# Patient Record
Sex: FEMALE | Race: WHITE | NOT HISPANIC OR LATINO | Employment: UNEMPLOYED | URBAN - METROPOLITAN AREA
[De-identification: names, ages, dates, MRNs, and addresses within clinical notes are randomized per-mention and may not be internally consistent; named-entity substitution may affect disease eponyms.]

---

## 2021-06-04 ENCOUNTER — OFFICE VISIT (OUTPATIENT)
Dept: FAMILY MEDICINE | Facility: CLINIC | Age: 61
End: 2021-06-04
Payer: COMMERCIAL

## 2021-06-04 ENCOUNTER — TELEPHONE (OUTPATIENT)
Dept: RADIOLOGY | Facility: HOSPITAL | Age: 61
End: 2021-06-04

## 2021-06-04 ENCOUNTER — HOSPITAL ENCOUNTER (OUTPATIENT)
Dept: RADIOLOGY | Facility: HOSPITAL | Age: 61
Discharge: HOME | End: 2021-06-04
Attending: NURSE PRACTITIONER
Payer: COMMERCIAL

## 2021-06-04 VITALS
WEIGHT: 170.88 LBS | DIASTOLIC BLOOD PRESSURE: 84 MMHG | HEART RATE: 67 BPM | BODY MASS INDEX: 29.17 KG/M2 | SYSTOLIC BLOOD PRESSURE: 139 MMHG | TEMPERATURE: 98 F | HEIGHT: 64 IN

## 2021-06-04 DIAGNOSIS — M25.512 CHRONIC LEFT SHOULDER PAIN: Primary | ICD-10-CM

## 2021-06-04 DIAGNOSIS — M25.612 DECREASED ROM OF LEFT SHOULDER: ICD-10-CM

## 2021-06-04 DIAGNOSIS — G89.29 CHRONIC LEFT SHOULDER PAIN: Primary | ICD-10-CM

## 2021-06-04 DIAGNOSIS — R22.32 ARM MASS, LEFT: ICD-10-CM

## 2021-06-04 DIAGNOSIS — M25.512 CHRONIC LEFT SHOULDER PAIN: ICD-10-CM

## 2021-06-04 DIAGNOSIS — G89.29 CHRONIC LEFT SHOULDER PAIN: ICD-10-CM

## 2021-06-04 PROCEDURE — 99999 PR PBB SHADOW E&M-NEW PATIENT-LVL III: ICD-10-PCS | Mod: PBBFAC,,, | Performed by: NURSE PRACTITIONER

## 2021-06-04 PROCEDURE — 99203 OFFICE O/P NEW LOW 30 MIN: CPT | Mod: S$GLB,,, | Performed by: NURSE PRACTITIONER

## 2021-06-04 PROCEDURE — 3008F PR BODY MASS INDEX (BMI) DOCUMENTED: ICD-10-PCS | Mod: CPTII,S$GLB,, | Performed by: NURSE PRACTITIONER

## 2021-06-04 PROCEDURE — 73030 X-RAY EXAM OF SHOULDER: CPT | Mod: TC,PO,LT

## 2021-06-04 PROCEDURE — 99203 PR OFFICE/OUTPT VISIT, NEW, LEVL III, 30-44 MIN: ICD-10-PCS | Mod: S$GLB,,, | Performed by: NURSE PRACTITIONER

## 2021-06-04 PROCEDURE — 73030 XR SHOULDER COMPLETE 2 OR MORE VIEWS LEFT: ICD-10-PCS | Mod: 26,LT,, | Performed by: RADIOLOGY

## 2021-06-04 PROCEDURE — 1125F PR PAIN SEVERITY QUANTIFIED, PAIN PRESENT: ICD-10-PCS | Mod: S$GLB,,, | Performed by: NURSE PRACTITIONER

## 2021-06-04 PROCEDURE — 99999 PR PBB SHADOW E&M-NEW PATIENT-LVL III: CPT | Mod: PBBFAC,,, | Performed by: NURSE PRACTITIONER

## 2021-06-04 PROCEDURE — 73030 X-RAY EXAM OF SHOULDER: CPT | Mod: 26,LT,, | Performed by: RADIOLOGY

## 2021-06-04 PROCEDURE — 1125F AMNT PAIN NOTED PAIN PRSNT: CPT | Mod: S$GLB,,, | Performed by: NURSE PRACTITIONER

## 2021-06-04 PROCEDURE — 3008F BODY MASS INDEX DOCD: CPT | Mod: CPTII,S$GLB,, | Performed by: NURSE PRACTITIONER

## 2021-06-07 ENCOUNTER — HOSPITAL ENCOUNTER (OUTPATIENT)
Dept: RADIOLOGY | Facility: HOSPITAL | Age: 61
Discharge: HOME | End: 2021-06-07
Attending: NURSE PRACTITIONER
Payer: COMMERCIAL

## 2021-06-07 DIAGNOSIS — R22.32 ARM MASS, LEFT: ICD-10-CM

## 2021-06-07 PROCEDURE — 76882 US LMTD JT/FCL EVL NVASC XTR: CPT | Mod: TC,PO,LT

## 2021-06-07 PROCEDURE — 76882 US LMTD JT/FCL EVL NVASC XTR: CPT | Mod: 26,LT,, | Performed by: RADIOLOGY

## 2021-06-07 PROCEDURE — 76882 US SOFT TISSUE, UPPER EXTREMITY, LEFT: ICD-10-PCS | Mod: 26,LT,, | Performed by: RADIOLOGY

## 2023-09-05 ENCOUNTER — OFFICE VISIT (OUTPATIENT)
Dept: FAMILY MEDICINE | Facility: CLINIC | Age: 63
End: 2023-09-05
Payer: COMMERCIAL

## 2023-09-05 ENCOUNTER — LAB VISIT (OUTPATIENT)
Dept: LAB | Facility: HOSPITAL | Age: 63
End: 2023-09-05
Attending: PHYSICIAN ASSISTANT
Payer: COMMERCIAL

## 2023-09-05 VITALS
RESPIRATION RATE: 12 BRPM | DIASTOLIC BLOOD PRESSURE: 89 MMHG | SYSTOLIC BLOOD PRESSURE: 136 MMHG | BODY MASS INDEX: 28.14 KG/M2 | TEMPERATURE: 98 F | HEIGHT: 64 IN | HEART RATE: 75 BPM | OXYGEN SATURATION: 97 % | WEIGHT: 164.81 LBS

## 2023-09-05 DIAGNOSIS — L81.9 HYPERPIGMENTATION OF SKIN: ICD-10-CM

## 2023-09-05 DIAGNOSIS — L81.9 HYPERPIGMENTATION OF SKIN: Primary | ICD-10-CM

## 2023-09-05 LAB
ALBUMIN SERPL BCP-MCNC: 4.1 G/DL (ref 3.5–5.2)
ALP SERPL-CCNC: 65 U/L (ref 55–135)
ALT SERPL W/O P-5'-P-CCNC: 15 U/L (ref 10–44)
ANION GAP SERPL CALC-SCNC: 11 MMOL/L (ref 8–16)
AST SERPL-CCNC: 20 U/L (ref 10–40)
BASOPHILS # BLD AUTO: 0.03 K/UL (ref 0–0.2)
BASOPHILS NFR BLD: 0.6 % (ref 0–1.9)
BILIRUB SERPL-MCNC: 0.4 MG/DL (ref 0.1–1)
BUN SERPL-MCNC: 9 MG/DL (ref 8–23)
CALCIUM SERPL-MCNC: 9.5 MG/DL (ref 8.7–10.5)
CHLORIDE SERPL-SCNC: 105 MMOL/L (ref 95–110)
CO2 SERPL-SCNC: 25 MMOL/L (ref 23–29)
CREAT SERPL-MCNC: 0.7 MG/DL (ref 0.5–1.4)
DIFFERENTIAL METHOD: ABNORMAL
EOSINOPHIL # BLD AUTO: 0.2 K/UL (ref 0–0.5)
EOSINOPHIL NFR BLD: 4.1 % (ref 0–8)
ERYTHROCYTE [DISTWIDTH] IN BLOOD BY AUTOMATED COUNT: 12.5 % (ref 11.5–14.5)
EST. GFR  (NO RACE VARIABLE): >60 ML/MIN/1.73 M^2
GLUCOSE SERPL-MCNC: 91 MG/DL (ref 70–110)
HCT VFR BLD AUTO: 43.6 % (ref 37–48.5)
HGB BLD-MCNC: 13.5 G/DL (ref 12–16)
IMM GRANULOCYTES # BLD AUTO: 0.01 K/UL (ref 0–0.04)
IMM GRANULOCYTES NFR BLD AUTO: 0.2 % (ref 0–0.5)
LYMPHOCYTES # BLD AUTO: 1.3 K/UL (ref 1–4.8)
LYMPHOCYTES NFR BLD: 25.6 % (ref 18–48)
MCH RBC QN AUTO: 28.5 PG (ref 27–31)
MCHC RBC AUTO-ENTMCNC: 31 G/DL (ref 32–36)
MCV RBC AUTO: 92 FL (ref 82–98)
MONOCYTES # BLD AUTO: 0.3 K/UL (ref 0.3–1)
MONOCYTES NFR BLD: 6.5 % (ref 4–15)
NEUTROPHILS # BLD AUTO: 3.2 K/UL (ref 1.8–7.7)
NEUTROPHILS NFR BLD: 63 % (ref 38–73)
NRBC BLD-RTO: 0 /100 WBC
PLATELET # BLD AUTO: 178 K/UL (ref 150–450)
PMV BLD AUTO: 13.7 FL (ref 9.2–12.9)
POTASSIUM SERPL-SCNC: 4.2 MMOL/L (ref 3.5–5.1)
PROT SERPL-MCNC: 7.1 G/DL (ref 6–8.4)
RBC # BLD AUTO: 4.73 M/UL (ref 4–5.4)
SODIUM SERPL-SCNC: 141 MMOL/L (ref 136–145)
TSH SERPL DL<=0.005 MIU/L-ACNC: 0.84 UIU/ML (ref 0.4–4)
WBC # BLD AUTO: 5.08 K/UL (ref 3.9–12.7)

## 2023-09-05 PROCEDURE — 85025 COMPLETE CBC W/AUTO DIFF WBC: CPT | Performed by: PHYSICIAN ASSISTANT

## 2023-09-05 PROCEDURE — 3008F PR BODY MASS INDEX (BMI) DOCUMENTED: ICD-10-PCS | Mod: CPTII,S$GLB,, | Performed by: PHYSICIAN ASSISTANT

## 2023-09-05 PROCEDURE — 1159F PR MEDICATION LIST DOCUMENTED IN MEDICAL RECORD: ICD-10-PCS | Mod: CPTII,S$GLB,, | Performed by: PHYSICIAN ASSISTANT

## 2023-09-05 PROCEDURE — 3075F SYST BP GE 130 - 139MM HG: CPT | Mod: CPTII,S$GLB,, | Performed by: PHYSICIAN ASSISTANT

## 2023-09-05 PROCEDURE — 99999 PR PBB SHADOW E&M-EST. PATIENT-LVL IV: CPT | Mod: PBBFAC,,, | Performed by: PHYSICIAN ASSISTANT

## 2023-09-05 PROCEDURE — 1160F RVW MEDS BY RX/DR IN RCRD: CPT | Mod: CPTII,S$GLB,, | Performed by: PHYSICIAN ASSISTANT

## 2023-09-05 PROCEDURE — 3079F PR MOST RECENT DIASTOLIC BLOOD PRESSURE 80-89 MM HG: ICD-10-PCS | Mod: CPTII,S$GLB,, | Performed by: PHYSICIAN ASSISTANT

## 2023-09-05 PROCEDURE — 84443 ASSAY THYROID STIM HORMONE: CPT | Performed by: PHYSICIAN ASSISTANT

## 2023-09-05 PROCEDURE — 1160F PR REVIEW ALL MEDS BY PRESCRIBER/CLIN PHARMACIST DOCUMENTED: ICD-10-PCS | Mod: CPTII,S$GLB,, | Performed by: PHYSICIAN ASSISTANT

## 2023-09-05 PROCEDURE — 99999 PR PBB SHADOW E&M-EST. PATIENT-LVL IV: ICD-10-PCS | Mod: PBBFAC,,, | Performed by: PHYSICIAN ASSISTANT

## 2023-09-05 PROCEDURE — 1159F MED LIST DOCD IN RCRD: CPT | Mod: CPTII,S$GLB,, | Performed by: PHYSICIAN ASSISTANT

## 2023-09-05 PROCEDURE — 99213 OFFICE O/P EST LOW 20 MIN: CPT | Mod: S$GLB,,, | Performed by: PHYSICIAN ASSISTANT

## 2023-09-05 PROCEDURE — 80053 COMPREHEN METABOLIC PANEL: CPT | Performed by: PHYSICIAN ASSISTANT

## 2023-09-05 PROCEDURE — 36415 COLL VENOUS BLD VENIPUNCTURE: CPT | Mod: PO | Performed by: PHYSICIAN ASSISTANT

## 2023-09-05 PROCEDURE — 3008F BODY MASS INDEX DOCD: CPT | Mod: CPTII,S$GLB,, | Performed by: PHYSICIAN ASSISTANT

## 2023-09-05 PROCEDURE — 3075F PR MOST RECENT SYSTOLIC BLOOD PRESS GE 130-139MM HG: ICD-10-PCS | Mod: CPTII,S$GLB,, | Performed by: PHYSICIAN ASSISTANT

## 2023-09-05 PROCEDURE — 99213 PR OFFICE/OUTPT VISIT, EST, LEVL III, 20-29 MIN: ICD-10-PCS | Mod: S$GLB,,, | Performed by: PHYSICIAN ASSISTANT

## 2023-09-05 PROCEDURE — 3079F DIAST BP 80-89 MM HG: CPT | Mod: CPTII,S$GLB,, | Performed by: PHYSICIAN ASSISTANT

## 2023-09-05 NOTE — PROGRESS NOTES
Assessment/Plan:    Problem List Items Addressed This Visit    None  Visit Diagnoses       Hyperpigmentation of skin    -  Primary    Relevant Orders    CBC Auto Differential    Comprehensive Metabolic Panel    TSH        -small areas of hyperpigmentation on R ankle/feet  -checking labs today   -consider referral to dermatology   -ER precautions for severe or worsening of symptoms    Follow up if symptoms worsen or fail to improve.    Brooke Stauffer PA-C  _____________________________________________________________________________________________________________________________________________________    CC: foot/ankle discoloration    HPI: Patient is in clinic today as an established patient here for foot/ankle discoloration. Patient reports noticing a small brown spot on her R ankle a few months ago. She stated that the spot has not changed since that time. She also reports noticing a few other small brown spots on her feet over the past few weeks. She reports noticing this while she was out in the sun on vacation. She denies lower extremity pain or swelling. She denies history of any significant medical problems. No other complaints today.    History reviewed. No pertinent past medical history.  Past Surgical History:   Procedure Laterality Date    BILATERAL TUBAL LIGATION       SECTION      X 2     Review of patient's allergies indicates:  No Known Allergies  Social History     Tobacco Use    Smoking status: Never    Smokeless tobacco: Never   Substance Use Topics    Alcohol use: Yes    Drug use: Never     Family History   Problem Relation Age of Onset    Hypertension Mother     Dementia Mother     Hyperlipidemia Mother     Breast cancer Sister 44        SISTER GENETIC TEST NEG    Heart disease Maternal Grandmother     Heart disease Maternal Grandfather      No current outpatient medications on file prior to visit.     No current facility-administered medications on file prior to visit.       Review  "of Systems   Constitutional:  Negative for chills, diaphoresis, fatigue and fever.   HENT:  Negative for congestion, ear pain, postnasal drip, sinus pain and sore throat.    Eyes:  Negative for pain and redness.   Respiratory:  Negative for cough, chest tightness and shortness of breath.    Cardiovascular:  Negative for chest pain and leg swelling.   Gastrointestinal:  Negative for abdominal pain, constipation, diarrhea, nausea and vomiting.   Genitourinary:  Negative for dysuria and hematuria.   Musculoskeletal:  Negative for arthralgias and joint swelling.   Skin:  Positive for color change.   Neurological:  Negative for dizziness, syncope and headaches.   Psychiatric/Behavioral:  Negative for dysphoric mood. The patient is not nervous/anxious.        Vitals:    09/05/23 0902   BP: 136/89   BP Location: Right arm   Patient Position: Sitting   BP Method: Large (Automatic)   Pulse: 75   Resp: 12   Temp: 97.7 °F (36.5 °C)   SpO2: 97%   Weight: 74.8 kg (164 lb 12.8 oz)   Height: 5' 4" (1.626 m)       Wt Readings from Last 3 Encounters:   09/05/23 74.8 kg (164 lb 12.8 oz)   07/07/22 77.2 kg (170 lb 4.8 oz)   06/04/21 77.5 kg (170 lb 13.7 oz)       Physical Exam  Constitutional:       General: She is not in acute distress.     Appearance: Normal appearance. She is well-developed.   HENT:      Head: Normocephalic and atraumatic.   Eyes:      Conjunctiva/sclera: Conjunctivae normal.   Cardiovascular:      Rate and Rhythm: Normal rate and regular rhythm.      Pulses: Normal pulses.      Heart sounds: Normal heart sounds. No murmur heard.  Pulmonary:      Effort: Pulmonary effort is normal. No respiratory distress.      Breath sounds: Normal breath sounds.   Abdominal:      General: Bowel sounds are normal. There is no distension.      Palpations: Abdomen is soft.      Tenderness: There is no abdominal tenderness.   Musculoskeletal:         General: Normal range of motion.      Cervical back: Normal range of motion and neck " supple.   Skin:     General: Skin is warm and dry.      Findings: No rash.      Comments: +small areas of hyperpigmentation on R ankle and feet bilaterally   Neurological:      General: No focal deficit present.      Mental Status: She is alert and oriented to person, place, and time.   Psychiatric:         Mood and Affect: Mood normal.         Behavior: Behavior normal.                   Health Maintenance   Topic Date Due    Hepatitis C Screening  Never done    Lipid Panel  Never done    TETANUS VACCINE  Never done    Shingles Vaccine (1 of 2) Never done    Mammogram  07/07/2023    Colorectal Cancer Screening  07/08/2030

## 2023-09-06 ENCOUNTER — TELEPHONE (OUTPATIENT)
Dept: FAMILY MEDICINE | Facility: CLINIC | Age: 63
End: 2023-09-06
Payer: COMMERCIAL

## 2023-09-06 DIAGNOSIS — L81.9 HYPERPIGMENTATION OF SKIN: Primary | ICD-10-CM

## 2023-09-07 NOTE — TELEPHONE ENCOUNTER
Please let patient know that her labs are normal. I have placed a referral to dermatology to further assess skin discoloration.     I have signed for the following orders AND/OR meds.  Please call the patient and ask the patient to schedule the testing AND/OR inform about any medications that were sent. Medications have been sent to pharmacy listed below      Orders Placed This Encounter   Procedures    Ambulatory referral/consult to Dermatology     Standing Status:   Future     Standing Expiration Date:   10/6/2024     Referral Priority:   Routine     Referral Type:   Consultation     Referral Reason:   Specialty Services Required     Requested Specialty:   Dermatology     Number of Visits Requested:   1              [facility] #[id number] - [place] - [address] AT SEC OF [facility] [address] [facility]  [address]  [place] [address]  Phone: [phone] Fax: [phone]    [id number] - [place] [place] - [address] [facility] AT SEC OF [place] [address] [address]  [address] [facility]  [place] [address]  Phone: [phone] Fax: [phone]

## 2023-09-07 NOTE — PROGRESS NOTES
Results have been released via [employer]. Please verify that these have been viewed by patient. If not, please call patient with results.     I have sent a message to them with the following interpretation (see below).    I have reviewed your recent blood work.     CBC NORMAL-The CBC appears to be stable at this time with no sign of major anemia, abnormal white count or platelet abnormality.  CMP/BMP NORMAL-The electrolytes all appear stable at this time. This includes kidney functions along with routine electrolytes like sugar, potassium and sodium. The liver enzymes were noted to be stable also.  TSH NORMAL-The TSH screening indicated a normal function of the thyroid.    Please do not hesitate to call or message with any additional questions or concerns.    Brooke Stauffer PA-C

## 2024-02-20 ENCOUNTER — OFFICE VISIT (OUTPATIENT)
Dept: SURGERY | Facility: CLINIC | Age: 64
End: 2024-02-20
Payer: COMMERCIAL

## 2024-02-20 VITALS — BODY MASS INDEX: 28.07 KG/M2 | HEIGHT: 64 IN | WEIGHT: 164.44 LBS

## 2024-02-20 DIAGNOSIS — Z12.39 ENCOUNTER FOR SCREENING BREAST EXAMINATION: ICD-10-CM

## 2024-02-20 DIAGNOSIS — R92.8 ABNORMAL MAMMOGRAM: ICD-10-CM

## 2024-02-20 DIAGNOSIS — Z71.89 COUNSELING AND COORDINATION OF CARE: Primary | ICD-10-CM

## 2024-02-20 DIAGNOSIS — Z71.89 COUNSELING ON HEALTH PROMOTION AND DISEASE PREVENTION: ICD-10-CM

## 2024-02-20 DIAGNOSIS — N63.21 UNSPECIFIED LUMP IN THE LEFT BREAST, UPPER OUTER QUADRANT: Primary | ICD-10-CM

## 2024-02-20 PROCEDURE — 3008F BODY MASS INDEX DOCD: CPT | Mod: CPTII,S$GLB,, | Performed by: NURSE PRACTITIONER

## 2024-02-20 PROCEDURE — 1159F MED LIST DOCD IN RCRD: CPT | Mod: CPTII,S$GLB,, | Performed by: NURSE PRACTITIONER

## 2024-02-20 PROCEDURE — 99999 PR PBB SHADOW E&M-EST. PATIENT-LVL III: CPT | Mod: PBBFAC,,, | Performed by: NURSE PRACTITIONER

## 2024-02-20 PROCEDURE — 99203 OFFICE O/P NEW LOW 30 MIN: CPT | Mod: S$GLB,,, | Performed by: NURSE PRACTITIONER

## 2024-02-20 PROCEDURE — 1160F RVW MEDS BY RX/DR IN RCRD: CPT | Mod: CPTII,S$GLB,, | Performed by: NURSE PRACTITIONER

## 2024-02-20 NOTE — PROGRESS NOTES
Subjective:       Patient ID: Joyce Velarde is a 64 y.o. female.    Chief Complaint: Abnormal mammogram left breast    Patient is referred by Dr. Jazmine Daniel for the evaluation of a left breast abnormal mammogram    2024- pt had a bilateral screening mammogram at [facility] that revealed a focal asymmetry in the upper outer middle region  2024- left diagnostic and ultrasound reveals left breast solid mass with spiculated margins in the upper outer quadrant left breast  1 oclock. Abnormal axillary node noted left axilla with cortical thickness. Ultrasound guided biopsy of the breast and axillary abnormality recommended.    Pt denies notice of any palpable abnormality    Menarche- 15  G 3 P 3  First preg at 25  LMP- 51 y/o- natural  HRT- none  Breast feeding- none  Dense breasts- yes  ETOH- occasional  Genetic mutation- sister negative genetic testing  radiation to neck or chest wall- none  previous breast biopsy or breast surgery- atypical ductal hyperplasia or lobular hyperplasia- none    FH:mother breast cancer at 85 inflammatory breast cancer, sister breast cancer at late 40's- HER 2 positive- had genetic testing and negative for mutation    No past medical history on file.  Past Surgical History:   Procedure Laterality Date    BILATERAL TUBAL LIGATION       SECTION      X 2     Family History   Problem Relation Age of Onset    Hypertension Mother     Dementia Mother     Hyperlipidemia Mother     Breast cancer Sister 44        SISTER GENETIC TEST NEG    Heart disease Maternal Grandmother     Heart disease Maternal Grandfather      Social History     Socioeconomic History    Marital status:    Tobacco Use    Smoking status: Never    Smokeless tobacco: Never   Substance and Sexual Activity    Alcohol use: Yes    Drug use: Never    Sexual activity: Yes       No current outpatient medications on file.     No current facility-administered medications for this visit.     Review of patient's  allergies indicates:  No Known Allergies    Review of Systems   Constitutional: Negative.    HENT: Negative.     Eyes: Negative.    Respiratory: Negative.     Cardiovascular: Negative.    Gastrointestinal: Negative.    Endocrine: Negative.    Genitourinary: Negative.    Musculoskeletal: Negative.    Skin: Negative.    Allergic/Immunologic: Negative.    Neurological: Negative.    Hematological: Negative.  Negative for adenopathy.   Psychiatric/Behavioral: Negative.       Breast- denies any breast pain or nipple discharge    Objective:      Chaperone present for exam Debby Amin MA    Physical Exam  Constitutional:       Appearance: She is well-developed.   HENT:      Head: Normocephalic and atraumatic.      Right Ear: External ear normal.      Left Ear: External ear normal.      Mouth/Throat:      Pharynx: No oropharyngeal exudate.   Eyes:      General: No scleral icterus.        Right eye: No discharge.         Left eye: No discharge.      Conjunctiva/sclera: Conjunctivae normal.      Pupils: Pupils are equal, round, and reactive to light.   Neck:      Thyroid: No thyromegaly.   Chest:   Breasts:     Breasts are asymmetrical (left upper outer quadrant thickening that is not appreciated in right).      Right: No inverted nipple, mass, nipple discharge, skin change or tenderness.      Left: No inverted nipple, mass, nipple discharge, skin change or tenderness.          Comments: Unable to appreciate a palpable left axillary node  Musculoskeletal:         General: Normal range of motion.      Cervical back: Normal range of motion and neck supple.   Lymphadenopathy:      Head:      Right side of head: No submental, submandibular, tonsillar, preauricular, posterior auricular or occipital adenopathy.      Left side of head: No submental, submandibular, tonsillar, preauricular, posterior auricular or occipital adenopathy.      Cervical: No cervical adenopathy.      Right cervical: No superficial or posterior cervical  adenopathy.     Left cervical: No superficial or posterior cervical adenopathy.      Upper Body:      Right upper body: No supraclavicular adenopathy.      Left upper body: No supraclavicular adenopathy.   Skin:     General: Skin is warm and dry.      Coloration: Skin is not pale.      Findings: No erythema or rash.   Neurological:      Mental Status: She is alert and oriented to person, place, and time.   Psychiatric:         Behavior: Behavior normal.         Thought Content: Thought content normal.         Judgment: Judgment normal.       Result:   Mammo Digital Screening Bilat w/ Trevor     History:  Patient is 64 y.o. and is seen for a screening mammogram.     Films Compared:  Prior images (if available) were compared.     Findings:  This procedure was performed using tomosynthesis. Computer-aided detection was utilized in the interpretation of this examination.  The breasts have scattered areas of fibroglandular density.      Left  There is a focal asymmetry seen in the upper outer quadrant of the left breast in the middle depth.      Right  There is no evidence of suspicious masses, calcifications, or other abnormal findings in the right breast.     Impression:  Left  Focal Asymmetry: Left breast focal asymmetry at the upper outer middle position. Assessment: 0 - Incomplete. Ultrasound is recommended.      Right  There is no mammographic evidence of malignancy in the right breast.     BI-RADS Category:   Overall: 0 - Incomplete: Needs Additional Imaging Evaluation        Recommendation:  Breast ultrasound is recommended.    2/20/2024  Result:   US Breast Left Limited     History:  Patient is 64 y.o. and is seen for diagnostic imaging.     Films Compared:  Compared to: 02/15/2024 Mammo Digital Screening Bilat w/ Trevor and 07/07/2022 Mammo Digital Screening Bilat w/ Trevor     Findings:     There is a 0.8 mm x 0.6 mm x 0.6 mm oval, hypoechoic mass with spiculated margins with shadowing seen in the upper outer  quadrant of the left breast at 1 o'clock in the middle depth, 5 cm from the nipple. The mass correlates with the prior mammogram finding.  Additionally, there is abnormal appearing lymph node in the left axilla measuring 0.7 x 0.7 x 0.6 cm demonstrating eccentric cortical thickening measuring 4 mm in thickness      Impression:  Left  Mass: Left breast 0.8 mm x 0.6 mm x 0.6 mm mass at the upper outer middle 1 o'clock position.  Suspicious left axillary lymph node.  Assessment: 4 - Suspicious finding.  Ultrasound-guided biopsy is recommended for both of these findings.  Results were discussed with the patient and the ordering physician's office was notified.     BI-RADS Category:   Overall: 4 - Suspicious        Recommendation:  Biopsy is recommended.       Assessment:       1. Counseling and coordination of care    2. Abnormal mammogram    3. Counseling on health promotion and disease prevention    4. Encounter for screening breast examination        Plan:       Abnormal mammogram left breast- ultrasound biopsy recommended of solid mass in left breast and left axilla mass  Clinical exam reveals- asymmetric thickening upper outer left breast- negative axillary exam clinically  Explained biopsy procedure and process.  Discussed risk of bleeding, bruising, discomfort and the need for further evaluation or surgery if biopsy returns positive for cancer.   If path returns positive for cancer discussed pt case would be presented to our multi disc breast conference for evaluation and coordination of care with hematology, breast surgeon and rad onc. Breast navigator will contact pt with results  Pre and postop instructions given to pt in writing and verbally  Pt scheduled for ultrasound biopsy tomorrow  - 8 am  Reviewed [facility] Board of Medical Examiners information for patients regarding breast biopsy and treatment of breast cancer.

## 2024-02-21 ENCOUNTER — HOSPITAL ENCOUNTER (OUTPATIENT)
Dept: RADIOLOGY | Facility: HOSPITAL | Age: 64
Discharge: HOME | End: 2024-02-21
Attending: SURGERY
Payer: COMMERCIAL

## 2024-02-21 DIAGNOSIS — N63.21 UNSPECIFIED LUMP IN THE LEFT BREAST, UPPER OUTER QUADRANT: ICD-10-CM

## 2024-02-21 DIAGNOSIS — N63.20 MASS OF LEFT BREAST, UNSPECIFIED QUADRANT: ICD-10-CM

## 2024-02-21 PROCEDURE — 88305 TISSUE EXAM BY PATHOLOGIST: CPT | Mod: 26,,, | Performed by: STUDENT IN AN ORGANIZED HEALTH CARE EDUCATION/TRAINING PROGRAM

## 2024-02-21 PROCEDURE — 38505 NEEDLE BIOPSY LYMPH NODES: CPT | Mod: 59,LT,, | Performed by: RADIOLOGY

## 2024-02-21 PROCEDURE — 88360 TUMOR IMMUNOHISTOCHEM/MANUAL: CPT | Mod: 26,,, | Performed by: STUDENT IN AN ORGANIZED HEALTH CARE EDUCATION/TRAINING PROGRAM

## 2024-02-21 PROCEDURE — 77065 DX MAMMO INCL CAD UNI: CPT | Mod: 26,LT,, | Performed by: RADIOLOGY

## 2024-02-21 PROCEDURE — 88305 TISSUE EXAM BY PATHOLOGIST: CPT | Performed by: STUDENT IN AN ORGANIZED HEALTH CARE EDUCATION/TRAINING PROGRAM

## 2024-02-21 PROCEDURE — 88341 IMHCHEM/IMCYTCHM EA ADD ANTB: CPT | Performed by: STUDENT IN AN ORGANIZED HEALTH CARE EDUCATION/TRAINING PROGRAM

## 2024-02-21 PROCEDURE — 88341 IMHCHEM/IMCYTCHM EA ADD ANTB: CPT | Mod: 26,59,, | Performed by: STUDENT IN AN ORGANIZED HEALTH CARE EDUCATION/TRAINING PROGRAM

## 2024-02-21 PROCEDURE — A4648 IMPLANTABLE TISSUE MARKER: HCPCS

## 2024-02-21 PROCEDURE — 19083 BX BREAST 1ST LESION US IMAG: CPT | Mod: LT,,, | Performed by: RADIOLOGY

## 2024-02-21 PROCEDURE — 77065 DX MAMMO INCL CAD UNI: CPT | Mod: TC,LT

## 2024-02-21 PROCEDURE — 88360 TUMOR IMMUNOHISTOCHEM/MANUAL: CPT | Performed by: STUDENT IN AN ORGANIZED HEALTH CARE EDUCATION/TRAINING PROGRAM

## 2024-02-21 PROCEDURE — 88342 IMHCHEM/IMCYTCHM 1ST ANTB: CPT | Performed by: STUDENT IN AN ORGANIZED HEALTH CARE EDUCATION/TRAINING PROGRAM

## 2024-02-21 PROCEDURE — 88342 IMHCHEM/IMCYTCHM 1ST ANTB: CPT | Mod: 26,59,, | Performed by: STUDENT IN AN ORGANIZED HEALTH CARE EDUCATION/TRAINING PROGRAM

## 2024-02-21 NOTE — NURSING
Pressure held on left breast  and left axilla biopsy site for 10 mins, hemostasis was achieved, steri strips were applied, and wound was covered with 4x4 guaze and a tegaderm.  Dressing clean, dry and intact with no drainage noted.  Discharge instructions given verbally and in writing, patient voiced understandings.  Patient discharged and accompanied by family member.

## 2024-02-23 ENCOUNTER — TELEPHONE (OUTPATIENT)
Dept: SURGERY | Facility: CLINIC | Age: 64
End: 2024-02-23
Payer: COMMERCIAL

## 2024-02-23 NOTE — TELEPHONE ENCOUNTER
Called patient to discuss breast biopsy results- we reviewed the pathology report and that the next step was to meet with a breast surgical oncologist to discuss removing this area and the treatment plan. Patient scheduled with Dr. Bhandari on 2/27/24 pending biomarker receptors. Patient educated on expectations for visit and encouraged to bring support person(s). All questions answered and pt denied any other needs at this time.

## 2024-02-25 PROBLEM — C50.412 MALIGNANT NEOPLASM OF UPPER-OUTER QUADRANT OF LEFT BREAST IN FEMALE, ESTROGEN RECEPTOR POSITIVE: Status: ACTIVE | Noted: 2024-02-25

## 2024-02-25 PROBLEM — Z17.0 MALIGNANT NEOPLASM OF UPPER-OUTER QUADRANT OF LEFT BREAST IN FEMALE, ESTROGEN RECEPTOR POSITIVE: Status: ACTIVE | Noted: 2024-02-25

## 2024-02-25 NOTE — PROGRESS NOTES
Breast Surgical Oncology  [place]    Date of Service: 2024    SUBJECTIVE:   Chief complaint: left breast cancer    HISTORY OF PRESENT ILLNESS:   Joyce Velarde is a 64 y.o. female who is kindly referred by Dr. Jazmine Daniel for left breast cancer.    A left breast abnormality was identified on routine screening mammography.  Focused sonographic evaluation revealed a 0.8 mm x 0.6 mm x 0.6 mm oval, hypoechoic mass with spiculated margins with shadowing seen in the upper outer quadrant of the left breast at 1 o'clock in the middle depth, 5 cm from the nipple. The mass correlates with the prior mammogram finding.  Additionally, there is abnormal appearing lymph node in the left axilla measuring 0.7 x 0.7 x 0.6 cm demonstrating eccentric cortical thickening measuring 4 mm in thickness. Core needle biopsy of the breast mass and lymph node confirmed hormone receptor positive, [id number] negative invasive ductal carcinoma. She denies breast concerns such as pain, masses, skin changes, nipple discharge, nipple retraction or lumps under the arm.  She denies prior breast surgery or biopsy.     Her breast cancer risk factor profile is as follows: Menarche at 15, Menopause at 50.  She is . Age at first live birth was 25. Family history of cancer is as follows: mother breast cancer at 85 inflammatory breast cancer, sister breast cancer at late 40's- HER 2 positive- had genetic testing and negative for mutation     FAMILY HISTORY:     Family History   Problem Relation Age of Onset    Hypertension Mother     Dementia Mother     Hyperlipidemia Mother     Breast cancer Sister 44        SISTER GENETIC TEST NEG    Heart disease Maternal Grandmother     Heart disease Maternal Grandfather         PAST MEDICAL HISTORY:   No past medical history on file.    SURGICAL HISTORY:     Past Surgical History:   Procedure Laterality Date    BILATERAL TUBAL LIGATION       SECTION      X 2       SOCIAL HISTORY:     Social  History     Tobacco Use    Smoking status: Never    Smokeless tobacco: Never   Substance Use Topics    Alcohol use: Yes    Drug use: Never        MEDICATIONS/ALLERGIES:   No current outpatient medications on file.  Review of patient's allergies indicates:  No Known Allergies    REVIEW OF SYSTEMS:   I have reviewed 12 systems, including 2 points per system. Pertinent reported positives are: none    PHYSICAL EXAM:   General: The patient appears well and is in no acute distress.     Corinna Davenpotr MA was present as a chaperone for the examination.   BREAST EXAM  No Asymmetry  Right:  - Mass: No  - Skin change: No  - Nipple Discharge: No  - Nipple retraction: No  - Axillary LAD: No  Left:   - Mass: No  - Skin change: No, post biopsy ecchymosis  - Nipple Discharge: No  - Nipple retraction: No  - Axillary LAD: No    IMAGING:   Images were personally reviewed.   Results for orders placed in visit on 02/20/24    US Breast Left Limited    Narrative  Result:  US Breast Left Limited    History:  Patient is 64 y.o. and is seen for diagnostic imaging.    Films Compared:  Compared to: 02/15/2024 Mammo Digital Screening Bilat w/ Trevor and 07/07/2022 Mammo Digital Screening Bilat w/ Trevor    Findings:    There is a 0.8 mm x 0.6 mm x 0.6 mm oval, hypoechoic mass with spiculated margins with shadowing seen in the upper outer quadrant of the left breast at 1 o'clock in the middle depth, 5 cm from the nipple. The mass correlates with the prior mammogram finding.  Additionally, there is abnormal appearing lymph node in the left axilla measuring 0.7 x 0.7 x 0.6 cm demonstrating eccentric cortical thickening measuring 4 mm in thickness    Impression  Left  Mass: Left breast 0.8 mm x 0.6 mm x 0.6 mm mass at the upper outer middle 1 o'clock position.  Suspicious left axillary lymph node.  Assessment: 4 - Suspicious finding.  Ultrasound-guided biopsy is recommended for both of these findings.  Results were discussed with the patient and the  ordering physician's office was notified.    BI-RADS Category:  Overall: 4 - Suspicious      Recommendation:  Biopsy is recommended.    PATHOLOGY:     Lab Results   Component Value Date    FPATHDX  02/21/2024     Part 1   Breast, left, 1 o'clock position, ultrasound-guided biopsy:  Invasive mammary carcinoma, moderately differentiated  [place] grade 2:  Tubule formation-3, nuclear pleomorphism-2 and mitotic count -1   Invasive carcinoma measures 7 mm in greatest dimension   No carcinoma in-situ or lymphovascular invasion is identified  All submitted cores are involved by invasive carcinoma      Part 2   Axilla, left, ultrasound-guided biopsy:   Positive for metastatic carcinoma  Metastatic focus measures 4.5 mm in greatest dimension  Extranodal extension not definitively identified    ** tumor biomarkers and additional stains will be performed with results provided in a supplemental report.    This case was reviewed by Dr. RODNEY Hankins who concurs with the above diagnoses.         ASSESSMENT:     1. Malignant neoplasm of upper-outer quadrant of left breast in female, estrogen receptor positive          PLAN:     Joyce Velarde is a 64 y.o. female who is new diagnosis of Stage IB (T1b N1 Mx) LEFT Breast Invasive Mammary Carcinoma, Grade 2, ER 90-95%, VT 20-30%, [id number] 0 with a ki67 of 40%. I have reviewed her imaging and pathology reports with her and I have provided her with copies. Today, we reviewed reviewed the guidelines of the National Comprehensive Cancer Network for her diagnosis.    I am referring her for genetic counseling based on diagnosis and family history.  Staging CT Chest/abdomen/pelvis and bone scan are warranted due to positive axillary lymph node.     We have discussed the surgical choices of lumpectomy with radiation and mastectomy with or without radiation.  I have explained that the survival is the same, regardless of the surgery chosen.  We have discussed that the local recurrence  rate following mastectomy is 2-5%.  I have explained that the local recurrence rate was slightly higher following breast conservation in the large trials that compared mastectomy and breast conservation. However, with current medical therapies, local recurrence has been reduced to as low as 6%. I did review with her the general schedule and side effects of radiation. She will be referred to radiation oncology. We briefly discussed reconstruction options, and the [facility] breast cancer treatment brochure was provided.    We reviewed the need for a targeted sentinel lymph node biopsy to further stage the axilla because her axillary disease is not clinically appreciable on examination. She will need GEETA localization of her positive lymph node.     Because her cancer is hormone receptor positive, she will be recommended for endocrine therapy.  The decision for or against adjuvant chemotherapy will be made following surgery. She understands that she will be referred to a medical oncologist to discuss these points further.    I have provided her with general information regarding the supportive services available here including oncology social work, patient navigation, nutritional services, preoperative and postoperative physical therapy programs, and genetic counseling.      I spent a total of 60 minutes on this visit. This includes face to face time and non-face to face time preparing to see the patient (eg, review of tests), obtaining and/or reviewing separately obtained history, documenting clinical information in the electronic or other health record, independently interpreting results and communicating results to the patient/family/caregiver, or care coordinator.      Lynda Bhandari M.D.

## 2024-02-26 ENCOUNTER — TELEPHONE (OUTPATIENT)
Dept: SURGERY | Facility: CLINIC | Age: 64
End: 2024-02-26
Payer: COMMERCIAL

## 2024-02-26 LAB
FINAL PATHOLOGIC DIAGNOSIS: NORMAL
GROSS: NORMAL
Lab: NORMAL
SUPPLEMENTAL DIAGNOSIS: NORMAL

## 2024-02-27 ENCOUNTER — OFFICE VISIT (OUTPATIENT)
Dept: GENETICS | Facility: CLINIC | Age: 64
End: 2024-02-27
Payer: COMMERCIAL

## 2024-02-27 ENCOUNTER — PATIENT MESSAGE (OUTPATIENT)
Dept: SURGERY | Facility: CLINIC | Age: 64
End: 2024-02-27
Payer: COMMERCIAL

## 2024-02-27 ENCOUNTER — OFFICE VISIT (OUTPATIENT)
Dept: SURGERY | Facility: CLINIC | Age: 64
End: 2024-02-27
Payer: COMMERCIAL

## 2024-02-27 ENCOUNTER — LAB VISIT (OUTPATIENT)
Dept: LAB | Facility: HOSPITAL | Age: 64
End: 2024-02-27
Attending: SURGERY
Payer: COMMERCIAL

## 2024-02-27 DIAGNOSIS — C50.412 MALIGNANT NEOPLASM OF UPPER-OUTER QUADRANT OF LEFT BREAST IN FEMALE, ESTROGEN RECEPTOR POSITIVE: Primary | ICD-10-CM

## 2024-02-27 DIAGNOSIS — Z17.0 MALIGNANT NEOPLASM OF UPPER-OUTER QUADRANT OF LEFT BREAST IN FEMALE, ESTROGEN RECEPTOR POSITIVE: ICD-10-CM

## 2024-02-27 DIAGNOSIS — C77.3 MALIGNANT NEOPLASM METASTATIC TO LYMPH NODE OF AXILLA: ICD-10-CM

## 2024-02-27 DIAGNOSIS — Z80.3 FAMILY HISTORY OF BREAST CANCER: Primary | ICD-10-CM

## 2024-02-27 DIAGNOSIS — Z80.3 FAMILY HISTORY OF BREAST CANCER: ICD-10-CM

## 2024-02-27 DIAGNOSIS — Z17.0 MALIGNANT NEOPLASM OF UPPER-OUTER QUADRANT OF LEFT BREAST IN FEMALE, ESTROGEN RECEPTOR POSITIVE: Primary | ICD-10-CM

## 2024-02-27 DIAGNOSIS — C50.412 MALIGNANT NEOPLASM OF UPPER-OUTER QUADRANT OF LEFT BREAST IN FEMALE, ESTROGEN RECEPTOR POSITIVE: ICD-10-CM

## 2024-02-27 LAB
CREAT SERPL-MCNC: 0.7 MG/DL (ref 0.5–1.4)
EST. GFR  (NO RACE VARIABLE): >60 ML/MIN/1.73 M^2

## 2024-02-27 PROCEDURE — 99205 OFFICE O/P NEW HI 60 MIN: CPT | Mod: S$GLB,,, | Performed by: SURGERY

## 2024-02-27 PROCEDURE — 82565 ASSAY OF CREATININE: CPT | Performed by: SURGERY

## 2024-02-27 PROCEDURE — 96040 PR GENETIC COUNSELING, EACH 30 MIN: CPT | Mod: S$GLB,,,

## 2024-02-27 PROCEDURE — 99999 PR PBB SHADOW E&M-EST. PATIENT-LVL II: CPT | Mod: PBBFAC,,,

## 2024-02-27 PROCEDURE — 36415 COLL VENOUS BLD VENIPUNCTURE: CPT | Performed by: SURGERY

## 2024-02-27 NOTE — TELEPHONE ENCOUNTER
Called pt to discuss that biomarkers have resulted and that she should plan to meet with Dr. Bhandari at 8am at [facility], pt verbalized understanding and confirmed all appt details.

## 2024-02-27 NOTE — PROGRESS NOTES
"  Cancer Genetics  Hereditary/High Risk Clinic  Department of Hematology and Oncology  [facility]        Date of Service:  2024  Provider:  Midni Davenport MS, [facility]    Patient Information  Name:  Joyce Velarde  :  1960  MRN:  [id number]        Referring Provider: Lynda Bhandari MD     The chief complaint leading to consultation is: as below.  Visit type: in-person.  Face-to-face time with patient: approximately 63 minutes.  Approximately  minutes in total were spent on this encounter, which includes face-to-face time and non-face-to-face time preparing to see the patient (e.g., review of tests), obtaining and/or reviewing separately obtained history, documenting clinical information in the electronic or other health record, independently interpreting results (not   reported) and communicating results to the patient/family/caregiver, or care coordination (not separately reported).    She is present with her .  SUBJECTIVE:      Chief Complaint: Genetic Counseling    History of Present Illness (HPI):  Joyce Velarde ("Joyce"), 64 y.o., assigned female sex at birth is new to the [facility] Department of Hematology and Oncology and to me.  She was referred by  Breast Surgery  for genetic cancer risk assessment given her personal history of breast cancer. At age 64, she was diagnosed with invasive ductal carcinoma of the left breast (ER+, WV+, HER2-).    Focused Medical History:   Germline cancer-genetic testing:  No  Cancer:  Yes  Left breast cancer (2024)  Colonoscopy: Yes  Most recent colonoscopy: , told to repeat in 5 years  Colon polyp:  Yes - 2 polyps  Mammogram: Yes  Most recent mammo: 02/15/2024  Breast MRI:  No  Other benign tumor:  Yes  Ovarian cysts  Pancreatitis:  No  Pancreatic cyst:  No  Reproductive organs intact      Focused Surgical History  N/A    Ancestry:  Ashkenazi Pentec[employer]al ancestry:  No  Paternal: Daniela, [place]  Maternal: [place]    Focused Family " History:  Consanguinity in ancestors:  No  Germline cancer-genetic testing in blood relatives:  Yes  Sister - genetic testing negative (~2015)  Double first cousin - genetic testing negative (~2016)  Cancer in family:  Yes; there are no known blood relatives affected with cancer other than those mentioned in the pedigree below    Family Cancer Pedigree:         Review of Systems:  See HPI.      SUBJECTIVE:   Records Reviewed  Medical History  Problem List  Any pertinent, available Procedures and Pathology reports in both [facility] and [facility]    COUNSELING   Causes of cancer  Germline cancer genetic testing is the testing of genes associated with cancer, known as cancer susceptibility genes.  Just as these genes are inherited from parents, mutations in these genes can be inherited, as well.  A mutation in a cancer susceptibility gene adversely affects the gene's ability to prevent cancer; therefore, carriers of cancer susceptibility gene mutations may be at increased risk for certain cancers.     Only 5-10% cancers are caused by a cancer susceptibility gene mutation, meaning the cancer is genetic/hereditary; rather, most cancers are sporadic.  Causes of sporadic cancers may include environmental risk factors, lifestyle risk factors, and non-modifiable risk factors.  It is important to note that members of a family often share not only their genetics but also risk factors including environmental and lifestyle risk factors, so cancers can be familial.    Mutations in BRCA1 and BRCA2 are associated with an increased risk for breast and ovarian cancer, in addition to male breast cancer, pancreatic, melanoma, and prostate cancer. Mutations in other genes may increase the risk of breast and prostate cancer, in addition to other cancers.     Potential results of genetic testing, and their implications  Potential results of genetic testing include positive, negative, and variant of unknown significance  (VUS).    A positive result indicates the presence of at least one clinically significant mutation, and the patient's associated cancer risks vary depending upon the cancer susceptibility gene(s) in which there is/are a mutation(s).  With a positive result, in some cases, depending upon the specific result and the patient's clinical history, modified risk management may be recommended, including measures for risk reduction and/or surveillance; however, modified management is not always an option.    A negative result indicates that no clinically significant mutations were identified in the gene(s) tested.    A VUS indicates that there is not presently enough data for the laboratory to make a determination as to whether the variant is clinically significant.  VUSs are not typically acted upon clinically.       Modified management may also be recommended, even with a result of no or unknown significance, based upon risk assessment that incorporates the family history.  Sometimes, depending upon the genetic testing result and the cancer diagnosis, additional/modified treatments may be an option, though this is not guaranteed.     Genetic mutation inheritance  If  Joyce tests positive for a mutation, her first-degree relatives would each have a 50% chance of having the same mutation, and other, more distantly related blood-relatives would also be at risk of having the same mutation.       Genetic discrimination  The Genetic Information Nondiscrimination Act (CARYL) is U[place] federal legislation that provides some protections against use of an individual's genetic information by their health insurer and by their employer.  Title I of CARYL prohibits most health insurers from utilizing an individual's genetic information to make decisions regarding insurance eligibility or premium charges.  Title II of CARYL prohibits covered entities, including employers, from requesting the genetic information of employees and applicants.   CARYL does not protect individuals from genetic discrimination toward health insurance obtained through a job with the  or through the Federal Employees Health Benefits Plan; from genetic discrimination by employers with fewer than 15 employees or if employed by the ; or from genetic discrimination by any other type of policies/entities, including but not limited to life insurance, disability insurance, long-term care insurance,  benefits, and  Health Services benefits.     Genetic testing logistics  An outside laboratory would perform the testing after a blood sample is collected at [facility] or a saliva sample is collected by the patient at home.  With genetic testing, there is a potential for the patient to incur out-of-pocket costs.  Results can take 2-3 weeks.  Post-test genetic counseling can be conducted once the genetic testing results are available.     Assessment/Plan  Based on the information provided by  Joyce, she meets current criteria for genetic testing of hereditary breast and ovarian cancer syndrome according to current clinical guidelines. Although some of her affected relative's have undergone genetic testing, Joyce is unable to retrieve a copy of the results at this time. Joyce's clinical history, including personal history and/or family history, is strongly suggestive of a hereditary cancer syndrome in the family given the personal and family history of breast cancer, including her mother, sister, and maternal first cousin.     Offered germline cancer-genetic testing at this time versus deferring testing at this time or declining testing altogether.  Various test panel options were discussed. Joyce decided to proceed with genetic testing through the [employer] BRCA1 and BRCA2 gene sequence and deletion/duplication and 21-gene BRCANext Expanded Cancer Panel.  Joyce has provided her informed consent to proceed. A blood draw was collected today.      Questions were encouraged and answered to the patient's satisfaction, and she verbalized understanding of information and agreement with the plan.         Signed,    Mindi Davenport MS, [facility] [place]  Certified Genetic Counselor, Hereditary and High-Risk Clinic  Hematology/Oncology, [facility]    02/27/2024

## 2024-02-27 NOTE — NURSING
Nurse Navigator Note:     Met with patient and spouse during her consult with Dr. Bhandari.  Patient and I reviewed the information she discussed with Dr. Bhandari, including treatment options, referrals, diagnosis, and future plans for workup. Patient and I went through the new patient booklet, explained some of the information and why it is provided.   Specifically discussed shared services listed in booklet and how to request referral. Discussed the role of the nurse navigator and how I can help her through her breast cancer journey.     Patient was given a copy of her appointments, Dr. Bhandari's card, and my card. Encouraged her to call me if she has any questions or concerns or would like to schedule any additional appointments. Verbalized understanding of all information.      Pt scheduled with genetic counselor today 2/27 to have genetic testing, extensive family history of breast cancer and would like results back before deciding on surgery. CT CAP and NM bone scan ordered per MD to have done before seeing medical oncology. M/O referral placed, prefers female providers, scheduled with Sandi Tran- [facility] and [facility] ruvalcaba. Physical therapy referral placed for [place] location. Pt aware that genetic testing takes 2-3 weeks to return, at that point she would like to finalize her surgical decision. She is aware that she will need at least one surgical marker placed in radiology before surgery, two if she chooses breast conservation.     Oncology Navigation   Intake  Date of Diagnosis: 02/21/24  Cancer Type: Breast  Type of Referral: Internal  Date of Referral: 02/23/24  Initial Nurse Navigator Contact: 02/23/24  Referral to Initial Contact Timeline (days): 0  Date Worked: 02/27/24  First Appointment Available: 02/27/24  Appointment Date: 02/27/24  First Available Date vs. Scheduled Date (days): 0  Multiple appointments: No     Treatment  Current Status: Staging work-up  Date Presented to Tumor Board:  "24    Surgery: Planned  Surgical Oncologist: Lynda Bhandari MD  Consult Date: 24    Medical Oncologist: Sandi Tran MD  Consult Date: 24       Procedures: Bone scan; CT; Genetic test  CT Schedule Date: 24  Genetic Testing Date Sent: 24    General Referrals: Physical Therapy  Physical Therapy: Samina Vieira  Physical Therapy Referral Date: 24    ER: Positive  TN: Positive  Her2: Negative       Support Systems: Spouse/significant other; Children; Family members  Barriers of Care: Barriers to Care "Assessment completed-no barriers noted"     Acuity  Stage: 1  Surgical Procedure Complexity: 2  Treatment Tolerability: Has not started treatment yet/treatment fully completed and side effects resolved  ECO  Comorbidities in Medical History: 1  Hospitalization Within the Past Month: 0   Needed: 0  Support: 0  Verbalizes Financial Concerns: 0  Transportation: 0  Psychological Factors (+1 each): Emotional during conversation  History of noncompliance/frequent no shows and cancellations: 0  Verbalizes the need for more education: 1  Other Factors (+1 for Each): 0  Navigation Acuity: 6     Follow Up  No follow-ups on file.       "

## 2024-02-29 ENCOUNTER — PATIENT MESSAGE (OUTPATIENT)
Dept: GENETICS | Facility: CLINIC | Age: 64
End: 2024-02-29
Payer: COMMERCIAL

## 2024-03-07 ENCOUNTER — TUMOR BOARD CONFERENCE (OUTPATIENT)
Dept: SURGERY | Facility: CLINIC | Age: 64
End: 2024-03-07
Payer: COMMERCIAL

## 2024-03-07 ENCOUNTER — HOSPITAL ENCOUNTER (OUTPATIENT)
Dept: RADIOLOGY | Facility: HOSPITAL | Age: 64
Discharge: HOME | End: 2024-03-07
Attending: SURGERY
Payer: COMMERCIAL

## 2024-03-07 DIAGNOSIS — C77.3 MALIGNANT NEOPLASM METASTATIC TO LYMPH NODE OF AXILLA: ICD-10-CM

## 2024-03-07 DIAGNOSIS — C50.412 MALIGNANT NEOPLASM OF UPPER-OUTER QUADRANT OF LEFT BREAST IN FEMALE, ESTROGEN RECEPTOR POSITIVE: ICD-10-CM

## 2024-03-07 DIAGNOSIS — Z17.0 MALIGNANT NEOPLASM OF UPPER-OUTER QUADRANT OF LEFT BREAST IN FEMALE, ESTROGEN RECEPTOR POSITIVE: ICD-10-CM

## 2024-03-07 PROCEDURE — 78306 BONE IMAGING WHOLE BODY: CPT | Mod: TC

## 2024-03-07 PROCEDURE — 74177 CT ABD & PELVIS W/CONTRAST: CPT | Mod: 26,,, | Performed by: RADIOLOGY

## 2024-03-07 PROCEDURE — 25500020 PHARM REV CODE 255: Performed by: SURGERY

## 2024-03-07 PROCEDURE — 71260 CT THORAX DX C+: CPT | Mod: 26,,, | Performed by: RADIOLOGY

## 2024-03-07 PROCEDURE — A9503 TC99M MEDRONATE: HCPCS | Performed by: SURGERY

## 2024-03-07 PROCEDURE — 78306 BONE IMAGING WHOLE BODY: CPT | Mod: 26,,, | Performed by: RADIOLOGY

## 2024-03-07 PROCEDURE — A9698 NON-RAD CONTRAST MATERIALNOC: HCPCS | Performed by: SURGERY

## 2024-03-07 PROCEDURE — 74177 CT ABD & PELVIS W/CONTRAST: CPT | Mod: TC

## 2024-03-07 RX ORDER — TC 99M MEDRONATE 20 MG/10ML
20 INJECTION, POWDER, LYOPHILIZED, FOR SOLUTION INTRAVENOUS
Status: COMPLETED | OUTPATIENT
Start: 2024-03-07 | End: 2024-03-07

## 2024-03-07 RX ADMIN — TECHNETIUM TC 99M MEDRONATE 20 MILLICURIE: 20 INJECTION, POWDER, LYOPHILIZED, FOR SOLUTION INTRAVENOUS at 10:03

## 2024-03-07 RX ADMIN — IOHEXOL 75 ML: 350 INJECTION, SOLUTION INTRAVENOUS at 01:03

## 2024-03-07 RX ADMIN — IOHEXOL 1000 ML: 12 SOLUTION ORAL at 12:03

## 2024-03-07 NOTE — PROGRESS NOTES
Interdisciplinary Breast Cancer Conference    Joyce Velarde    Female    Date Presented to Tumor Board: 03/07/24    Presenting Hospital / Clinic: [facility]         Breast Tumor Site: [facility]    Presenter: Lynda Bhandari MD    Reason for Consultation: Initial Presentation    Specialties Present: Medical Oncology;Hematology;Radiation Oncology;Surgical Oncology;Pathology;Navigation;Research;Genetics;Radiology    Patient Status: a current patient    Treatment to Date: Genetic Counseling    Clinical Trial Eligibility: Not discussed    ER: Positive    PA: Positive    Her2: Negative    Cancer Staging   Malignant neoplasm of upper-outer quadrant of left breast in female, estrogen receptor positive  Staging form: Breast, AJCC 8th Edition  - Clinical stage from 2/25/2024: Stage IB (cT1b, cN1(f), cM0, G2, ER+, PA+, HER2-) - Signed by Lynda Bhandari MD on 2/25/2024      Recommended Plan: Surgery;Genetic Testing;Imaging;Post-operative radiation therapy;Additional Observation  Pt has pending genetic testing, havign CT CAP and bone scan 3/7 to see Dr. Tran with medical oncology 3/8/24 to discuss adjuvant recommendations. Pt to proceed to surgery first- undecided on breast surgery, to have axiallary lymph node sampling at time of primary surgery. Pt to see radiation oncology if indicated post operatively.     Metastatic Site(s): None    Presentation at Cancer Conference: Prospective

## 2024-03-08 ENCOUNTER — TELEPHONE (OUTPATIENT)
Dept: HEMATOLOGY/ONCOLOGY | Facility: CLINIC | Age: 64
End: 2024-03-08
Payer: COMMERCIAL

## 2024-03-08 ENCOUNTER — PATIENT OUTREACH (OUTPATIENT)
Dept: SURGERY | Facility: CLINIC | Age: 64
End: 2024-03-08
Payer: COMMERCIAL

## 2024-03-08 DIAGNOSIS — C50.412 MALIGNANT NEOPLASM OF UPPER-OUTER QUADRANT OF LEFT BREAST IN FEMALE, ESTROGEN RECEPTOR POSITIVE: Primary | ICD-10-CM

## 2024-03-08 DIAGNOSIS — Z17.0 MALIGNANT NEOPLASM OF UPPER-OUTER QUADRANT OF LEFT BREAST IN FEMALE, ESTROGEN RECEPTOR POSITIVE: Primary | ICD-10-CM

## 2024-03-08 PROCEDURE — 99358 PROLONG SERVICE W/O CONTACT: CPT | Mod: S$GLB,,, | Performed by: SURGERY

## 2024-03-08 NOTE — PROGRESS NOTES
The patient was submitted for evaluation in multidisciplinary breast cancer conference ([facility]). Total time spent on preparation for [facility] was 35 minutes, which included review of the past medical history from the PCP, review of the mammogram and other imaging reports, care coordination with medical and radiation oncology, and documentation in the medical record.     Patient presented prospectively at [facility] for treatment recommendations. All radiology reviewed. Consensus agreed with upfront surgery with targeted axillary dissection. We are awaiting genetic testing results to determine surgical type. Medical oncology to perform oncotype on final pathology for adjuvant recommendations.  Staging CT C/A/P and bone scan resulted with no metastatic disease.

## 2024-03-08 NOTE — TELEPHONE ENCOUNTER
Call placed to patient regarding missed appointment at 2 pm today. Patient stated that she was unable to login and was unable to come to the [place] for assistance with logging in d/t traffic. Informed patient that Dr. Redman will review her schedule for next and that I will be calling her back on Monday to get her scheduled for an appointment one day next week. Patient verbalized all understanding. Call ended well.

## 2024-03-13 ENCOUNTER — OFFICE VISIT (OUTPATIENT)
Dept: HEMATOLOGY/ONCOLOGY | Facility: CLINIC | Age: 64
End: 2024-03-13
Payer: COMMERCIAL

## 2024-03-13 VITALS
WEIGHT: 161.63 LBS | RESPIRATION RATE: 18 BRPM | BODY MASS INDEX: 27.59 KG/M2 | SYSTOLIC BLOOD PRESSURE: 130 MMHG | DIASTOLIC BLOOD PRESSURE: 87 MMHG | HEIGHT: 64 IN | HEART RATE: 95 BPM | OXYGEN SATURATION: 98 % | TEMPERATURE: 98 F

## 2024-03-13 DIAGNOSIS — C77.3 MALIGNANT NEOPLASM METASTATIC TO LYMPH NODE OF AXILLA: ICD-10-CM

## 2024-03-13 DIAGNOSIS — Z17.0 MALIGNANT NEOPLASM OF UPPER-OUTER QUADRANT OF LEFT BREAST IN FEMALE, ESTROGEN RECEPTOR POSITIVE: ICD-10-CM

## 2024-03-13 DIAGNOSIS — C50.412 MALIGNANT NEOPLASM OF UPPER-OUTER QUADRANT OF LEFT BREAST IN FEMALE, ESTROGEN RECEPTOR POSITIVE: ICD-10-CM

## 2024-03-13 PROCEDURE — 3079F DIAST BP 80-89 MM HG: CPT | Mod: CPTII,S$GLB,, | Performed by: INTERNAL MEDICINE

## 2024-03-13 PROCEDURE — 1159F MED LIST DOCD IN RCRD: CPT | Mod: CPTII,S$GLB,, | Performed by: INTERNAL MEDICINE

## 2024-03-13 PROCEDURE — 99999 PR PBB SHADOW E&M-EST. PATIENT-LVL IV: CPT | Mod: PBBFAC,,, | Performed by: INTERNAL MEDICINE

## 2024-03-13 PROCEDURE — 99205 OFFICE O/P NEW HI 60 MIN: CPT | Mod: S$GLB,,, | Performed by: INTERNAL MEDICINE

## 2024-03-13 PROCEDURE — 3075F SYST BP GE 130 - 139MM HG: CPT | Mod: CPTII,S$GLB,, | Performed by: INTERNAL MEDICINE

## 2024-03-13 PROCEDURE — 3008F BODY MASS INDEX DOCD: CPT | Mod: CPTII,S$GLB,, | Performed by: INTERNAL MEDICINE

## 2024-03-13 RX ORDER — PHENTERMINE HYDROCHLORIDE 37.5 MG/1
37.5 TABLET ORAL
COMMUNITY
Start: 2024-02-03 | End: 2024-05-14 | Stop reason: ALTCHOICE

## 2024-03-13 NOTE — PROGRESS NOTES
[place] [facility]  [address] [facility] [address]  Phone: [phone];  Fax: [phone]    Patient ID: Joyce Velarde   Chief Complaint: Establish Care and Breast Cancer  MRN:  [id number]     Oncologic Diagnosis:  L Stage IB (T1b N1 Mx) Breast Invasive Mammary Carcinoma, G2, +/+/-, ki-67 40%  Previous Treatment:  N/A  Current Treatment:  Pending  Subjective   Joyce Velarde is a 64 y.o. female who presents to clinic to establish [facility] on referral for newly diagnosed breast cancer.    The patient had left breast abnormality detected on screening mammogram in suspicious appearing lymph node.  Biopsy resulted as positive for invasive mammary carcinoma in the breast, and the lymph nodes positive as well.  The patient denies any breast signs or symptoms.  She has a significant family history of malignancy in his already been referred to the genetic counselor.    She has no acute complaints today.  I reviewed her biopsy pathology as well as imaging.  Thankfully there is no signs of distant metastatic disease.  Discussed with her Oncotype DX testing and how it we will form a treatment decision.  I also discussed that depending on final pathology the test may be moot.  If she has other lymph nodes positive I will be recommending chemotherapy even if she has a lower Oncotype score.  She is in agreement to comply with whatever her treatment team recommends.          Review of Systems:  Review of Systems   Constitutional:  Negative for activity change, appetite change, chills, diaphoresis, fatigue, fever and unexpected weight change.   HENT:  Negative for nosebleeds.    Respiratory:  Negative for shortness of breath.    Cardiovascular:  Negative for chest pain.   Gastrointestinal:  Negative for abdominal distention, abdominal pain, anal bleeding, blood in stool, constipation, diarrhea, nausea and vomiting.   Genitourinary:  Negative for difficulty urinating and hematuria.  "  Musculoskeletal:  Negative for arthralgias, back pain and myalgias.   Skin:  Negative for rash.   Neurological:  Negative for dizziness, weakness, light-headedness and headaches.   Hematological:  Does not bruise/bleed easily.   Psychiatric/Behavioral:  The patient is not nervous/anxious.      History     Oncology History   Malignant neoplasm of upper-outer quadrant of left breast in female, estrogen receptor positive   2024 Initial Diagnosis    Malignant neoplasm of upper-outer quadrant of left breast in female, estrogen receptor positive     2024 Cancer Staged    Staging form: Breast, AJCC 8th Edition  - Clinical stage from 2024: Stage IB (cT1b, cN1(f), cM0, G2, ER+, OR+, HER2-)         Past Surgical History:   Procedure Laterality Date    BILATERAL TUBAL LIGATION       SECTION      X 2       Family History   Problem Relation Age of Onset    Breast cancer Mother 84        inflammatory    Hypertension Mother     Dementia Mother     Hyperlipidemia Mother     Breast cancer Sister 43        genetic testing negative (~)    Lung cancer Maternal Grandmother         +smoking hx    Heart disease Maternal Grandmother     Heart disease Maternal Grandfather     Breast cancer Other     Breast cancer Maternal Cousin 53        genetic testing negative ()    Breast cancer Paternal Cousin        Review of patient's allergies indicates:  No Known Allergies    Social History     Tobacco Use    Smoking status: Never    Smokeless tobacco: Never   Substance Use Topics    Alcohol use: Yes    Drug use: Never       Physical Exam   ECOG:   ECOG SCORE    0 - Fully active-able to carry on all pre-disease performance without restriction          Vitals:  /87   Pulse 95   Temp 97.7 °F (36.5 °C)   Resp 18   Ht 5' 4" (1.626 m)   Wt 73.3 kg (161 lb 9.6 oz)   SpO2 98%   BMI 27.74 kg/m²     Physical Exam:  Physical Exam  Constitutional:       General: She is not in acute distress.     Appearance: Normal " appearance. She is not ill-appearing.   HENT:      Head: Normocephalic and atraumatic.   Eyes:      Extraocular Movements: Extraocular movements intact.      Conjunctiva/sclera: Conjunctivae normal.   Cardiovascular:      Rate and Rhythm: Normal rate and regular rhythm.      Heart sounds: No murmur heard.  Pulmonary:      Effort: Pulmonary effort is normal. No respiratory distress.   Abdominal:      Palpations: There is no hepatomegaly or splenomegaly.   Musculoskeletal:      Cervical back: Neck supple. No tenderness.   Skin:     Findings: No rash.   Neurological:      General: No focal deficit present.      Mental Status: She is alert and oriented to person, place, and time.   Psychiatric:         Mood and Affect: Mood normal.         Behavior: Behavior normal.         Thought Content: Thought content normal.        Labs   Labs:  No visits with results within 2 Day(s) from this visit.   Latest known visit with results is:   Lab Visit on 02/27/2024   Component Date Value Ref Range Status    Creatinine 02/27/2024 0.7  0.5 - 1.4 mg/dL Final    eGFR 02/27/2024 >60  >60 mL/min/1.73 m^2 Final    Miscellaneous Genetic Test Name 02/27/2024 See BELOW   Final    Comment: BRCA1/2 Analyses with BRCANext Expanded Cancer Panel  Use [facility] kit in lab          Pathology   Specimen to Pathology, Radiology Breast, needle biopsy - 02/21/2024      Component 3 wk ago   Final Pathologic Diagnosis     Part 1  Breast, left, 1 o'clock position, ultrasound-guided biopsy:  Invasive mammary carcinoma, moderately differentiated  [place] grade 2:  Tubule formation-3, nuclear pleomorphism-2 and mitotic count -1  Invasive carcinoma measures 7 mm in greatest dimension  No carcinoma in-situ or lymphovascular invasion is identified  All submitted cores are involved by invasive carcinoma      Part 2  Axilla, left, ultrasound-guided biopsy:  Positive for metastatic carcinoma  Metastatic focus measures 4.5 mm in greatest dimension  Extranodal  extension not definitively identified    ** tumor biomarkers and additional stains will be performed with results provided in a supplemental report.    This case was reviewed by Dr. RODNEY Hankins who concurs with the above diagnoses. VC      Comment: Interp By Geeta Saunders M.D., Signed on 02/26/2024 at 11:35   Supplemental Diagnosis     Tumor biomarkers  Estrogen receptor (ER): Positive, 90-95%, strong  Progesterone receptor (PGR): Positive, 20-30%, weak to intermediate  HER2 (IHC):  Negative, 0  Ki-67: 40%    Additional biomarkers:  AE1/AE3: Highlights the extent of invasive carcinoma  E cadherin: Displays strong membraneous staining, supporting ductal differentiation  P63:  Displays an absence of staining for myoepithelial cells, supporting the diagnosis of invasive carcinoma    All immunostains were performed with appropriate controls. VC      Gross     Pathology ID:  [id number]  Patient ID:  [id number]  Received in 2 parts:  Part 1:  Pathology ID:  [id number]  Patient ID:  [id number]  The specimen is received in formalin labeled &quot;left breast 1:00 in formalin 8:56&quot;.  The specimen consists of multiple yellow needle biopsy fragments measuring 1.7 x 1.1 x 0.1 cm in aggregate.  The specimen is submitted entirely in cassette  FMY--1-A.    Specimen has been in formalin for more than 6 hours and less than 72 hours.  Ischemic time is not provided.    Part 2:  Pathology ID:  [id number]  Patient ID:  [id number]  The specimen is received in formalin labeled &quot;left axilla in formalin 9:16&quot;.  The specimen consists of 5 tan needle biopsy fragments measuring 1.4 x 0.8 x 0.1 cm in aggregate.  The specimen is submitted entirely in cassette RAE--2-A.    Specimen has been in formalin for more than 6 hours and less than 72 hours.  Ischemic time is not provided.  Raphael Lemus                Imaging   CT CHEST ABDOMEN PELVIS WITH IV CONTRAST (XPD) - 03/07/2024  TECHNIQUE:  Low dose axial images, sagittal and coronal  reformations were obtained from the thoracic inlet to the pubic symphysis following the IV administration of 75 mL of Omnipaque 350 .  Oral contrast administered.  COMPARISON:  None     FINDINGS:  Chest:  Heart and great vessels: Mild atherosclerotic calcification  Adenopathy: No pathologically enlarged axillary, mediastinal or hilar lymph nodes.  Lungs: No concerning abnormality.  Abdomen:  Liver: Multiple small hypodensities, too small to characterize but likely cysts or hemangiomas.  Gallbladder and biliary: Unremarkable.  Spleen: Unremarkable.  Pancreas: Unremarkable.  Adrenals: Unremarkable.  Kidneys: Unremarkable.  Stomach/Bowel: Stomach is unremarkable.  Small bowel nonobstructive.  No concerning colonic abnormality.  Peritoneum: No ascites or pneumoperitoneum.  Abdominal Adenopathy: Central mesenteric fat stranding present with multiple small to upper limits of normal mesenteric lymph nodes.  Vasculature: Mild atherosclerotic plaquing present.  Pelvis:  Urinary bladder: Unremarkable.  Pelvis adenopathy: None.  Bones: No acute abnormality.  Well-circumscribed T3 lucent focus, suspected hemangioma.  Miscellaneous: Known left breast malignancy noted.  Biopsy clip noted within small left axillary lymph node as well.     Impression:  Known left breast malignancy without definite distant metastatic disease.  Central mesenteric fat stranding with small to upper limits normal lymph nodes.  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  Finding can also be idiopathic or associated with lymphoma and other entities. Lymphoma and other entities felt less likely.  Attention on follow-up.      NM BONE SCAN WHOLE BODY - 03/07/2024  CLINICAL HISTORY:  Breast cancer, invasive, stage I/II/III, initial workup;  Secondary and unspecified malignant neoplasm of axilla and upper limb lymph nodes     TECHNIQUE:  Following the IV administration of 20 mCi  of Tc-99m-MDP, anterior and posterior delayed whole body images were acquired.     COMPARISON:  None.     FINDINGS:  There is physiologic distribution of the radiopharmaceutical throughout the skeleton. Both kidneys and the bladder appear normal.     Impression:  There is no scintigraphic evidence of metastatic disease.    Assessment and Plan   L Stage IB (T1b N1 Mx) Breast Invasive Mammary Carcinoma, G2, +/+/-, ki-67 40%  TB Presentation 03/07/2024: consensus for upfront surgery, genetic testing, systemic imaging, post op radiation and additional observation  Staging CT CAP and Bone Scan showed no evidence of metastatic disease  I discussed the role of medical oncology in her care.  I discussed that regardless of her final surgical pathology, endocrine therapy will be recommended given the hormone make a plan for tumor.  Also discussed with her the details of the Oncotype DX testing and what information will be gained from obtaining the test in regards to adjuvant chemotherapy.  Given that she is node positive, if additional nodes are positive at surgery,  I will recommend that she have adjuvant chemotherapy.  She is planning to comply with whatever recommendations her treatment team makes.      Cancer Screening  PAP Smear: Due and scheduled in May 2024  Colonoscopy 06/29/2020: patient reports she had a few polyps; not sure when she was due for another one; will refer to GI for colonoscopy after she has completed treatment for breast cancer        Med Onc Chart Routing      Follow up with physician 3 weeks.   Follow up with WILL    Infusion scheduling note    Injection scheduling note    Labs CBC, CMP, magnesium and phosphorus   Scheduling:  Preferred lab:  Lab interval:     Imaging    Pharmacy appointment    Other referrals              The patient was seen, interviewed and examined. Pertinent lab and radiologic studies were reviewed. Pt instructed to call should they develop concerning signs/symptoms or have  further questions.        Portions of the record may have been created with voice recognition software. Occasional wrong-word or sound-a-like substitutions may have occurred due to the inherent limitations of voice recognition software. Read the chart carefully and recognize, using context, where substitutions have occurred.      Sandi Tran MD    Hematology/Oncology

## 2024-03-18 ENCOUNTER — PATIENT MESSAGE (OUTPATIENT)
Dept: GENETICS | Facility: CLINIC | Age: 64
End: 2024-03-18
Payer: COMMERCIAL

## 2024-03-18 DIAGNOSIS — Z01.818 PRE-OP EXAM: Primary | ICD-10-CM

## 2024-03-18 DIAGNOSIS — C50.412 MALIGNANT NEOPLASM OF UPPER-OUTER QUADRANT OF LEFT BREAST IN FEMALE, ESTROGEN RECEPTOR POSITIVE: Primary | ICD-10-CM

## 2024-03-18 DIAGNOSIS — C50.412 MALIGNANT NEOPLASM OF UPPER-OUTER QUADRANT OF LEFT BREAST, ESTROGEN RECEPTOR POSITIVE: ICD-10-CM

## 2024-03-18 DIAGNOSIS — Z17.0 MALIGNANT NEOPLASM OF UPPER-OUTER QUADRANT OF LEFT BREAST, ESTROGEN RECEPTOR POSITIVE: ICD-10-CM

## 2024-03-18 DIAGNOSIS — Z17.0 MALIGNANT NEOPLASM OF UPPER-OUTER QUADRANT OF LEFT BREAST IN FEMALE, ESTROGEN RECEPTOR POSITIVE: Primary | ICD-10-CM

## 2024-03-18 RX ORDER — CEFAZOLIN SODIUM 2 G/50ML
2 SOLUTION INTRAVENOUS
Status: CANCELLED | OUTPATIENT
Start: 2024-03-18

## 2024-03-18 RX ORDER — SODIUM CHLORIDE 9 MG/ML
INJECTION, SOLUTION INTRAVENOUS CONTINUOUS
Status: CANCELLED | OUTPATIENT
Start: 2024-03-18

## 2024-03-19 ENCOUNTER — OFFICE VISIT (OUTPATIENT)
Dept: INTERNAL MEDICINE | Facility: CLINIC | Age: 64
End: 2024-03-19
Payer: COMMERCIAL

## 2024-03-19 ENCOUNTER — HOSPITAL ENCOUNTER (OUTPATIENT)
Dept: RADIOLOGY | Facility: HOSPITAL | Age: 64
Discharge: HOME | End: 2024-03-19
Attending: SURGERY
Payer: COMMERCIAL

## 2024-03-19 ENCOUNTER — HOSPITAL ENCOUNTER (OUTPATIENT)
Dept: CARDIOLOGY | Facility: HOSPITAL | Age: 64
Discharge: HOME | End: 2024-03-19
Attending: SURGERY
Payer: COMMERCIAL

## 2024-03-19 ENCOUNTER — ANESTHESIA EVENT (OUTPATIENT)
Dept: SURGERY | Facility: HOSPITAL | Age: 64
End: 2024-03-19
Payer: COMMERCIAL

## 2024-03-19 ENCOUNTER — OFFICE VISIT (OUTPATIENT)
Dept: SURGERY | Facility: CLINIC | Age: 64
End: 2024-03-19
Payer: COMMERCIAL

## 2024-03-19 ENCOUNTER — PATIENT MESSAGE (OUTPATIENT)
Dept: PREADMISSION TESTING | Facility: HOSPITAL | Age: 64
End: 2024-03-19
Payer: COMMERCIAL

## 2024-03-19 VITALS — OXYGEN SATURATION: 100 % | RESPIRATION RATE: 18 BRPM | TEMPERATURE: 98 F | HEART RATE: 68 BPM

## 2024-03-19 DIAGNOSIS — C77.3 MALIGNANT NEOPLASM METASTATIC TO LYMPH NODE OF AXILLA: ICD-10-CM

## 2024-03-19 DIAGNOSIS — C50.412 MALIGNANT NEOPLASM OF UPPER-OUTER QUADRANT OF LEFT BREAST IN FEMALE, ESTROGEN RECEPTOR POSITIVE: ICD-10-CM

## 2024-03-19 DIAGNOSIS — R92.8 ABNORMAL MAMMOGRAM: ICD-10-CM

## 2024-03-19 DIAGNOSIS — Z17.0 MALIGNANT NEOPLASM OF UPPER-OUTER QUADRANT OF LEFT BREAST IN FEMALE, ESTROGEN RECEPTOR POSITIVE: ICD-10-CM

## 2024-03-19 DIAGNOSIS — M26.609 TMJ (TEMPOROMANDIBULAR JOINT SYNDROME): Primary | ICD-10-CM

## 2024-03-19 DIAGNOSIS — C50.412 MALIGNANT NEOPLASM OF UPPER-OUTER QUADRANT OF LEFT BREAST IN FEMALE, ESTROGEN RECEPTOR POSITIVE: Primary | ICD-10-CM

## 2024-03-19 DIAGNOSIS — Z01.818 PRE-OP EXAM: ICD-10-CM

## 2024-03-19 DIAGNOSIS — Z17.0 MALIGNANT NEOPLASM OF UPPER-OUTER QUADRANT OF LEFT BREAST IN FEMALE, ESTROGEN RECEPTOR POSITIVE: Primary | ICD-10-CM

## 2024-03-19 LAB
OHS QRS DURATION: 76 MS
OHS QTC CALCULATION: 425 MS

## 2024-03-19 PROCEDURE — 99499 UNLISTED E&M SERVICE: CPT | Mod: S$GLB,,, | Performed by: ANESTHESIOLOGY

## 2024-03-19 PROCEDURE — 93005 ELECTROCARDIOGRAM TRACING: CPT

## 2024-03-19 PROCEDURE — A4648 IMPLANTABLE TISSUE MARKER: HCPCS

## 2024-03-19 PROCEDURE — 10035 PLMT SFT TISS LOCLZJ DEV 1ST: CPT | Mod: LT

## 2024-03-19 PROCEDURE — 99999 PR PBB SHADOW E&M-EST. PATIENT-LVL I: CPT | Mod: PBBFAC,,, | Performed by: SURGERY

## 2024-03-19 PROCEDURE — 77065 DX MAMMO INCL CAD UNI: CPT | Mod: 26,LT,, | Performed by: RADIOLOGY

## 2024-03-19 PROCEDURE — 77065 DX MAMMO INCL CAD UNI: CPT | Mod: TC,LT

## 2024-03-19 PROCEDURE — 93010 ELECTROCARDIOGRAM REPORT: CPT | Mod: ,,, | Performed by: INTERNAL MEDICINE

## 2024-03-19 PROCEDURE — 99214 OFFICE O/P EST MOD 30 MIN: CPT | Mod: 57,S$GLB,, | Performed by: SURGERY

## 2024-03-19 PROCEDURE — 10035 PLMT SFT TISS LOCLZJ DEV 1ST: CPT | Mod: 59,LT,, | Performed by: RADIOLOGY

## 2024-03-19 PROCEDURE — 99999 PR PBB SHADOW E&M-EST. PATIENT-LVL III: CPT | Mod: PBBFAC,,,

## 2024-03-19 PROCEDURE — 19285 PERQ DEV BREAST 1ST US IMAG: CPT | Mod: LT,,, | Performed by: RADIOLOGY

## 2024-03-19 RX ORDER — VITAMIN B COMPLEX
1 CAPSULE ORAL DAILY
COMMUNITY

## 2024-03-19 RX ORDER — CHOLECALCIFEROL (VITAMIN D3) 25 MCG
1000 TABLET ORAL DAILY
COMMUNITY

## 2024-03-19 RX ORDER — ASCORBIC ACID 500 MG
500 TABLET ORAL DAILY
COMMUNITY

## 2024-03-19 NOTE — PROGRESS NOTES
Breast Surgical Oncology  [place]    Date of Service: 2024    SUBJECTIVE:   Chief complaint: left breast cancer    HISTORY OF PRESENT ILLNESS:   Joyce Velarde is a 64 y.o. female who is kindly referred by Dr. Jazmine Daniel for left breast cancer.    24  A left breast abnormality was identified on routine screening mammography.  Focused sonographic evaluation revealed a 0.8 mm x 0.6 mm x 0.6 mm oval, hypoechoic mass with spiculated margins with shadowing seen in the upper outer quadrant of the left breast at 1 o'clock in the middle depth, 5 cm from the nipple. The mass correlates with the prior mammogram finding.  Additionally, there is abnormal appearing lymph node in the left axilla measuring 0.7 x 0.7 x 0.6 cm demonstrating eccentric cortical thickening measuring 4 mm in thickness. Core needle biopsy of the breast mass and lymph node confirmed hormone receptor positive, [id number] negative invasive ductal carcinoma. She denies breast concerns such as pain, masses, skin changes, nipple discharge, nipple retraction or lumps under the arm.  She denies prior breast surgery or biopsy.     3/19/24  Genetic testing is negative for a germline mutation. Staging scan are negative for distant metastatic disease. She is here for surgical planning and localization is scheduled for today.     Her breast cancer risk factor profile is as follows: Menarche at 15, Menopause at 50.  She is . Age at first live birth was 25. Family history of cancer is as follows: mother breast cancer at 85 inflammatory breast cancer, sister breast cancer at late 40's- HER 2 positive- had genetic testing and negative for mutation     FAMILY HISTORY:     Family History   Problem Relation Age of Onset    Breast cancer Mother 84        inflammatory    Hypertension Mother     Dementia Mother     Hyperlipidemia Mother     Breast cancer Sister 43        genetic testing negative (~)    Lung cancer Maternal Grandmother          +smoking hx    Heart disease Maternal Grandmother     Heart disease Maternal Grandfather     Breast cancer Other     Breast cancer Maternal Cousin 53        genetic testing negative (2015)    Breast cancer Paternal Cousin         PAST MEDICAL HISTORY:   No past medical history on file.    SURGICAL HISTORY:     Past Surgical History:   Procedure Laterality Date    BILATERAL TUBAL LIGATION       SECTION      X 2       SOCIAL HISTORY:     Social History     Tobacco Use    Smoking status: Never    Smokeless tobacco: Never   Substance Use Topics    Alcohol use: Yes    Drug use: Never        MEDICATIONS/ALLERGIES:     Current Outpatient Medications:     phentermine (ADIPEX-P) 37.5 mg tablet, Take 37.5 mg by mouth before breakfast., Disp: , Rfl:   Review of patient's allergies indicates:  No Known Allergies    REVIEW OF SYSTEMS:   I have reviewed 12 systems, including 2 points per system. Pertinent reported positives are: none    PHYSICAL EXAM:   General: The patient appears well and is in no acute distress.     Corinna Davenport MA was present as a chaperone for the examination.   BREAST EXAM  No Asymmetry  Right:  - Mass: No  - Skin change: No  - Nipple Discharge: No  - Nipple retraction: No  - Axillary LAD: No  Left:   - Mass: No  - Skin change: No  - Nipple Discharge: No  - Nipple retraction: No  - Axillary LAD: No    IMAGING:   Images were personally reviewed.   Results for orders placed in visit on 24    US Breast Left Limited    Narrative  Result:  US Breast Left Limited    History:  Patient is 64 y.o. and is seen for diagnostic imaging.    Films Compared:  Compared to: 02/15/2024 Mammo Digital Screening Bilat w/ Trevor and 2022 Mammo Digital Screening Bilat w/ Trevor    Findings:    There is a 0.8 mm x 0.6 mm x 0.6 mm oval, hypoechoic mass with spiculated margins with shadowing seen in the upper outer quadrant of the left breast at 1 o'clock in the middle depth, 5 cm from the nipple. The mass  correlates with the prior mammogram finding.  Additionally, there is abnormal appearing lymph node in the left axilla measuring 0.7 x 0.7 x 0.6 cm demonstrating eccentric cortical thickening measuring 4 mm in thickness    Impression  Left  Mass: Left breast 0.8 mm x 0.6 mm x 0.6 mm mass at the upper outer middle 1 o'clock position.  Suspicious left axillary lymph node.  Assessment: 4 - Suspicious finding.  Ultrasound-guided biopsy is recommended for both of these findings.  Results were discussed with the patient and the ordering physician's office was notified.    BI-RADS Category:  Overall: 4 - Suspicious      Recommendation:  Biopsy is recommended.    PATHOLOGY:     Lab Results   Component Value Date    FPATHDX  02/21/2024     Part 1   Breast, left, 1 o'clock position, ultrasound-guided biopsy:  Invasive mammary carcinoma, moderately differentiated  [place] grade 2:  Tubule formation-3, nuclear pleomorphism-2 and mitotic count -1   Invasive carcinoma measures 7 mm in greatest dimension   No carcinoma in-situ or lymphovascular invasion is identified  All submitted cores are involved by invasive carcinoma      Part 2   Axilla, left, ultrasound-guided biopsy:   Positive for metastatic carcinoma  Metastatic focus measures 4.5 mm in greatest dimension  Extranodal extension not definitively identified    ** tumor biomarkers and additional stains will be performed with results provided in a supplemental report.    This case was reviewed by Dr. RODNEY Hankins who concurs with the above diagnoses.         ASSESSMENT:     1. Malignant neoplasm of upper-outer quadrant of left breast in female, estrogen receptor positive          PLAN:     Joyce Velarde is a 64 y.o. female who is new diagnosis of Stage IB (T1b N1 Mx) LEFT Breast Invasive Mammary Carcinoma, Grade 2, ER 90-95%, VA 20-30%, [id number] 0 with a ki67 of 40%.    We again have discussed the surgical choices of lumpectomy with radiation and mastectomy with or  without radiation.  I have explained that the survival is the same, regardless of the surgery chosen.  We have discussed that the local recurrence rate following mastectomy is 2-5%.  I have explained that the local recurrence rate was slightly higher following breast conservation in the large trials that compared mastectomy and breast conservation. However, with current medical therapies, local recurrence has been reduced to as low as 6%. I did review with her the general schedule and side effects of radiation.    We reviewed the need for a targeted sentinel lymph node biopsy to further stage the axilla because her axillary disease is not clinically appreciable on examination.     After careful consideration of her options, she has elected for breast conserving surgery. She will be scheduled for a left partial mastectomy with targeted axillary lymph node dissection. GEETA localization is performed today for the breast and positive lymph node. The risks of surgery were discussed with the patient, including pain, bleeding, infections, scarring, numbness, lymphedema, injury to adjacent structures such as nerves that affect movement of the arm, need for additional surgery for margins or lymph nodes, need for additional treatments and recurrence.  She has provided informed consent.      I spent a total of 30 minutes on this visit. This includes face to face time and non-face to face time preparing to see the patient (eg, review of tests), obtaining and/or reviewing separately obtained history, documenting clinical information in the electronic or other health record, independently interpreting results and communicating results to the patient/family/caregiver, or care coordinator.      Lynda Bhandari M.D.

## 2024-03-19 NOTE — ANESTHESIA PREPROCEDURE EVALUATION
2024  Joyce Velarde is a 64 y.o., female.    No past medical history on file.  Past Surgical History:   Procedure Laterality Date    BILATERAL TUBAL LIGATION       SECTION      X 2       Pre-op Assessment    I have reviewed the Patient Summary Reports.    I have reviewed the NPO Status.   I have reviewed the Medications.     Review of Systems  Anesthesia Hx:  No problems with previous Anesthesia   History of prior surgery of interest to airway management or planning:  Previous anesthesia: General        Denies Family Hx of Anesthesia complications.   Personal Hx of Anesthesia complications, Post-Operative Nausea/Vomiting                    Social:  Non-Smoker       Hematology/Oncology:  Hematology Normal                     Current/Recent Cancer.  Breast              Cardiovascular:  Cardiovascular Normal                                            Pulmonary:  Pulmonary Normal                       Renal/:  Renal/ Normal                 Hepatic/GI:  Hepatic/GI Normal                 Neurological:  Neurology Normal                                      Psych:  Psychiatric Normal                    Physical Exam  General: Well nourished    Airway:  Mallampati: II   Mouth Opening: Normal  TM Distance: Normal  Tongue: Normal  Neck ROM: Normal ROM    Dental:  Intact        Anesthesia Plan  Type of Anesthesia, risks & benefits discussed:    Anesthesia Type: Gen ETT, Gen Supraglottic Airway  Intra-op Monitoring Plan: Standard ASA Monitors  Post Op Pain Control Plan: multimodal analgesia and IV/PO Opioids PRN  Induction:  IV  Informed Consent: Informed consent signed with the Patient and all parties understand the risks and agree with anesthesia plan.  All questions answered. Patient consented to blood products? No  ASA Score: 2  Day of Surgery Review of History & Physical: H&P Update referred  to the surgeon/provider.    Ready For Surgery From Anesthesia Perspective.     .

## 2024-03-19 NOTE — NURSING
Pressure held on left breast radar site x2for 10 mins, hemostasis was achieved, steri strips were applied.  Dressing clean, dry and intact with no drainage noted.  Discharge instructions given verbally , patient voiced understandings.  Patient discharged and accompanied by family member. Radars audible with probe.

## 2024-03-19 NOTE — DISCHARGE INSTRUCTIONS
To confirm, your doctor has instructed you: Surgery is scheduled for 03/20/24.   ARRIVAL TIME: 5:30 A.M.    Surgery will be at [facility],  The address is [id number] [place] [address]. [place] [address].      IMPORTANT INSTRUCTIONS!    Do not eat or drink after 12 midnight, including water. Do not smoke or use chewing tobacco after 12 midnight!  OK to brush teeth, but no gum, candy, or mints!      *Take only these medicines with a small swallow of water-morning of surgery*     none         ____ Stop taking all vitamins, herbal supplements, Aspirin, & NSAIDS (Ibuprofen, Advil, Aleve) 7 days prior to surgery, as these can thin your blood.    ____ Weight loss medication, such as Adipex and Phentermine, must be stopped 14 days prior to surgery, no exceptions!    *Diabetic Patients: If you take diabetic or weight loss medication, do NOT take morning of surgery unless instructed by Doctor. Metformin to be stopped 24 hrs prior to surgery. DO NOT take short-acting insulin the day of surgery. Only take HALF of your regular dose of long-acting insulin the night before surgery, unless instructed otherwise. Blood sugars will be checked in pre-op.   ~Ozempic/Mounjaro/Wegovy/Trulicity/Semaglutide injections must be stopped 7 days prior to surgery.     Please notify MD office if you develop an active infection, are prescribed antibiotics by someone other than the surgeon doing your surgery, or visit urgent care/ER.      Bathing Instructions:   The night before surgery and the morning prior to coming to the hospital:    - Shower & rinse your body as usual with anti-bacterial Soap (Dial or Lever 2000)   -Hibiclens (if indicated) use AFTER anti-bacterial soap; 1 packet PM/1 packet in AM on surgical site only   -Do not use hibiclens on your head, face, or genitals.    -Do not wash with anti-bacterial soap after you use the hibiclens.    -Do not shave surgical site 5-7 days prior to surgery.    -Pubic hair 7 days prior to surgery  (OBGYN/Urology only).       Additional Instructions:   __ No powder, lotions, creams, or body spray to skin   __ No deodorant if you are having: breast procedure, PORT, or upper shoulder surgery!   __ No nail polish or artificial nails       **SURGERY WILL BE CANCELLED IF ARTIFICIAL/NAIL POLISH IS PRESENT!!!**  __ Please remove all piercings and leave all jewelry at home.    **SURGERY WILL BE CANCELLED IF PIERCINGS ARE PRESENT!!!**    __ Dentures, Hearing Aids and Contact Lens need to be removed prior to the start of surgery.    __ Avoid Alcoholic beverages 3 days prior to surgery, as it can thin the blood, unless told otherwise by pre-admit department.  __ Females: may need to give a urine sample the morning of surgery;   **Please ask  for a specimen cup if you need to use the restroom prior to being called into pre-op.**  __ Males: Stop ED medications (Viagra, Cialis) 24 hrs prior to surgery.  __ Wear clean, loose-fitting clothing. Allow for dressings/bandages/surgical equipment   __ You must have transportation, and they MUST stay the entire time.   __  Bring photo ID and insurance information to hospital [facility] Visitor/Ride Policy:   Only 1 adult allowed (over the age of 18) to accompany you and MUST STAY through the entire length of admission.     -Must have a ride home from a responsible adult that you know and trust.    -Medical Transport, Uber or Lyft can only be used if patient has a responsible adult to accompany them during ride home.    ~Your ride MUST STAY the entire time until you are discharged~        Post-Op Instructions: You will receive surgery post-op instructions by your Discharge Nurse prior to going home.   Surgical Site Infection:   Prevention of surgical site infections:   -Keep incisions clean and dry.   -Do not soak/submerge incisions in water until completely healed.   -Do not apply lotions, powders, creams, or deodorants to site.   -Always make sure hands are cleaned with  antibacterial soap/ alcohol-based  prior to touching the surgical site.       Signs and symptoms:               -Redness and pain around the area where you had surgery               -Drainage of cloudy fluid from your surgical wound               -Fever over 100.4 or chills     >>>Call Surgeon office/on-call Surgeon if you experience any of these signs & symptoms post-surgery @ [phone].    *If you are running late or have questions the morning of surgery before 7AM, please call the Pre-OP Department @ [phone].    *Please Call [facility] Pre-Admit Department for surgery instruction questions:  [phone]    *Payment questions:  [phone]    *Billing questions:  [phone]

## 2024-03-19 NOTE — ASSESSMENT & PLAN NOTE
Patient presents at the request of Dr. Bhandari who palns on performing a L lumpectomy with bx on 3/20/24   Known risk factors for perioperative complications:  breast cancer, adipex use     Difficulty with intubation is anticipated.  Airway history, patient does have TMJ.     Cardiac Risk Estimation:  Per Revised Cardiac Risk Index patient is a Class I  risk with a 3.9% risk of a major cardiac event.      1.) Preoperative workup as follows: ECG, hemoglobin, hematocrit, electrolytes, creatinine, glucose, liver function studies.  2.) Change in medication regimen before surgery:  Adipex last dose was Sunday.  Patient reports using the medication 5-6 times over the last 2 weeks.  Discussed use with anesthesiologist, we will monitor patient for blood pressure variations throughout the procedure.  Also discussed risk for blood pressure variations with the patient .  3.) Prophylaxis for cardiac events with perioperative beta-blockers: not indicated.  4.) Invasive hemodynamic monitoring perioperatively: not indicated.  5.) Deep vein thrombosis prophylaxis postoperatively: intermittent pneumatic compression boots and regimen to be chosen by surgical team.  6.) Surveillance for postoperative MI with ECG immediately postoperatively and on postoperati ve days 1 and 2 AND troponin levels 24 hours postoperatively and on day 4 or hospital discharge (whichever comes first): not indicated.  7.) Current medications which may produce withdrawal symptoms if withheld perioperatively: none  8.) Other measures:  Elevated today, on recheck 141/72.  On review of prior blood pressure readings patient has been normotensive.  Ask patient to obtain cough and monitor pressures at home if consistently greater than 140/90 discuss with primary care

## 2024-03-19 NOTE — NURSING
"Met with patient during surgical planning visit, general surgical questions answered and treatment plan pathway explained with expectations provided. Pt denies any other needs at this time, pt encouraged to call me should she have any other concerns. We are planning for her to meet with radiation oncology at a later date once pathology and oncotype results are back and she is aware of her next step.     Oncology Navigation   Intake  Date of Diagnosis: 24  Cancer Type: Breast  Type of Referral: Internal  Date of Referral: 24  Initial Nurse Navigator Contact: 24  Referral to Initial Contact Timeline (days): 0  Date Worked: 24  First Appointment Available: 24  Appointment Date: 24  First Available Date vs. Scheduled Date (days): 0  Multiple appointments: No     Treatment  Current Status: Staging work-up  Date Presented to Tumor Board: 24    Surgery: Planned  Surgical Oncologist: Lynda Bhandari MD  Type of Surgery: Left partial mastectomy with left sentinel lymph node biopsy  Consult Date: 24  Surgery Schedule Date: 24    Medical Oncologist: Sandi Tran MD  Consult Date: 24    Radiation Therapy: Planned    Procedures: Bone scan; CT; Genetic test  CT Schedule Date: 24  Genetic Testing Date Sent: 24    General Referrals: Physical Therapy  Physical Therapy: Samina Vieira  Physical Therapy Referral Date: 24    ER: Positive  WA: Positive  Her2: Negative       Support Systems: Spouse/significant other; Children; Family members  Barriers of Care: Barriers to Care "Assessment completed-no barriers noted"     Acuity  Stage: 1  Surgical Procedure Complexity: 2  Treatment Tolerability: Has not started treatment yet/treatment fully completed and side effects resolved  ECO  Comorbidities in Medical History: 1  Hospitalization Within the Past Month: 0   Needed: 0  Support: 0  Verbalizes Financial Concerns: 0  Transportation: " 0  Psychological Factors (+1 each): Emotional during conversation  History of noncompliance/frequent no shows and cancellations: 0  Verbalizes the need for more education: 1  Other Factors (+1 for Each): 0  Navigation Acuity: 6     Follow Up  No follow-ups on file.

## 2024-03-19 NOTE — PROGRESS NOTES
Preoperative History and Physical  [facility]                                                                   Chief Complaint: Preoperative evaluation     History of Present Illness:      Joyce Velarde is a 64 y.o. female with breast cancer and BMI of 27.74 who presents to the office today for a preoperative consultation at the request of Dr. Bhandari  who plans on performing left lumpectomy with rate our localization left lymph node mapping with lymph node biopsy on .     Functional Status:      The patient is able to climb a flight of stairs. The patient is able to ambulate  without difficulty. The patient's functional status is not affected by the surgical problem. The patient's functional status is not affected by shortness of breath, chest pain, dyspnea on exertion and fatigue.  Pt active can walk for exercise, No CP no Sob with activity   MET score greater than 4    Patient Anesthesia History:      History of Malignant Hyperthermia: no  History of Pseudocholinesterase Deficiency: no  History PONV: no  History of difficult intubation: no  History of delayed emergence: no    Family Anesthesia History:      History of Malignant Hyperthermia: no  History of Pseudocholinesterase Deficiency: no     Past Medical History:      Past Medical History:   Diagnosis Date    Cancer     GERD (gastroesophageal reflux disease)         Past Surgical History:      Past Surgical History:   Procedure Laterality Date    BILATERAL TUBAL LIGATION       SECTION      X 2        Social History:      Social History     Socioeconomic History    Marital status:    Tobacco Use    Smoking status: Never    Smokeless tobacco: Never   Substance and Sexual Activity    Alcohol use: Yes     Comment: occ    Drug use: Never    Sexual activity: Yes        Family History:      Family History   Problem Relation Age of Onset    Breast cancer Mother 84        inflammatory     Hypertension Mother     Dementia Mother     Hyperlipidemia Mother     Breast cancer Sister 43        genetic testing negative (~2015)    No Known Problems Brother     No Known Problems Brother     Lung cancer Maternal Grandmother         +smoking hx    Heart disease Maternal Grandmother     Heart disease Maternal Grandfather     Breast cancer Maternal Cousin 53        genetic testing negative (2015)    Breast cancer Paternal Cousin     Breast cancer Other        Allergies:      Review of patient's allergies indicates:  No Known Allergies    Medications:      Current Outpatient Medications   Medication Sig    ascorbic acid, vitamin C, (VITAMIN C) 500 MG tablet Take 500 mg by mouth once daily.    b complex vitamins capsule Take 1 capsule by mouth once daily.    phentermine (ADIPEX-P) 37.5 mg tablet Take 37.5 mg by mouth before breakfast.    vitamin D (VITAMIN D3) 1000 units Tab Take 1,000 Units by mouth once daily.     No current facility-administered medications for this visit.       Vitals:      Vitals:    03/19/24 1235   Pulse: 68   Resp: 18   Temp: 97.5 °F (36.4 °C)       Review of Systems:        Constitutional: Negative for fever, chills, weight loss, malaise/fatigue and diaphoresis.   HENT: Negative for hearing loss, ear pain, nosebleeds, congestion, sore throat, neck pain, tinnitus and ear discharge.    Eyes: Negative for blurred vision, double vision, photophobia, pain, discharge and redness.   Respiratory: Negative for cough, hemoptysis, sputum production, shortness of breath, wheezing and stridor.    Cardiovascular: Negative for chest pain, palpitations, orthopnea, claudication, leg swelling and PND.   Gastrointestinal: Negative for , nausea, vomiting, abdominal pain, diarrhea, constipation, blood in stool and melena. Heartburn worse with anxiety and bending over   Genitourinary: Negative for dysuria, urgency, frequency, hematuria and flank pain.   Musculoskeletal: Negative for myalgias, back pain, joint  "pain and falls.   Skin: Negative for itching and rash.   Neurological: Negative for dizziness, tingling, tremors, sensory change, speech change, focal weakness, seizures, loss of consciousness, weakness and headaches.   Endo/Heme/Allergies: Negative for environmental allergies   Psychiatric/Behavioral: Negative for depression,   Physical Exam:      Constitutional: Appears well-developed, well-nourished and in no acute distress.  Patient is oriented to person, place, and time.   Head: Normocephalic and atraumatic. Mucous membranes moist.  Neck: Neck supple no mass.   Cardiovascular: Normal rate and regular rhythm.  S1 S2 appreciated by ascultation.  Pulmonary/Chest: Effort normal and clear to auscultation bilaterally. No respiratory distress.   Abdomen: Soft. Non-tender and non-distended. Bowel sounds are normal.   Neurological: Patient is alert and oriented to person, place and time. Moves all extremities.  Skin: Warm and dry. No lesions.  Extremities: No clubbing, cyanosis or edema.    Laboratory data:      Reviewed and noted in plan where applicable. Please see chart for full laboratory data.    @BNXOPTFCZ29(cpk,cpkmb,troponini,mb)@ @TBEYWDSAP22(poctglucose)@     No results found for: "INR", "PROTIME"    Lab Results   Component Value Date    WBC 5.08 09/05/2023    HGB 13.5 09/05/2023    HCT 43.6 09/05/2023    MCV 92 09/05/2023     09/05/2023       @HQCLQXJWY78(GLU,NA,K,Cl,CO2,BUN,Creatinine,Calcium,MG)@    Predictors of intubation difficulty:       Morbid obesity? no   Anatomically abnormal facies? no   Prominent incisors? no   Receding mandible? no   Short, thick neck? no   Neck range of motion: normal   Dentition:  intact   Based on the Modified Mallampati, patient is a mallampati score: II (hard and soft palate, upper portion of tonsils anduvula visible)    Cardiographics:      ECG:  3/19/24 NSR   Echocardiogram:  none     Imaging:      Chest x-ray:  none      Assessment and Plan:      Pre-op " exam  Patient presents at the request of Dr. Bhandari who palns on performing a L lumpectomy with bx on 3/20/24   Known risk factors for perioperative complications:  breast cancer, adipex use     Difficulty with intubation is anticipated.  Airway history, patient does have TMJ.     Cardiac Risk Estimation:  Per Revised Cardiac Risk Index patient is a Class I  risk with a 3.9% risk of a major cardiac event.      1.) Preoperative workup as follows: ECG, hemoglobin, hematocrit, electrolytes, creatinine, glucose, liver function studies.  2.) Change in medication regimen before surgery:  Adipex last dose was Sunday.  Patient reports using the medication 5-6 times over the last 2 weeks.  Discussed use with anesthesiologist, we will monitor patient for blood pressure variations throughout the procedure.  Also discussed risk for blood pressure variations with the patient .  3.) Prophylaxis for cardiac events with perioperative beta-blockers: not indicated.  4.) Invasive hemodynamic monitoring perioperatively: not indicated.  5.) Deep vein thrombosis prophylaxis postoperatively: intermittent pneumatic compression boots and regimen to be chosen by surgical team.  6.) Surveillance for postoperative MI with ECG immediately postoperatively and on postoperati ve days 1 and 2 AND troponin levels 24 hours postoperatively and on day 4 or hospital discharge (whichever comes first): not indicated.  7.) Current medications which may produce withdrawal symptoms if withheld perioperatively: none  8.) Other measures:  Elevated today, on recheck 141/72.  On review of prior blood pressure readings patient has been normotensive.  Ask patient to obtain cough and monitor pressures at home if consistently greater than 140/90 discuss with primary care    Malignant neoplasm of upper-outer quadrant of left breast in female, estrogen receptor positive  Patient plans for surgical intervention with  L lumpectomy and biopsy on March 20th      TMJ  (temporomandibular joint syndrome)  Pain on R > L   Will dislocate on rare occasion

## 2024-03-19 NOTE — H&P (VIEW-ONLY)
Breast Surgical Oncology  [place]    Date of Service: 2024    SUBJECTIVE:   Chief complaint: left breast cancer    HISTORY OF PRESENT ILLNESS:   Joyce Velarde is a 64 y.o. female who is kindly referred by Dr. Jazmine Daniel for left breast cancer.    24  A left breast abnormality was identified on routine screening mammography.  Focused sonographic evaluation revealed a 0.8 mm x 0.6 mm x 0.6 mm oval, hypoechoic mass with spiculated margins with shadowing seen in the upper outer quadrant of the left breast at 1 o'clock in the middle depth, 5 cm from the nipple. The mass correlates with the prior mammogram finding.  Additionally, there is abnormal appearing lymph node in the left axilla measuring 0.7 x 0.7 x 0.6 cm demonstrating eccentric cortical thickening measuring 4 mm in thickness. Core needle biopsy of the breast mass and lymph node confirmed hormone receptor positive, [id number] negative invasive ductal carcinoma. She denies breast concerns such as pain, masses, skin changes, nipple discharge, nipple retraction or lumps under the arm.  She denies prior breast surgery or biopsy.     3/19/24  Genetic testing is negative for a germline mutation. Staging scan are negative for distant metastatic disease. She is here for surgical planning and localization is scheduled for today.     Her breast cancer risk factor profile is as follows: Menarche at 15, Menopause at 50.  She is . Age at first live birth was 25. Family history of cancer is as follows: mother breast cancer at 85 inflammatory breast cancer, sister breast cancer at late 40's- HER 2 positive- had genetic testing and negative for mutation     FAMILY HISTORY:     Family History   Problem Relation Age of Onset    Breast cancer Mother 84        inflammatory    Hypertension Mother     Dementia Mother     Hyperlipidemia Mother     Breast cancer Sister 43        genetic testing negative (~)    Lung cancer Maternal Grandmother          +smoking hx    Heart disease Maternal Grandmother     Heart disease Maternal Grandfather     Breast cancer Other     Breast cancer Maternal Cousin 53        genetic testing negative (2015)    Breast cancer Paternal Cousin         PAST MEDICAL HISTORY:   No past medical history on file.    SURGICAL HISTORY:     Past Surgical History:   Procedure Laterality Date    BILATERAL TUBAL LIGATION       SECTION      X 2       SOCIAL HISTORY:     Social History     Tobacco Use    Smoking status: Never    Smokeless tobacco: Never   Substance Use Topics    Alcohol use: Yes    Drug use: Never        MEDICATIONS/ALLERGIES:     Current Outpatient Medications:     phentermine (ADIPEX-P) 37.5 mg tablet, Take 37.5 mg by mouth before breakfast., Disp: , Rfl:   Review of patient's allergies indicates:  No Known Allergies    REVIEW OF SYSTEMS:   I have reviewed 12 systems, including 2 points per system. Pertinent reported positives are: none    PHYSICAL EXAM:   General: The patient appears well and is in no acute distress.     Corinna Davenport MA was present as a chaperone for the examination.   BREAST EXAM  No Asymmetry  Right:  - Mass: No  - Skin change: No  - Nipple Discharge: No  - Nipple retraction: No  - Axillary LAD: No  Left:   - Mass: No  - Skin change: No  - Nipple Discharge: No  - Nipple retraction: No  - Axillary LAD: No    IMAGING:   Images were personally reviewed.   Results for orders placed in visit on 24    US Breast Left Limited    Narrative  Result:  US Breast Left Limited    History:  Patient is 64 y.o. and is seen for diagnostic imaging.    Films Compared:  Compared to: 02/15/2024 Mammo Digital Screening Bilat w/ Trevor and 2022 Mammo Digital Screening Bilat w/ Trevor    Findings:    There is a 0.8 mm x 0.6 mm x 0.6 mm oval, hypoechoic mass with spiculated margins with shadowing seen in the upper outer quadrant of the left breast at 1 o'clock in the middle depth, 5 cm from the nipple. The mass  correlates with the prior mammogram finding.  Additionally, there is abnormal appearing lymph node in the left axilla measuring 0.7 x 0.7 x 0.6 cm demonstrating eccentric cortical thickening measuring 4 mm in thickness    Impression  Left  Mass: Left breast 0.8 mm x 0.6 mm x 0.6 mm mass at the upper outer middle 1 o'clock position.  Suspicious left axillary lymph node.  Assessment: 4 - Suspicious finding.  Ultrasound-guided biopsy is recommended for both of these findings.  Results were discussed with the patient and the ordering physician's office was notified.    BI-RADS Category:  Overall: 4 - Suspicious      Recommendation:  Biopsy is recommended.    PATHOLOGY:     Lab Results   Component Value Date    FPATHDX  02/21/2024     Part 1   Breast, left, 1 o'clock position, ultrasound-guided biopsy:  Invasive mammary carcinoma, moderately differentiated  [place] grade 2:  Tubule formation-3, nuclear pleomorphism-2 and mitotic count -1   Invasive carcinoma measures 7 mm in greatest dimension   No carcinoma in-situ or lymphovascular invasion is identified  All submitted cores are involved by invasive carcinoma      Part 2   Axilla, left, ultrasound-guided biopsy:   Positive for metastatic carcinoma  Metastatic focus measures 4.5 mm in greatest dimension  Extranodal extension not definitively identified    ** tumor biomarkers and additional stains will be performed with results provided in a supplemental report.    This case was reviewed by Dr. RODNEY Hankins who concurs with the above diagnoses.         ASSESSMENT:     1. Malignant neoplasm of upper-outer quadrant of left breast in female, estrogen receptor positive          PLAN:     Joyce Velarde is a 64 y.o. female who is new diagnosis of Stage IB (T1b N1 Mx) LEFT Breast Invasive Mammary Carcinoma, Grade 2, ER 90-95%, PA 20-30%, Cjs6zyd 0 with a ki67 of 40%.    We again have discussed the surgical choices of lumpectomy with radiation and mastectomy with or  without radiation.  I have explained that the survival is the same, regardless of the surgery chosen.  We have discussed that the local recurrence rate following mastectomy is 2-5%.  I have explained that the local recurrence rate was slightly higher following breast conservation in the large trials that compared mastectomy and breast conservation. However, with current medical therapies, local recurrence has been reduced to as low as 6%. I did review with her the general schedule and side effects of radiation.    We reviewed the need for a targeted sentinel lymph node biopsy to further stage the axilla because her axillary disease is not clinically appreciable on examination.     After careful consideration of her options, she has elected for breast conserving surgery. She will be scheduled for a left partial mastectomy with targeted axillary lymph node dissection. GEETA localization is performed today for the breast and positive lymph node. The risks of surgery were discussed with the patient, including pain, bleeding, infections, scarring, numbness, lymphedema, injury to adjacent structures such as nerves that affect movement of the arm, need for additional surgery for margins or lymph nodes, need for additional treatments and recurrence.  She has provided informed consent.      I spent a total of 30 minutes on this visit. This includes face to face time and non-face to face time preparing to see the patient (eg, review of tests), obtaining and/or reviewing separately obtained history, documenting clinical information in the electronic or other health record, independently interpreting results and communicating results to the patient/family/caregiver, or care coordinator.      Lynda Bhandari M.D.

## 2024-03-20 ENCOUNTER — HOSPITAL ENCOUNTER (OUTPATIENT)
Facility: HOSPITAL | Age: 64
Discharge: HOME | End: 2024-03-20
Attending: SURGERY | Admitting: SURGERY
Payer: COMMERCIAL

## 2024-03-20 ENCOUNTER — HOSPITAL ENCOUNTER (OUTPATIENT)
Dept: RADIOLOGY | Facility: HOSPITAL | Age: 64
Discharge: HOME | End: 2024-03-20
Attending: SURGERY | Admitting: SURGERY
Payer: COMMERCIAL

## 2024-03-20 ENCOUNTER — ANESTHESIA (OUTPATIENT)
Dept: SURGERY | Facility: HOSPITAL | Age: 64
End: 2024-03-20
Payer: COMMERCIAL

## 2024-03-20 VITALS
HEART RATE: 78 BPM | TEMPERATURE: 98 F | BODY MASS INDEX: 26.76 KG/M2 | DIASTOLIC BLOOD PRESSURE: 78 MMHG | HEIGHT: 64 IN | SYSTOLIC BLOOD PRESSURE: 131 MMHG | RESPIRATION RATE: 16 BRPM | OXYGEN SATURATION: 98 % | WEIGHT: 156.75 LBS

## 2024-03-20 DIAGNOSIS — C50.412 MALIGNANT NEOPLASM OF UPPER-OUTER QUADRANT OF LEFT BREAST, ESTROGEN RECEPTOR POSITIVE: ICD-10-CM

## 2024-03-20 DIAGNOSIS — Z17.0 MALIGNANT NEOPLASM OF UPPER-OUTER QUADRANT OF LEFT BREAST IN FEMALE, ESTROGEN RECEPTOR POSITIVE: ICD-10-CM

## 2024-03-20 DIAGNOSIS — Z17.0 MALIGNANT NEOPLASM OF UPPER-OUTER QUADRANT OF LEFT BREAST, ESTROGEN RECEPTOR POSITIVE: ICD-10-CM

## 2024-03-20 DIAGNOSIS — C50.412 MALIGNANT NEOPLASM OF UPPER-OUTER QUADRANT OF LEFT BREAST IN FEMALE, ESTROGEN RECEPTOR POSITIVE: ICD-10-CM

## 2024-03-20 PROCEDURE — 63600175 PHARM REV CODE 636 W HCPCS: Performed by: ANESTHESIOLOGY

## 2024-03-20 PROCEDURE — 76098 X-RAY EXAM SURGICAL SPECIMEN: CPT | Mod: TC

## 2024-03-20 PROCEDURE — 19301 PARTIAL MASTECTOMY: CPT | Mod: LT,,, | Performed by: SURGERY

## 2024-03-20 PROCEDURE — D9220A PRA ANESTHESIA: Mod: ,,, | Performed by: NURSE ANESTHETIST, CERTIFIED REGISTERED

## 2024-03-20 PROCEDURE — 37000008 HC ANESTHESIA 1ST 15 MINUTES: Performed by: SURGERY

## 2024-03-20 PROCEDURE — 88341 IMHCHEM/IMCYTCHM EA ADD ANTB: CPT | Mod: 26,59,, | Performed by: PATHOLOGY

## 2024-03-20 PROCEDURE — 88305 TISSUE EXAM BY PATHOLOGIST: CPT | Mod: 26,,, | Performed by: PATHOLOGY

## 2024-03-20 PROCEDURE — 25000003 PHARM REV CODE 250: Performed by: SURGERY

## 2024-03-20 PROCEDURE — 25000003 PHARM REV CODE 250: Performed by: ANESTHESIOLOGY

## 2024-03-20 PROCEDURE — 63600175 PHARM REV CODE 636 W HCPCS: Performed by: SURGERY

## 2024-03-20 PROCEDURE — 38792 RA TRACER ID OF SENTINL NODE: CPT | Mod: TC

## 2024-03-20 PROCEDURE — 88342 IMHCHEM/IMCYTCHM 1ST ANTB: CPT | Mod: 26,59,, | Performed by: PATHOLOGY

## 2024-03-20 PROCEDURE — 76098 X-RAY EXAM SURGICAL SPECIMEN: CPT | Mod: 26,,, | Performed by: SURGERY

## 2024-03-20 PROCEDURE — 25000003 PHARM REV CODE 250: Performed by: NURSE ANESTHETIST, CERTIFIED REGISTERED

## 2024-03-20 PROCEDURE — C1819 TISSUE LOCALIZATION-EXCISION: HCPCS | Performed by: SURGERY

## 2024-03-20 PROCEDURE — 88360 TUMOR IMMUNOHISTOCHEM/MANUAL: CPT | Mod: 26,,, | Performed by: PATHOLOGY

## 2024-03-20 PROCEDURE — 88341 IMHCHEM/IMCYTCHM EA ADD ANTB: CPT | Performed by: PATHOLOGY

## 2024-03-20 PROCEDURE — 63600175 PHARM REV CODE 636 W HCPCS: Mod: JZ,JG | Performed by: SURGERY

## 2024-03-20 PROCEDURE — 36000707: Performed by: SURGERY

## 2024-03-20 PROCEDURE — A9520 TC99 TILMANOCEPT DIAG 0.5MCI: HCPCS | Performed by: SURGERY

## 2024-03-20 PROCEDURE — 36000706: Performed by: SURGERY

## 2024-03-20 PROCEDURE — 88307 TISSUE EXAM BY PATHOLOGIST: CPT | Mod: 26,,, | Performed by: PATHOLOGY

## 2024-03-20 PROCEDURE — 88307 TISSUE EXAM BY PATHOLOGIST: CPT | Performed by: PATHOLOGY

## 2024-03-20 PROCEDURE — 27201423 OPTIME MED/SURG SUP & DEVICES STERILE SUPPLY: Performed by: SURGERY

## 2024-03-20 PROCEDURE — 71000033 HC RECOVERY, INTIAL HOUR: Performed by: SURGERY

## 2024-03-20 PROCEDURE — 63600175 PHARM REV CODE 636 W HCPCS: Performed by: NURSE ANESTHETIST, CERTIFIED REGISTERED

## 2024-03-20 PROCEDURE — 71000015 HC POSTOP RECOV 1ST HR: Performed by: SURGERY

## 2024-03-20 PROCEDURE — A4216 STERILE WATER/SALINE, 10 ML: HCPCS | Performed by: SURGERY

## 2024-03-20 PROCEDURE — 38525 BIOPSY/REMOVAL LYMPH NODES: CPT | Mod: 51,LT,, | Performed by: SURGERY

## 2024-03-20 PROCEDURE — 38792 RA TRACER ID OF SENTINL NODE: CPT | Mod: 59,LT,, | Performed by: RADIOLOGY

## 2024-03-20 PROCEDURE — 88305 TISSUE EXAM BY PATHOLOGIST: CPT | Performed by: PATHOLOGY

## 2024-03-20 PROCEDURE — 37000009 HC ANESTHESIA EA ADD 15 MINS: Performed by: SURGERY

## 2024-03-20 PROCEDURE — 88342 IMHCHEM/IMCYTCHM 1ST ANTB: CPT | Performed by: PATHOLOGY

## 2024-03-20 PROCEDURE — 38900 IO MAP OF SENT LYMPH NODE: CPT | Mod: LT,,, | Performed by: SURGERY

## 2024-03-20 RX ORDER — OXYCODONE AND ACETAMINOPHEN 5; 325 MG/1; MG/1
1 TABLET ORAL
Status: DISCONTINUED | OUTPATIENT
Start: 2024-03-20 | End: 2024-03-20 | Stop reason: HOSPADM

## 2024-03-20 RX ORDER — DEXMEDETOMIDINE HYDROCHLORIDE 100 UG/ML
INJECTION, SOLUTION INTRAVENOUS
Status: DISCONTINUED | OUTPATIENT
Start: 2024-03-20 | End: 2024-03-20

## 2024-03-20 RX ORDER — MEPERIDINE HYDROCHLORIDE 25 MG/ML
12.5 INJECTION INTRAMUSCULAR; INTRAVENOUS; SUBCUTANEOUS ONCE AS NEEDED
Status: DISCONTINUED | OUTPATIENT
Start: 2024-03-20 | End: 2024-03-20 | Stop reason: HOSPADM

## 2024-03-20 RX ORDER — ONDANSETRON HYDROCHLORIDE 2 MG/ML
4 INJECTION, SOLUTION INTRAVENOUS DAILY PRN
Status: DISCONTINUED | OUTPATIENT
Start: 2024-03-20 | End: 2024-03-20 | Stop reason: HOSPADM

## 2024-03-20 RX ORDER — ACETAMINOPHEN 10 MG/ML
INJECTION, SOLUTION INTRAVENOUS
Status: DISCONTINUED | OUTPATIENT
Start: 2024-03-20 | End: 2024-03-20

## 2024-03-20 RX ORDER — BUPIVACAINE HYDROCHLORIDE 2.5 MG/ML
INJECTION, SOLUTION EPIDURAL; INFILTRATION; INTRACAUDAL
Status: DISCONTINUED | OUTPATIENT
Start: 2024-03-20 | End: 2024-03-20 | Stop reason: HOSPADM

## 2024-03-20 RX ORDER — SODIUM CHLORIDE 9 MG/ML
INJECTION, SOLUTION INTRAVENOUS CONTINUOUS
Status: DISCONTINUED | OUTPATIENT
Start: 2024-03-20 | End: 2024-03-20 | Stop reason: HOSPADM

## 2024-03-20 RX ORDER — EPHEDRINE SULFATE 50 MG/ML
INJECTION, SOLUTION INTRAVENOUS
Status: DISCONTINUED | OUTPATIENT
Start: 2024-03-20 | End: 2024-03-20

## 2024-03-20 RX ORDER — DIPHENHYDRAMINE HYDROCHLORIDE 50 MG/ML
INJECTION INTRAMUSCULAR; INTRAVENOUS
Status: DISCONTINUED | OUTPATIENT
Start: 2024-03-20 | End: 2024-03-20

## 2024-03-20 RX ORDER — LIDOCAINE HYDROCHLORIDE 20 MG/ML
INJECTION, SOLUTION EPIDURAL; INFILTRATION; INTRACAUDAL; PERINEURAL
Status: DISCONTINUED | OUTPATIENT
Start: 2024-03-20 | End: 2024-03-20

## 2024-03-20 RX ORDER — FENTANYL CITRATE 50 UG/ML
25 INJECTION, SOLUTION INTRAMUSCULAR; INTRAVENOUS EVERY 5 MIN PRN
Status: DISCONTINUED | OUTPATIENT
Start: 2024-03-20 | End: 2024-03-20 | Stop reason: HOSPADM

## 2024-03-20 RX ORDER — ONDANSETRON 8 MG/1
8 TABLET, ORALLY DISINTEGRATING ORAL EVERY 8 HOURS PRN
Qty: 12 TABLET | Refills: 2 | Status: SHIPPED | OUTPATIENT
Start: 2024-03-20 | End: 2024-05-14 | Stop reason: ALTCHOICE

## 2024-03-20 RX ORDER — DEXAMETHASONE SODIUM PHOSPHATE 4 MG/ML
INJECTION, SOLUTION INTRA-ARTICULAR; INTRALESIONAL; INTRAMUSCULAR; INTRAVENOUS; SOFT TISSUE
Status: DISCONTINUED | OUTPATIENT
Start: 2024-03-20 | End: 2024-03-20

## 2024-03-20 RX ORDER — PROPOFOL 10 MG/ML
VIAL (ML) INTRAVENOUS
Status: DISCONTINUED | OUTPATIENT
Start: 2024-03-20 | End: 2024-03-20

## 2024-03-20 RX ORDER — ISOSULFAN BLUE 50 MG/5ML
INJECTION, SOLUTION SUBCUTANEOUS
Status: DISCONTINUED
Start: 2024-03-20 | End: 2024-03-20 | Stop reason: HOSPADM

## 2024-03-20 RX ORDER — FENTANYL CITRATE 50 UG/ML
INJECTION, SOLUTION INTRAMUSCULAR; INTRAVENOUS
Status: DISCONTINUED | OUTPATIENT
Start: 2024-03-20 | End: 2024-03-20

## 2024-03-20 RX ORDER — BUPIVACAINE HYDROCHLORIDE 2.5 MG/ML
INJECTION, SOLUTION EPIDURAL; INFILTRATION; INTRACAUDAL
Status: DISCONTINUED
Start: 2024-03-20 | End: 2024-03-20 | Stop reason: HOSPADM

## 2024-03-20 RX ORDER — SODIUM CHLORIDE, SODIUM LACTATE, POTASSIUM CHLORIDE, CALCIUM CHLORIDE 600; 310; 30; 20 MG/100ML; MG/100ML; MG/100ML; MG/100ML
INJECTION, SOLUTION INTRAVENOUS CONTINUOUS
Status: DISCONTINUED | OUTPATIENT
Start: 2024-03-20 | End: 2024-03-20 | Stop reason: HOSPADM

## 2024-03-20 RX ORDER — TRAMADOL HYDROCHLORIDE 50 MG/1
50 TABLET ORAL EVERY 6 HOURS PRN
Qty: 15 TABLET | Refills: 0 | Status: SHIPPED | OUTPATIENT
Start: 2024-03-20

## 2024-03-20 RX ORDER — SODIUM CHLORIDE 9 MG/ML
INJECTION, SOLUTION INTRAMUSCULAR; INTRAVENOUS; SUBCUTANEOUS
Status: DISCONTINUED | OUTPATIENT
Start: 2024-03-20 | End: 2024-03-20 | Stop reason: HOSPADM

## 2024-03-20 RX ORDER — MIDAZOLAM HYDROCHLORIDE 1 MG/ML
INJECTION INTRAMUSCULAR; INTRAVENOUS
Status: DISCONTINUED | OUTPATIENT
Start: 2024-03-20 | End: 2024-03-20

## 2024-03-20 RX ORDER — ONDANSETRON HYDROCHLORIDE 2 MG/ML
INJECTION, SOLUTION INTRAVENOUS
Status: DISCONTINUED | OUTPATIENT
Start: 2024-03-20 | End: 2024-03-20

## 2024-03-20 RX ORDER — ISOSULFAN BLUE 50 MG/5ML
INJECTION, SOLUTION SUBCUTANEOUS
Status: DISCONTINUED | OUTPATIENT
Start: 2024-03-20 | End: 2024-03-20 | Stop reason: HOSPADM

## 2024-03-20 RX ADMIN — FENTANYL CITRATE 50 MCG: 50 INJECTION, SOLUTION INTRAMUSCULAR; INTRAVENOUS at 08:03

## 2024-03-20 RX ADMIN — SODIUM CHLORIDE, SODIUM LACTATE, POTASSIUM CHLORIDE, AND CALCIUM CHLORIDE: 600; 310; 30; 20 INJECTION, SOLUTION INTRAVENOUS at 07:03

## 2024-03-20 RX ADMIN — EPHEDRINE SULFATE 5 MG: 50 INJECTION INTRAVENOUS at 09:03

## 2024-03-20 RX ADMIN — PROPOFOL 30 MG: 10 INJECTION, EMULSION INTRAVENOUS at 08:03

## 2024-03-20 RX ADMIN — ACETAMINOPHEN 1000 MG: 10 INJECTION, SOLUTION INTRAVENOUS at 08:03

## 2024-03-20 RX ADMIN — FENTANYL CITRATE 25 MCG: 50 INJECTION, SOLUTION INTRAMUSCULAR; INTRAVENOUS at 08:03

## 2024-03-20 RX ADMIN — LIDOCAINE HYDROCHLORIDE 80 MG: 20 INJECTION, SOLUTION EPIDURAL; INFILTRATION; INTRACAUDAL; PERINEURAL at 07:03

## 2024-03-20 RX ADMIN — ONDANSETRON 4 MG: 2 INJECTION INTRAMUSCULAR; INTRAVENOUS at 08:03

## 2024-03-20 RX ADMIN — MIDAZOLAM HYDROCHLORIDE 2 MG: 1 INJECTION INTRAMUSCULAR; INTRAVENOUS at 07:03

## 2024-03-20 RX ADMIN — PROPOFOL 160 MG: 10 INJECTION, EMULSION INTRAVENOUS at 07:03

## 2024-03-20 RX ADMIN — OXYCODONE HYDROCHLORIDE AND ACETAMINOPHEN 1 TABLET: 5; 325 TABLET ORAL at 10:03

## 2024-03-20 RX ADMIN — DIPHENHYDRAMINE HYDROCHLORIDE 12.5 MG: 50 INJECTION INTRAMUSCULAR; INTRAVENOUS at 08:03

## 2024-03-20 RX ADMIN — EPHEDRINE SULFATE 5 MG: 50 INJECTION INTRAVENOUS at 08:03

## 2024-03-20 RX ADMIN — DEXMEDETOMIDINE HYDROCHLORIDE 8 MCG: 100 INJECTION, SOLUTION INTRAVENOUS at 09:03

## 2024-03-20 RX ADMIN — CEFAZOLIN 2 G: 2 INJECTION, POWDER, FOR SOLUTION INTRAMUSCULAR; INTRAVENOUS at 08:03

## 2024-03-20 RX ADMIN — TILMANOCEPT 505 MICROCURIE: KIT at 07:03

## 2024-03-20 RX ADMIN — DEXAMETHASONE SODIUM PHOSPHATE 8 MG: 4 INJECTION, SOLUTION INTRA-ARTICULAR; INTRALESIONAL; INTRAMUSCULAR; INTRAVENOUS; SOFT TISSUE at 08:03

## 2024-03-20 RX ADMIN — PROPOFOL 40 MG: 10 INJECTION, EMULSION INTRAVENOUS at 08:03

## 2024-03-20 RX ADMIN — EPHEDRINE SULFATE 10 MG: 50 INJECTION INTRAVENOUS at 08:03

## 2024-03-20 NOTE — ANESTHESIA POSTPROCEDURE EVALUATION
Anesthesia Post Evaluation    Patient: Joyce Velarde    Procedure(s) Performed: Procedure(s) (LRB):  LUMPECTOMY,WITH RADAR LOCALIZATION USING GEETA  (Left)  MAPPING, LYMPH NODE, SENTINEL (Left)  BIOPSY, LYMPH NODE, SENTINEL (Left)    Final Anesthesia Type: general      Patient location during evaluation: PACU  Patient participation: Yes- Able to Participate  Level of consciousness: awake and alert and oriented  Post-procedure vital signs: reviewed and stable  Pain management: adequate  Airway patency: patent    PONV status at discharge: No PONV  Anesthetic complications: no      Cardiovascular status: blood pressure returned to baseline, stable and hemodynamically stable  Respiratory status: unassisted  Hydration status: euvolemic  Follow-up not needed.              Vitals Value Taken Time   /76 03/20/24 1003   Temp 36.4 °C (97.5 °F) 03/20/24 0935   Pulse 76 03/20/24 1002   Resp 25 03/20/24 1002   SpO2 96 % 03/20/24 1002   Vitals shown include unvalidated device data.      No case tracking events are documented in the log.      Pain/Lexi Score: Lexi Score: 9 (3/20/2024  9:45 AM)

## 2024-03-20 NOTE — DISCHARGE INSTRUCTIONS
POSTOPERATIVE INSTRUCTIONS FOLLOWING BIOPSY OR LUMPECTOMY     The following are post-operative instructions that will help you to recover from your surgery.  Please read over these instructions carefully and contact us if we can answer any of your questions or concerns.     Dressing/breast binder (surgi-bra)  A surgical bra may be placed around your chest after your surgery.  If you are given the bra, please wear it as close to 24 hours a day as possible until your post-operative clinic appointment.  If the elastic around the bra irritates your skin, you may wear a soft t-shirt underneath the bra.  You may go without wearing the bra long enough to bath, to launder and dry the bra.  If the bra is extremely uncomfortable, you may wear a supportive sports bra instead after 2 days.  You may shower after 2 days.  Do not take a tub bath and do not soak the surgical site. lease do not remove the surgical glue that covers your incision.  This will peel off in 2-3 weeks naturally.      Activity   You should be able to return to your regular activities 2 days after your surgery.  However, do not engage in strenuous activities in which you use your upper body such as:  golf, tennis, aerobics, washing windows, raking the yard, mopping, vacuuming, heavy lifting (e.g children) until you are seen for your follow-up appointment in clinic.     Medication for pain  You may find that over the counter pain medications may be sufficient for your pain.  You will be given a prescription for pain medication for more severe pain.  You should not drive or operate machinery while taking these.  Please take narcotics with food.  Narcotics can cause, or worse, constipation.  You will need to increase your fluid intake, eat high fiber foods (such as fruits and bran) and make sure that you are up and walking. You may need to take an over the counter stool softener for constipation.     After surgery at home  Ultram will be prescribed to take  every 6 hr as needed for breakthrough pain  2.  Wear compressive bra at all times for 2 weeks after surgery.    3.  May apply ice on the breast to help with decreasing pain  4.  Keep wound dry 48 hours after the operation. After this time, you are able to shower. No soaking in baths for 1 week. After this time, you are free to shower or bathe.   5.  No driving if you are taking prescribed Ultram. You can get a DUI on these medications.   6.  Avoid touching the wound or surrounding area as much as possible until your postoperative visit.             Please report the following:  Temperature greater than 101 degrees  Discharge or bad odor from the wound  Excessive bleeding, such as bloody dressing or extreme bruising  Redness at incision and/or drain sites  Swelling or buildup of fluid around incision    Additional information  I will see you approximately 2 weeks following your surgery.  If this follow-up appointment has not been made, please call the office.     If you have any questions or problems, please call my office or my nurse.     Dr. Lynda Bhandari     [phone]

## 2024-03-20 NOTE — INTERVAL H&P NOTE
The patient has been examined and the H&P has been reviewed:    I concur with the findings and no changes have occurred since H&P was written.    Surgery risks, benefits and alternative options discussed and understood by patient/family.    Active Hospital Problems    Diagnosis  POA    *Malignant neoplasm of upper-outer quadrant of left breast in female, estrogen receptor positive [C50.412, Z17.0]  Not Applicable      Resolved Hospital Problems   No resolved problems to display.

## 2024-03-20 NOTE — DISCHARGE SUMMARY
[facility]  Discharge Note  Short Stay    Procedure(s) (LRB):  LUMPECTOMY,WITH RADAR LOCALIZATION USING GEETA  (Left)  MAPPING, LYMPH NODE, SENTINEL (Left)  BIOPSY, LYMPH NODE, SENTINEL (Left)      OUTCOME: Patient tolerated treatment/procedure well without complication and is now ready for discharge.    DISPOSITION: Home or Self Care    FINAL DIAGNOSIS:  Malignant neoplasm of upper-outer quadrant of left breast in female, estrogen receptor positive    FOLLOWUP: In clinic    DISCHARGE INSTRUCTIONS:    Discharge Procedure Orders   Diet general     Lifting restrictions   Order Comments: No lifting over 20 pounds     Call MD for:  temperature >100.4     Call MD for:  persistent nausea and vomiting     Call MD for:  severe uncontrolled pain     Call MD for:  difficulty breathing, headache or visual disturbances        TIME SPENT ON DISCHARGE: 15 minutes

## 2024-03-20 NOTE — TRANSFER OF CARE
"Anesthesia Transfer of Care Note    Patient: Joyce Velarde    Procedure(s) Performed: Procedure(s) (LRB):  LUMPECTOMY,WITH RADAR LOCALIZATION USING GEETA  (Left)  MAPPING, LYMPH NODE, SENTINEL (Left)  BIOPSY, LYMPH NODE, SENTINEL (Left)    Patient location: PACU    Anesthesia Type: general    Transport from OR: Transported from OR on room air with adequate spontaneous ventilation    Post pain: adequate analgesia    Post assessment: no apparent anesthetic complications and tolerated procedure well    Post vital signs: stable    Level of consciousness: sedated    Nausea/Vomiting: no nausea/vomiting    Complications: none    Transfer of care protocol was followed      Last vitals: Visit Vitals  BP (!) 154/79   Pulse 74   Temp 36.3 °C (97.3 °F)   Resp 16   Ht 5' 4" (1.626 m)   Wt 71.1 kg (156 lb 12 oz)   SpO2 99%   Breastfeeding No   BMI 26.91 kg/m²     "

## 2024-03-20 NOTE — OP NOTE
Operative Report    03/20/2024    Preoperative Diagnosis:   Left Breast Cancer, upper outer quadrant quadrant  Breast Cancer Metastasis to Left Axillary Lymph Node    Postoperative Diagnosis:  Same    Procedures Performed:  Left Lumpectomy Using Preoperatively Placed Radiographic Marker (Rere )  Left Targeted Axillary Lymph Node Dissection  Injection of Isosulfan Blue Dye  Interpretation of Specimen Mammogram     Surgeon: Lynda Bhandari MD    Anesthesia: General    Drains: None    Estimated Blood Loss: Minimal    Complications: None apparent    Disposition: PACU in good condition    Specimens:  Left Breast, Short Suture Superior, Single Long Suture Lateral, Double Long Suture Deep  Left Lumpectomy Additional Medial Margin, Stitch on New Margin  Left Lumpectomy, Additional Inferior Margin, Stitch on New Margin  Left  Axillary [place] Lymph Nodes              No. 1 Hot, Blue Clipped Node (Count 127)   No. 2 Hot, Blue (Count 113)   No. 3 Hot, Blue (Count 815)   Nonsentinel Lymph Node No.1             Background Count 2    Statement of Medical Necessity:  This patient is a 64 year old woman who was recently diagnosed with left breast cancer.  After undergoing a thorough preoperative evaluation and considering all of her options, she has elected for breast conservation.  The risks, benefits and alternatives to surgery have been discussed and she has provided informed consent.     Description of Operative Procedures and Findings:  The patient was identified and transported to the operating room and placed on the operating table.  All pressure points were padded.  General Anesthesia was administered.  After the skin was cleansed with an alcohol prep, blue dye was injected beneath the nipple-areolar complex and the breast was massaged for 5 minutes.  The patient's left breast and axilla was prepped and draped in the usual sterile fashion.  A time out was performed.    Attention was then turned to the left breast.  A  periareolar incision was made using the 15 blade scalpel.  A flap anterior to the target was raised using the bovie.  A figure of eight suture was placed at the point of maximal signal and this was used as a handle.  Using a combination of the Rere probe and skin markings made under ultrasound guidance, the target was circumferentially dissected.  Once the reflector was confirmed to be within the mobilized tissue, the posterior margin was transected along her pectoralis muscle, allowing for removal of the specimen.  The specimen was oriented using a short suture superiorly, a single long suture laterally, and double long suture deep.  A specimen mammogram confirmed inclusion of the malignancy and the biopsy clip.  I interpreted this image myself. Based on the proximity of the mass within the specimen. Additional inferior and medial margins were resected with cautery, suture oriented as above, and sent for pathology.      Attention was turned to the left axilla.  An axillary incision was made at the base of the hair-bearing area, at the point of maximal uptake using the gamma probe. The dissection was continued through the subcutaneous tissues using the bovie.  The clavipectoral fascia was opened, exposing the classically appearing axillary fat.  3 sentinel lymph nodes were identified, one being the clipped node, as described above. 1 palpable non-sentinel lymph node was identified and resected.  The final post-excision count was 2, < 10% of the highest my count.  These were submitted to pathology for permanent evaluation.      The wounds were irrigated, suctioned dry and hemostasis was obtained. Local anesthetic was injected about the wounds.  The lumpectomy cavity was marked using clips.  The breast tissue was re-approximated using interrupted 3-0 vicryl sutures.  The clavipectoral fascia was closed using interrupted 3-0 vicryl sutures.     The instrument, needle and sponge counts were reported to be correct.   Both skin incisions were closed in 3 layers using numerous 3-0 vicryl interrupted deep parenchymal sutures, 4-0 vicryl running deep dermal suture, and running 4-0 monocryl subcuticular suture.  The incisions were dressed with surgical glue dressings, fluffs and a surgical bra. The patient was awoken from anesthesia and transported to the PACU in good condition.  No complications were noted. I was present for and performed the entire operation.       [place] Node Biopsy for Breast Cancer - Left  Operation performed with curative intent Yes   Tracer(s) used to identify sentinel nodes in the upfront surgery (non-neoadjuvant) setting Dye and Radioactive tracer   Tracer(s) used to identify sentinel nodes in the neoadjuvant setting N/A   All nodes (colored or non-colored) present at the end of a dye-filled lymphatic channel were removed Yes   All significantly radioactive nodes were removed Yes   All palpably suspicious nodes were removed Yes   Biopsy-proven positive nodes marked with clips prior to chemotherapy were identified and removed Yes

## 2024-03-26 LAB
COMMENT: NORMAL
FINAL PATHOLOGIC DIAGNOSIS: NORMAL
GROSS: NORMAL

## 2024-03-27 LAB
GENETIC COUNSELING?: YES
GENSO SPECIMEN TYPE: NORMAL
MISCELLANEOUS GENETIC TEST NAME: NORMAL
PARTENTAL OR SIBLING TESTING?: NO
REFERENCE LAB: NORMAL
TEST RESULT: NORMAL

## 2024-04-01 NOTE — PROGRESS NOTES
Breast Surgical Oncology  Post-operative Visit      REFERRING PHYSICIAN:  Pily, Primary Doctor    MEDICAL ONCOLOGIST:    Sandi Redman M.D.  RADIATION ONCOLOGIST:   DAKOTA    Date of Service: 04/01/2024    DIAGNOSIS:   This is a 64 y.o. 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  Final pathology showed a T2 tumor with negative margins and 2 out of 4 sentinel lymph nodes with metastatic disease.    Lab Results   Component Value Date    FPATHDX  03/20/2024     1. Left breast, partial mastectomy (13 grams):      -  Invasive ductal carcinoma with lobular features and associated I intermediate grade ductal          carcinoma in-situ (DCIS), see synoptic report      -  Vision biopsy clip present, slice 5      -  Reflector clip present, slice 5    2. Left breast, additional inferior margin:      -  Benign fibroadipose breast tissue      -  Negative for atypia or malignancy    3. Left breast, additional medial margin:      -  Benign fibroadipose breast tissue       -  Negative for atypia or malignancy    4. [place] lymph node #1 (hot and blue, target count 127), biopsy:      -  One lymph node, POSITIVE for metastatic carcinoma (1/1)      -  Cone shaped biopsy clip and reflector clip present      -  Largest metastatic deposit:  5 mm      -  Extracapsular extension:  Not definitively identified        -  Immunohistochemical stains for CAM 5.2, AE1/AE3 and wide spectrum keratin are positive           in tumor cells    5. [place] lymph node #2 (hot and blue, target count 113), biopsy:      -  One lymph node, negative for metastatic carcinoma (0/1)      -  Immunohistochemical stains for CAM 5.2, AE1/AE3 and wide spectrum keratin are negative    6. [place] lymph node #3 (hot and blue, target count  815), biopsy:      -  One lymph node, POSITIVE for metastatic carcinoma (1/1)      -  Largest metastatic deposit:  1 mm      -  Extracapsular extension: Not identified      -  Immunohistochemical stains for CAM 5.2, AE1/AE3 and wide spectrum keratin are positive          in tumor cells    7. Non sentinel lymph node #1, biopsy:      -  One lymph node, negative for metastatic carcinoma (0/1)      SYNOPTIC REPORT    PROCEDURE:  Partial mastectomy  SPECIMEN LATERALITY:  Left breast  TUMOR SITE:  1 o'clock position  HISTOLOGIC TYPE:  Invasive ductal carcinoma with lobular features  HISTOLOGIC GRADE:  Grade 2 of 3        Tubule formation:  3        Nuclear pleomorphism:  2        Mitotic rate:  1  TUMOR SIZE:  2.5 x 1.6 x 1.2 cm (measured on glass slide)  TUMOR FOCALITY:  Single focus of invasive carcinoma  DUCTAL CARCINOMA IN-SITU (DCIS):  Present, positive for extensive intraductal component (EIC)         Size/extent:  Up to 2 cm focally, present in 4 of 14 breast tissue blocks, present in slice 3-6         Architectural pattern:  Solid and cribriform         Nuclear grade:  Intermediate (Grade II)         Necrosis:   Present, central/expansive   TUMOR EXTENT:  Not applicable (skin, nipple and skeletal muscle are absent)  LYMPHOVASCULAR INVASION:  Not identified  MICROCALCIFICATIONS:  Not identified  TREATMENT EFFECT:  No known pre-surgical therapy  MARGINS FOR INVASIVE CARCINOMA:        Medial:  POSITIVE in main specimen, negative in separately submitted additional margin                      (specimen #3)         Inferior:  2 mm on main specimen, negative in separately submitted additional lateral margin                        (specimen #2)         Lateral:  2 mm         Superior:  2 mm         Posterior/deep: 6 mm         Anterior:  Greater than 10 mm  MARGINS FOR DUCTAL CARCINOMA IN-SITU (DCIS):        Medial:  Less than 1 mm on main specimen, negative in separately submitted additional margin                        (specimen #3)         Inferior:  Less than 1 mm on main specimen, negative in separately submitted additional                           lateral margin (specimen #2)         Lateral:  5 mm         Superior:  5 mm         Posterior/deep:  Greater than 10 mm         Anterior:  Less than 1 mm     REGIONAL LYMPH NODES:          Two of four total lymph nodes, POSITIVE for metastatic carcinoma ( 2/4)                    Number of sentinel lymph node submitted:  3                    Number of positive sentinel lymph nodes:  2                    Number of non sentinel lymph nodes submitted: 1                    Number positive nonsentinel lymph nodes:  0                    Largest metastatic deposit:  5 mm                    Extracapsular extension:  Not identified  BREAST BIOMARKERS (performed on patient's previous biopsy specimen [facility]-):            ER:  Positive (strong, 90-95%)            OH:  Positive (weak - intermediate, 20-30%)            Her2:  Negative (0)            Ki67:  40%    PATHOLOGIC STAGE CLASSIFICATION: pT2  pN1a         INTERVAL HISTORY:   Joyce Velarde comes in for a post-op check.  She denies fever, chills, chest pain or shortness of breath.  Her pain is well controlled.      MEDICATIONS:     Current Outpatient Medications   Medication Sig Dispense Refill    ascorbic acid, vitamin C, (VITAMIN C) 500 MG tablet Take 500 mg by mouth once daily.      b complex vitamins capsule Take 1 capsule by mouth once daily.      ondansetron (ZOFRAN-ODT) 8 MG TbDL Dissolve 1 tablet (8 mg total) by mouth every 8 (eight) hours as needed (Nausea). 12 tablet 2    phentermine (ADIPEX-P) 37.5 mg tablet Take 37.5 mg by mouth before breakfast.      traMADoL (ULTRAM) 50 mg tablet Take 1 tablet (50 mg total) by mouth every 6 (six) hours as needed for Pain. 15 tablet 0    vitamin D (VITAMIN D3) 1000 units Tab Take 1,000 Units by mouth once daily.       No current facility-administered medications for this visit.        ALLERGIES:   Review of patient's allergies indicates:  No Known Allergies    PHYSICAL EXAMINATION:   General:  This is a well appearing female with appropriate speech, affect and gait.     Breast:  left breast and axillary incision clean, dry, and intact, left axillary seroma present, no signs of infection    AXILLARY ULTRASOUND PROCEDURE    Focused sonography of the  LEFT AXILLA  was performed in 2 views, radial and antiradial.  A(n) anechoic round fluid collection with circumscribed borders was noted in the superficial sentinel lymph node surgical bed. There was no posterior shadowing. The lesion measured 2.15 x 2.08 x 3.07 cm and is parallel to the skin.  The lesion correlates with the palpable abnormality. The findings are most consistent with a postoperative seroma.  BI-RADS 2.    Ultrasound-Guided Aspiration Procedure - Left Axilla    The risks benefits and alternatives were described to the patient and written consent was obtained.  The patient's left breast was prepped using betadine.  2 ccs of 1% Lidocaine was injected in the skin.  Under ultrasound guidance, the fluid collection was aspirated using an 18 gauge needle.  25 ccs of serous fluid was obtained.  The collection completely decompressed.  Images were stored before and after the aspiration procedure for documentation in the medical record.  The fluid was serous. The patient tolerated the procedure well and no complications were noted.  She was given post-procedure instructions.       ASSESSMENT:   The patient has had an uneventful postoperative course.    PLAN:   1. Return in 2 weeks for a follow up office visit and breast exam - seroma check and clearance for radiation  2. The patient is advised in continued exam of the breast chest wall and to report to this office sooner should she note any areas of abnormality or concern.   3.  She has been instructed to meet with med onc and rad onc for discussion of adjuvant treatment recommendations  4.  A copy of her pathology was provided today.     Lynda Bhandari M.D.

## 2024-04-02 ENCOUNTER — OFFICE VISIT (OUTPATIENT)
Dept: SURGERY | Facility: CLINIC | Age: 64
End: 2024-04-02
Payer: COMMERCIAL

## 2024-04-02 DIAGNOSIS — L76.34 POSTOPERATIVE SEROMA OF SUBCUTANEOUS TISSUE AFTER NON-DERMATOLOGIC PROCEDURE: ICD-10-CM

## 2024-04-02 DIAGNOSIS — Z17.0 MALIGNANT NEOPLASM OF UPPER-OUTER QUADRANT OF LEFT BREAST IN FEMALE, ESTROGEN RECEPTOR POSITIVE: Primary | ICD-10-CM

## 2024-04-02 DIAGNOSIS — C50.412 MALIGNANT NEOPLASM OF UPPER-OUTER QUADRANT OF LEFT BREAST IN FEMALE, ESTROGEN RECEPTOR POSITIVE: Primary | ICD-10-CM

## 2024-04-02 PROCEDURE — 1159F MED LIST DOCD IN RCRD: CPT | Mod: CPTII,S$GLB,, | Performed by: SURGERY

## 2024-04-02 PROCEDURE — 99024 POSTOP FOLLOW-UP VISIT: CPT | Mod: S$GLB,,, | Performed by: SURGERY

## 2024-04-02 PROCEDURE — 99999 PR PBB SHADOW E&M-EST. PATIENT-LVL II: CPT | Mod: PBBFAC,,, | Performed by: SURGERY

## 2024-04-04 ENCOUNTER — DOCUMENTATION ONLY (OUTPATIENT)
Dept: HEMATOLOGY/ONCOLOGY | Facility: CLINIC | Age: 64
End: 2024-04-04
Payer: COMMERCIAL

## 2024-04-04 ENCOUNTER — OFFICE VISIT (OUTPATIENT)
Dept: HEMATOLOGY/ONCOLOGY | Facility: CLINIC | Age: 64
End: 2024-04-04
Payer: COMMERCIAL

## 2024-04-04 ENCOUNTER — PATIENT OUTREACH (OUTPATIENT)
Dept: SURGERY | Facility: CLINIC | Age: 64
End: 2024-04-04
Payer: COMMERCIAL

## 2024-04-04 ENCOUNTER — TUMOR BOARD CONFERENCE (OUTPATIENT)
Dept: SURGERY | Facility: CLINIC | Age: 64
End: 2024-04-04
Payer: COMMERCIAL

## 2024-04-04 ENCOUNTER — LAB VISIT (OUTPATIENT)
Dept: LAB | Facility: HOSPITAL | Age: 64
End: 2024-04-04
Attending: INTERNAL MEDICINE
Payer: COMMERCIAL

## 2024-04-04 VITALS
TEMPERATURE: 98 F | BODY MASS INDEX: 28.12 KG/M2 | HEART RATE: 72 BPM | SYSTOLIC BLOOD PRESSURE: 154 MMHG | DIASTOLIC BLOOD PRESSURE: 87 MMHG | WEIGHT: 164.69 LBS | OXYGEN SATURATION: 99 % | RESPIRATION RATE: 20 BRPM | HEIGHT: 64 IN

## 2024-04-04 DIAGNOSIS — C50.412 MALIGNANT NEOPLASM OF UPPER-OUTER QUADRANT OF LEFT BREAST IN FEMALE, ESTROGEN RECEPTOR POSITIVE: Primary | ICD-10-CM

## 2024-04-04 DIAGNOSIS — C50.412 MALIGNANT NEOPLASM OF UPPER-OUTER QUADRANT OF LEFT BREAST IN FEMALE, ESTROGEN RECEPTOR POSITIVE: ICD-10-CM

## 2024-04-04 DIAGNOSIS — M85.80 OSTEOPENIA, UNSPECIFIED LOCATION: ICD-10-CM

## 2024-04-04 DIAGNOSIS — Z17.0 MALIGNANT NEOPLASM OF UPPER-OUTER QUADRANT OF LEFT BREAST IN FEMALE, ESTROGEN RECEPTOR POSITIVE: Primary | ICD-10-CM

## 2024-04-04 DIAGNOSIS — C77.3 MALIGNANT NEOPLASM METASTATIC TO LYMPH NODE OF AXILLA: ICD-10-CM

## 2024-04-04 DIAGNOSIS — Z17.0 MALIGNANT NEOPLASM OF UPPER-OUTER QUADRANT OF LEFT BREAST IN FEMALE, ESTROGEN RECEPTOR POSITIVE: ICD-10-CM

## 2024-04-04 LAB
ALBUMIN SERPL BCP-MCNC: 4 G/DL (ref 3.5–5.2)
ALP SERPL-CCNC: 68 U/L (ref 55–135)
ALT SERPL W/O P-5'-P-CCNC: 19 U/L (ref 10–44)
ANION GAP SERPL CALC-SCNC: 6 MMOL/L (ref 8–16)
AST SERPL-CCNC: 18 U/L (ref 10–40)
BASOPHILS # BLD AUTO: 0.03 K/UL (ref 0–0.2)
BASOPHILS NFR BLD: 0.4 % (ref 0–1.9)
BILIRUB SERPL-MCNC: 0.2 MG/DL (ref 0.1–1)
BUN SERPL-MCNC: 14 MG/DL (ref 8–23)
CALCIUM SERPL-MCNC: 9.3 MG/DL (ref 8.7–10.5)
CHLORIDE SERPL-SCNC: 107 MMOL/L (ref 95–110)
CO2 SERPL-SCNC: 28 MMOL/L (ref 23–29)
CREAT SERPL-MCNC: 1.2 MG/DL (ref 0.5–1.4)
DIFFERENTIAL METHOD BLD: NORMAL
EOSINOPHIL # BLD AUTO: 0.4 K/UL (ref 0–0.5)
EOSINOPHIL NFR BLD: 5.7 % (ref 0–8)
ERYTHROCYTE [DISTWIDTH] IN BLOOD BY AUTOMATED COUNT: 12 % (ref 11.5–14.5)
EST. GFR  (NO RACE VARIABLE): 51 ML/MIN/1.73 M^2
GLUCOSE SERPL-MCNC: 94 MG/DL (ref 70–110)
HCT VFR BLD AUTO: 38.9 % (ref 37–48.5)
HGB BLD-MCNC: 12.5 G/DL (ref 12–16)
IMM GRANULOCYTES # BLD AUTO: 0.01 K/UL (ref 0–0.04)
IMM GRANULOCYTES NFR BLD AUTO: 0.1 % (ref 0–0.5)
LYMPHOCYTES # BLD AUTO: 1.9 K/UL (ref 1–4.8)
LYMPHOCYTES NFR BLD: 26.7 % (ref 18–48)
MAGNESIUM SERPL-MCNC: 1.9 MG/DL (ref 1.6–2.6)
MCH RBC QN AUTO: 28.8 PG (ref 27–31)
MCHC RBC AUTO-ENTMCNC: 32.1 G/DL (ref 32–36)
MCV RBC AUTO: 90 FL (ref 82–98)
MONOCYTES # BLD AUTO: 0.4 K/UL (ref 0.3–1)
MONOCYTES NFR BLD: 6.3 % (ref 4–15)
NEUTROPHILS # BLD AUTO: 4.3 K/UL (ref 1.8–7.7)
NEUTROPHILS NFR BLD: 60.8 % (ref 38–73)
NRBC BLD-RTO: 0 /100 WBC
PHOSPHATE SERPL-MCNC: 4 MG/DL (ref 2.7–4.5)
PLATELET # BLD AUTO: 182 K/UL (ref 150–450)
PMV BLD AUTO: 12.5 FL (ref 9.2–12.9)
POTASSIUM SERPL-SCNC: 4.5 MMOL/L (ref 3.5–5.1)
PROT SERPL-MCNC: 6.3 G/DL (ref 6–8.4)
RBC # BLD AUTO: 4.34 M/UL (ref 4–5.4)
SODIUM SERPL-SCNC: 141 MMOL/L (ref 136–145)
WBC # BLD AUTO: 7.04 K/UL (ref 3.9–12.7)

## 2024-04-04 PROCEDURE — 99999 PR PBB SHADOW E&M-EST. PATIENT-LVL III: CPT | Mod: PBBFAC,,, | Performed by: INTERNAL MEDICINE

## 2024-04-04 PROCEDURE — 84100 ASSAY OF PHOSPHORUS: CPT | Performed by: INTERNAL MEDICINE

## 2024-04-04 PROCEDURE — 99214 OFFICE O/P EST MOD 30 MIN: CPT | Mod: S$GLB,,, | Performed by: INTERNAL MEDICINE

## 2024-04-04 PROCEDURE — 99358 PROLONG SERVICE W/O CONTACT: CPT | Mod: S$GLB,,, | Performed by: SURGERY

## 2024-04-04 PROCEDURE — 3079F DIAST BP 80-89 MM HG: CPT | Mod: CPTII,S$GLB,, | Performed by: INTERNAL MEDICINE

## 2024-04-04 PROCEDURE — 3077F SYST BP >= 140 MM HG: CPT | Mod: CPTII,S$GLB,, | Performed by: INTERNAL MEDICINE

## 2024-04-04 PROCEDURE — 80053 COMPREHEN METABOLIC PANEL: CPT | Performed by: INTERNAL MEDICINE

## 2024-04-04 PROCEDURE — 85025 COMPLETE CBC W/AUTO DIFF WBC: CPT | Performed by: INTERNAL MEDICINE

## 2024-04-04 PROCEDURE — 1159F MED LIST DOCD IN RCRD: CPT | Mod: CPTII,S$GLB,, | Performed by: INTERNAL MEDICINE

## 2024-04-04 PROCEDURE — 36415 COLL VENOUS BLD VENIPUNCTURE: CPT | Performed by: INTERNAL MEDICINE

## 2024-04-04 PROCEDURE — 83735 ASSAY OF MAGNESIUM: CPT | Performed by: INTERNAL MEDICINE

## 2024-04-04 PROCEDURE — 3008F BODY MASS INDEX DOCD: CPT | Mod: CPTII,S$GLB,, | Performed by: INTERNAL MEDICINE

## 2024-04-04 NOTE — PROGRESS NOTES
Patient ID: Joyce Velarde   Chief Complaint: Follow-up  MRN:  [id number]     Oncologic Diagnosis:    L Stage IIB (cT1b cN1 Mx) (pT2 pN1a M0) Breast Invasive Mammary Carcinoma, G2, +/+/-, ki-67 40%  DCIS, G2  L Stage IB (cT1b cN1 Mx) Breast Invasive Mammary Carcinoma, G2, +/+/-, ki-67 40%  Previous Treatment:  s/p BCS 03/20/24  Current Treatment:  Pending  Subjective   The patient presents for follow up and is accompanied by her  and her sister.    We had an extensive discussion regarding her pathology results most notably upstaging her from a clinical Stage 1B to a pathologic Stage IIB with 2 positive lymph nodes and positivity for G2 DCIS.  I discussed the risk vs benefit of adjuvant chemotherapy and what information the oncotype DX testing would yield. We also reviewed her previous DXA scan.  She is in agreement with proceeding with whatever treatment is recommended. She has no acute complaints and feels well.    Review of Systems   Constitutional:  Negative for activity change, appetite change, chills, diaphoresis, fatigue, fever and unexpected weight change.   HENT:  Negative for nosebleeds.    Respiratory:  Negative for shortness of breath.    Cardiovascular:  Negative for chest pain.   Gastrointestinal:  Negative for abdominal distention, abdominal pain, anal bleeding, blood in stool, constipation, diarrhea, nausea and vomiting.   Genitourinary:  Negative for difficulty urinating and hematuria.   Musculoskeletal:  Negative for arthralgias, back pain and myalgias.   Skin:  Negative for rash.   Neurological:  Negative for dizziness, weakness, light-headedness and headaches.   Hematological:  Does not bruise/bleed easily.   Psychiatric/Behavioral:  The patient is not nervous/anxious.      History   Previous HPI  Joyce Velarde is a 64 y.o. female who presents to clinic to establish care on referral for newly diagnosed breast cancer.    The patient had left breast abnormality detected on  screening mammogram in suspicious appearing lymph node.  Biopsy resulted as positive for invasive mammary carcinoma in the breast, and the lymph nodes positive as well.  The patient denies any breast signs or symptoms.  She has a significant family history of malignancy in his already been referred to the genetic counselor.    She has no acute complaints today.  I reviewed her biopsy pathology as well as imaging.  Thankfully there is no signs of distant metastatic disease.  Discussed with her Oncotype DX testing and how it we will form a treatment decision.  I also discussed that depending on final pathology the test may be moot.  If she has other lymph nodes positive I will be recommending chemotherapy even if she has a lower Oncotype score.  She is in agreement to comply with whatever her treatment team recommends.      Oncology History   Malignant neoplasm of upper-outer quadrant of left breast in female, estrogen receptor positive   2024 Initial Diagnosis    Malignant neoplasm of upper-outer quadrant of left breast in female, estrogen receptor positive     2024 Cancer Staged    Staging form: Breast, AJCC 8th Edition  - Clinical stage from 2024: Stage IB (cT1b, cN1(f), cM0, G2, ER+, TN+, HER2-)     2024 Cancer Staged    Staging form: Breast, AJCC 8th Edition  - Pathologic stage from 2024: Stage IB (pT2, pN1(sn), cM0, G2, ER+, TN+, HER2-)         Past Surgical History:   Procedure Laterality Date    BILATERAL TUBAL LIGATION       SECTION      X 2    INJECTION FOR SENTINEL NODE IDENTIFICATION Left 3/20/2024    Procedure: INJECTION, FOR SENTINEL NODE IDENTIFICATION;  Surgeon: Lynda Bhandari MD;  Location: [facility] OR;  Service: General;  Laterality: Left;    LUMPECTOMY, WITH RADAR LOCALIZATION USING GEETA  Left 3/20/2024    Procedure: LUMPECTOMY,WITH RADAR LOCALIZATION USING GEETA ;  Surgeon: Lynda Bhandari MD;  Location: [facility] OR;  Service: General;  Laterality: Left;   "Left Lumpectomy Using Preoperatively Placed Radiographic Marker (Rere )  Left Targeted Axillary Lymph Node Dissection  Injection of Isosulfan Blue Dye  Interpretation of Specimen Mammogram    MAPPING, LYMPH NODE, SENTINEL Left 3/20/2024    Procedure: MAPPING, LYMPH NODE, SENTINEL;  Surgeon: Lynda Bhandari MD;  Location: [facility] OR;  Service: General;  Laterality: Left;    SENTINEL LYMPH NODE BIOPSY Left 3/20/2024    Procedure: BIOPSY, LYMPH NODE, SENTINEL;  Surgeon: Lynda Bhandari MD;  Location: [facility] OR;  Service: General;  Laterality: Left;       Family History   Problem Relation Age of Onset    Breast cancer Mother 84        inflammatory    Hypertension Mother     Dementia Mother     Hyperlipidemia Mother     Breast cancer Sister 43        genetic testing negative (~2015)    No Known Problems Brother     No Known Problems Brother     Lung cancer Maternal Grandmother         +smoking hx    Heart disease Maternal Grandmother     Heart disease Maternal Grandfather     Breast cancer Maternal Cousin 53        genetic testing negative (2015)    Breast cancer Paternal Cousin     Breast cancer Other        Review of patient's allergies indicates:  No Known Allergies    Social History     Tobacco Use    Smoking status: Never    Smokeless tobacco: Never   Substance Use Topics    Alcohol use: Yes     Comment: occ    Drug use: Never       Physical Exam   ECOG:   ECOG SCORE    0 - Fully active-able to carry on all pre-disease performance without restriction          Vitals:  BP (!) 154/87   Pulse 72   Temp 97.8 °F (36.6 °C) (Temporal)   Resp 20   Ht 5' 4" (1.626 m)   Wt 74.7 kg (164 lb 10.9 oz)   SpO2 99%   BMI 28.27 kg/m²     Physical Exam  Constitutional:       General: She is not in acute distress.     Appearance: Normal appearance. She is not ill-appearing.   HENT:      Head: Normocephalic and atraumatic.   Eyes:      Extraocular Movements: Extraocular movements intact.      Conjunctiva/sclera: " Conjunctivae normal.   Cardiovascular:      Rate and Rhythm: Normal rate and regular rhythm.      Heart sounds: No murmur heard.  Pulmonary:      Effort: Pulmonary effort is normal. No respiratory distress.   Abdominal:      Palpations: There is no hepatomegaly or splenomegaly.   Musculoskeletal:      Cervical back: Neck supple. No tenderness.   Skin:     Findings: No rash.   Neurological:      General: No focal deficit present.      Mental Status: She is alert and oriented to person, place, and time.   Psychiatric:         Mood and Affect: Mood normal.         Behavior: Behavior normal.         Thought Content: Thought content normal.        Labs   Labs:  Lab Visit on 04/04/2024   Component Date Value Ref Range Status    Phosphorus 04/04/2024 4.0  2.7 - 4.5 mg/dL Final    Magnesium 04/04/2024 1.9  1.6 - 2.6 mg/dL Final    Sodium 04/04/2024 141  136 - 145 mmol/L Final    Potassium 04/04/2024 4.5  3.5 - 5.1 mmol/L Final    Chloride 04/04/2024 107  95 - 110 mmol/L Final    CO2 04/04/2024 28  23 - 29 mmol/L Final    Glucose 04/04/2024 94  70 - 110 mg/dL Final    BUN 04/04/2024 14  8 - 23 mg/dL Final    Creatinine 04/04/2024 1.2  0.5 - 1.4 mg/dL Final    Calcium 04/04/2024 9.3  8.7 - 10.5 mg/dL Final    Total Protein 04/04/2024 6.3  6.0 - 8.4 g/dL Final    Albumin 04/04/2024 4.0  3.5 - 5.2 g/dL Final    Total Bilirubin 04/04/2024 0.2  0.1 - 1.0 mg/dL Final    Comment: For infants and newborns, interpretation of results should be based  on gestational age, weight and in agreement with clinical  observations.    Premature Infant recommended reference ranges:  Up to 24 hours.............<8.0 mg/dL  Up to 48 hours............<12.0 mg/dL  3-5 days..................<15.0 mg/dL  6-29 days.................<15.0 mg/dL      Alkaline Phosphatase 04/04/2024 68  55 - 135 U/L Final    AST 04/04/2024 18  10 - 40 U/L Final    ALT 04/04/2024 19  10 - 44 U/L Final    eGFR 04/04/2024 51 (A)  >60 mL/min/1.73 m^2 Final    Anion Gap  04/04/2024 6 (L)  8 - 16 mmol/L Final    WBC 04/04/2024 7.04  3.90 - 12.70 K/uL Final    RBC 04/04/2024 4.34  4.00 - 5.40 M/uL Final    Hemoglobin 04/04/2024 12.5  12.0 - 16.0 g/dL Final    Hematocrit 04/04/2024 38.9  37.0 - 48.5 % Final    MCV 04/04/2024 90  82 - 98 fL Final    MCH 04/04/2024 28.8  27.0 - 31.0 pg Final    MCHC 04/04/2024 32.1  32.0 - 36.0 g/dL Final    RDW 04/04/2024 12.0  11.5 - 14.5 % Final    Platelets 04/04/2024 182  150 - 450 K/uL Final    MPV 04/04/2024 12.5  9.2 - 12.9 fL Final    Immature Granulocytes 04/04/2024 0.1  0.0 - 0.5 % Final    Gran # (ANC) 04/04/2024 4.3  1.8 - 7.7 K/uL Final    Immature Grans (Abs) 04/04/2024 0.01  0.00 - 0.04 K/uL Final    Comment: Mild elevation in immature granulocytes is non specific and   can be seen in a variety of conditions including stress response,   acute inflammation, trauma and pregnancy. Correlation with other   laboratory and clinical findings is essential.      Lymph # 04/04/2024 1.9  1.0 - 4.8 K/uL Final    Mono # 04/04/2024 0.4  0.3 - 1.0 K/uL Final    Eos # 04/04/2024 0.4  0.0 - 0.5 K/uL Final    Baso # 04/04/2024 0.03  0.00 - 0.20 K/uL Final    nRBC 04/04/2024 0  0 /100 WBC Final    Gran % 04/04/2024 60.8  38.0 - 73.0 % Final    Lymph % 04/04/2024 26.7  18.0 - 48.0 % Final    Mono % 04/04/2024 6.3  4.0 - 15.0 % Final    Eosinophil % 04/04/2024 5.7  0.0 - 8.0 % Final    Basophil % 04/04/2024 0.4  0.0 - 1.9 % Final    Differential Method 04/04/2024 Automated   Final        Pathology   Specimen to Pathology, Radiology Breast, needle biopsy - 02/21/2024      Component 3 wk ago   Final Pathologic Diagnosis     Part 1  Breast, left, 1 o'clock position, ultrasound-guided biopsy:  Invasive mammary carcinoma, moderately differentiated  [place] grade 2:  Tubule formation-3, nuclear pleomorphism-2 and mitotic count -1  Invasive carcinoma measures 7 mm in greatest dimension  No carcinoma in-situ or lymphovascular invasion is identified  All submitted  cores are involved by invasive carcinoma      Part 2  Axilla, left, ultrasound-guided biopsy:  Positive for metastatic carcinoma  Metastatic focus measures 4.5 mm in greatest dimension  Extranodal extension not definitively identified    ** tumor biomarkers and additional stains will be performed with results provided in a supplemental report.    This case was reviewed by Dr. RODNEY Hankins who concurs with the above diagnoses. VC      Comment: Interp By Geeta Saunders M.D., Signed on 02/26/2024 at 11:35   Supplemental Diagnosis     Tumor biomarkers  Estrogen receptor (ER): Positive, 90-95%, strong  Progesterone receptor (PGR): Positive, 20-30%, weak to intermediate  HER2 (IHC):  Negative, 0  Ki-67: 40%    Additional biomarkers:  AE1/AE3: Highlights the extent of invasive carcinoma  E cadherin: Displays strong membraneous staining, supporting ductal differentiation  P63:  Displays an absence of staining for myoepithelial cells, supporting the diagnosis of invasive carcinoma    All immunostains were performed with appropriate controls. VC      Gross     Pathology ID:  [id number]  Patient ID:  [id number]  Received in 2 parts:  Part 1:  Pathology ID:  [id number]  Patient ID:  [id number]  The specimen is received in formalin labeled &quot;left breast 1:00 in formalin 8:56&quot;.  The specimen consists of multiple yellow needle biopsy fragments measuring 1.7 x 1.1 x 0.1 cm in aggregate.  The specimen is submitted entirely in cassette  FLJ--1-A.    Specimen has been in formalin for more than 6 hours and less than 72 hours.  Ischemic time is not provided.    Part 2:  Pathology ID:  [id number]  Patient ID:  [id number]  The specimen is received in formalin labeled &quot;left axilla in formalin 9:16&quot;.  The specimen consists of 5 tan needle biopsy fragments measuring 1.4 x 0.8 x 0.1 cm in aggregate.  The specimen is submitted entirely in cassette CVX--2-A.    Specimen has been in formalin for more than 6 hours and less than 72  hours.  Ischemic time is not provided.  Raphael Lemus                Imaging   CT CHEST ABDOMEN PELVIS WITH IV CONTRAST (XPD) - 03/07/2024  TECHNIQUE:  Low dose axial images, sagittal and coronal reformations were obtained from the thoracic inlet to the pubic symphysis following the IV administration of 75 mL of Omnipaque 350 .  Oral contrast administered.  COMPARISON:  None     FINDINGS:  Chest:  Heart and great vessels: Mild atherosclerotic calcification  Adenopathy: No pathologically enlarged axillary, mediastinal or hilar lymph nodes.  Lungs: No concerning abnormality.  Abdomen:  Liver: Multiple small hypodensities, too small to characterize but likely cysts or hemangiomas.  Gallbladder and biliary: Unremarkable.  Spleen: Unremarkable.  Pancreas: Unremarkable.  Adrenals: Unremarkable.  Kidneys: Unremarkable.  Stomach/Bowel: Stomach is unremarkable.  Small bowel nonobstructive.  No concerning colonic abnormality.  Peritoneum: No ascites or pneumoperitoneum.  Abdominal Adenopathy: Central mesenteric fat stranding present with multiple small to upper limits of normal mesenteric lymph nodes.  Vasculature: Mild atherosclerotic plaquing present.  Pelvis:  Urinary bladder: Unremarkable.  Pelvis adenopathy: None.  Bones: No acute abnormality.  Well-circumscribed T3 lucent focus, suspected hemangioma.  Miscellaneous: Known left breast malignancy noted.  Biopsy clip noted within small left axillary lymph node as well.     Impression:  Known left breast malignancy without definite distant metastatic disease.  Central mesenteric fat stranding with small to upper limits normal lymph nodes.  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  Finding can also be idiopathic or associated with lymphoma and other entities. Lymphoma and other entities felt less likely.  Attention on follow-up.      NM BONE SCAN WHOLE BODY - 03/07/2024  CLINICAL  HISTORY:  Breast cancer, invasive, stage I/II/III, initial workup;  Secondary and unspecified malignant neoplasm of axilla and upper limb lymph nodes     TECHNIQUE:  Following the IV administration of 20 mCi of Tc-99m-MDP, anterior and posterior delayed whole body images were acquired.     COMPARISON:  None.     FINDINGS:  There is physiologic distribution of the radiopharmaceutical throughout the skeleton. Both kidneys and the bladder appear normal.     Impression:  There is no scintigraphic evidence of metastatic disease.    Assessment and Plan   L Stage IIB (cT1b cN1 Mx) (pT2 pN1a M0) Breast Invasive Mammary Carcinoma, G2, +/+/-, ki-67 40%  DCIS, G2  TB Presentation 03/07/2024: consensus for upfront surgery, genetic testing, systemic imaging, post op radiation and additional observation  Staging CT CAP and Bone Scan showed no evidence of metastatic disease  s/p L Breast Lumpectomy 03/20/2024:   Final pathology significant for tumor size 2.5 x 1.6 x 1.2 cm,  2/4 + LN, G2 DCIS  Discussed final pathology with significance for lymph node positivity and concern for high risk of recurrence. Oncotype Dx pending but discussed in detail with patient that I would recommend adjuvant chemotherapy given + lymph nodes; If Oncotype score incredibly low then it would be reasonable to forego chemotherapy at which point I would also recommend two years of adjuvant abemaciclib in combination with endocrine therapy after radiation      Osteopenia  Last DEXA 07/2022 showed osteopenia  Repeat DEXA July 2024  Recommended Ca/Vit D supplementation and likely bisphosphate given she is going to start AI      Cancer Screening  PAP Smear: Due and scheduled in May 2024  Colonoscopy 06/29/2020: patient reports she had a few polyps; not sure when she was due for another one; will refer to GI for colonoscopy after she has completed treatment for breast cancer        Med Onc Chart Routing      Follow up with physician 2 weeks. To review Oncotype    Follow up with WILL    Infusion scheduling note    Injection scheduling note    Labs CBC, CMP, magnesium and phosphorus   Scheduling:  Preferred lab:  Lab interval:     Imaging DXA scan   Schedule July 2024   Pharmacy appointment    Other referrals              The patient was seen, interviewed and examined. Pertinent lab and radiologic studies were reviewed. Pt instructed to call should they develop concerning signs/symptoms or have further questions.        Portions of the record may have been created with voice recognition software. Occasional wrong-word or sound-a-like substitutions may have occurred due to the inherent limitations of voice recognition software. Read the chart carefully and recognize, using context, where substitutions have occurred.      Sandi Tran MD    Hematology/Oncology

## 2024-04-04 NOTE — NURSING
"1420 pm: Oncotype Dx requested per Dr. Tran. Path20 order placed and specimen checked in. [employer] to request tissue specimen Accession# [id number] Collection date: 3/20/2024 for Oncotype Dx.  Oncology Navigation   Intake  Date of Diagnosis: 24  Cancer Type: Breast  Type of Referral: Internal  Date of Referral: 24  Initial Nurse Navigator Contact: 24  Referral to Initial Contact Timeline (days): 0  Date Worked: 24  First Appointment Available: 24  Appointment Date: 24  First Available Date vs. Scheduled Date (days): 0  Multiple appointments: No     Treatment  Current Status: Staging work-up  Date Presented to Tumor Board: 24    Surgery: Planned  Surgical Oncologist: Lynda Bhandari MD  Type of Surgery: Left partial mastectomy with left sentinel lymph node biopsy  Consult Date: 24  Surgery Schedule Date: 24    Medical Oncologist: Sandi Tran MD  Consult Date: 24    Radiation Therapy: Planned    Procedures: Bone scan; CT; Genetic test  CT Schedule Date: 24  Genetic Testing Date Sent: 24    General Referrals: Physical Therapy  Physical Therapy: Samina Vieira  Physical Therapy Referral Date: 24    ER: Positive  SD: Positive  Her2: Negative       Support Systems: Spouse/significant other; Children; Family members  Barriers of Care: Barriers to Care "Assessment completed-no barriers noted"     Acuity  Stage: 1  Surgical Procedure Complexity: 2  Treatment Tolerability: Has not started treatment yet/treatment fully completed and side effects resolved  ECO  Comorbidities in Medical History: 1  Hospitalization Within the Past Month: 0   Needed: 0  Support: 0  Verbalizes Financial Concerns: 0  Transportation: 0  Psychological Factors (+1 each): Emotional during conversation  History of noncompliance/frequent no shows and cancellations: 0  Verbalizes the need for more education: 1  Other Factors (+1 for Each): " 0  Navigation Acuity: 6     Follow Up  No follow-ups on file.

## 2024-04-04 NOTE — PROGRESS NOTES
The patient was submitted for evaluation in multidisciplinary breast cancer conference ([facility]). Total time spent on preparation for [facility] was 35 minutes, which included review of the past medical history from the PCP, review of the mammogram and other imaging reports, care coordination with medical and radiation oncology, and documentation in the medical record.     Patient with locally advanced hormone receptor positive, [id number] negative invasive breast cancer. She underwent a partial mastectomy with targeted axillary sampling. 2/4 lymph nodes are positive (including the preoperatively localized lymph node). Medical oncology awaiting oncotype for adjuvant recommendations. WBRT recommended pending medical oncology recs.

## 2024-04-08 PROBLEM — M85.80 OSTEOPENIA: Status: ACTIVE | Noted: 2024-04-08

## 2024-04-15 ENCOUNTER — OFFICE VISIT (OUTPATIENT)
Dept: SURGERY | Facility: CLINIC | Age: 64
End: 2024-04-15
Payer: COMMERCIAL

## 2024-04-15 DIAGNOSIS — Z17.0 MALIGNANT NEOPLASM OF UPPER-OUTER QUADRANT OF LEFT BREAST IN FEMALE, ESTROGEN RECEPTOR POSITIVE: Primary | ICD-10-CM

## 2024-04-15 DIAGNOSIS — C50.412 MALIGNANT NEOPLASM OF UPPER-OUTER QUADRANT OF LEFT BREAST IN FEMALE, ESTROGEN RECEPTOR POSITIVE: Primary | ICD-10-CM

## 2024-04-15 PROCEDURE — 99024 POSTOP FOLLOW-UP VISIT: CPT | Mod: S$GLB,,, | Performed by: SURGERY

## 2024-04-15 NOTE — PROGRESS NOTES
Breast Surgical Oncology  Post-operative Visit      REFERRING PHYSICIAN:  Pily, Primary Doctor    MEDICAL ONCOLOGIST:    Sandi Redman M.D.  RADIATION ONCOLOGIST:   DAKOTA    Date of Service: 04/14/2024    DIAGNOSIS:   This is a 64 y.o. 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  Final pathology showed 2.5 cm tumor with associated DCIS. Her margins are negative and 2/4 sentinel lymph nodes are positive for metastatic disease, including the clipped node.    Lab Results   Component Value Date    FPATHDX  03/20/2024     1. Left breast, partial mastectomy (13 grams):      -  Invasive ductal carcinoma with lobular features and associated I intermediate grade ductal          carcinoma in-situ (DCIS), see synoptic report      -  Vision biopsy clip present, slice 5      -  Reflector clip present, slice 5    2. Left breast, additional inferior margin:      -  Benign fibroadipose breast tissue      -  Negative for atypia or malignancy    3. Left breast, additional medial margin:      -  Benign fibroadipose breast tissue       -  Negative for atypia or malignancy    4. [place] lymph node #1 (hot and blue, target count 127), biopsy:      -  One lymph node, POSITIVE for metastatic carcinoma (1/1)      -  Cone shaped biopsy clip and reflector clip present      -  Largest metastatic deposit:  5 mm      -  Extracapsular extension:  Not definitively identified        -  Immunohistochemical stains for CAM 5.2, AE1/AE3 and wide spectrum keratin are positive           in tumor cells    5. [place] lymph node #2 (hot and blue, target count 113), biopsy:      -  One lymph node, negative for metastatic carcinoma (0/1)      -  Immunohistochemical stains for CAM 5.2, AE1/AE3 and wide spectrum keratin are  negative    6. [place] lymph node #3 (hot and blue, target count 815), biopsy:      -  One lymph node, POSITIVE for metastatic carcinoma (1/1)      -  Largest metastatic deposit:  1 mm      -  Extracapsular extension: Not identified      -  Immunohistochemical stains for CAM 5.2, AE1/AE3 and wide spectrum keratin are positive          in tumor cells    7. Non sentinel lymph node #1, biopsy:      -  One lymph node, negative for metastatic carcinoma (0/1)      SYNOPTIC REPORT    PROCEDURE:  Partial mastectomy  SPECIMEN LATERALITY:  Left breast  TUMOR SITE:  1 o'clock position  HISTOLOGIC TYPE:  Invasive ductal carcinoma with lobular features  HISTOLOGIC GRADE:  Grade 2 of 3        Tubule formation:  3        Nuclear pleomorphism:  2        Mitotic rate:  1  TUMOR SIZE:  2.5 x 1.6 x 1.2 cm (measured on glass slide)  TUMOR FOCALITY:  Single focus of invasive carcinoma  DUCTAL CARCINOMA IN-SITU (DCIS):  Present, positive for extensive intraductal component (EIC)         Size/extent:  Up to 2 cm focally, present in 4 of 14 breast tissue blocks, present in slice 3-6         Architectural pattern:  Solid and cribriform         Nuclear grade:  Intermediate (Grade II)         Necrosis:   Present, central/expansive   TUMOR EXTENT:  Not applicable (skin, nipple and skeletal muscle are absent)  LYMPHOVASCULAR INVASION:  Not identified  MICROCALCIFICATIONS:  Not identified  TREATMENT EFFECT:  No known pre-surgical therapy  MARGINS FOR INVASIVE CARCINOMA:        Medial:  POSITIVE in main specimen, negative in separately submitted additional margin                      (specimen #3)         Inferior:  2 mm on main specimen, negative in separately submitted additional lateral margin                        (specimen #2)         Lateral:  2 mm         Superior:  2 mm         Posterior/deep: 6 mm         Anterior:  Greater than 10 mm  MARGINS FOR DUCTAL CARCINOMA IN-SITU (DCIS):        Medial:  Less than 1 mm on main specimen,  negative in separately submitted additional margin                       (specimen #3)         Inferior:  Less than 1 mm on main specimen, negative in separately submitted additional                           lateral margin (specimen #2)         Lateral:  5 mm         Superior:  5 mm         Posterior/deep:  Greater than 10 mm         Anterior:  Less than 1 mm     REGIONAL LYMPH NODES:          Two of four total lymph nodes, POSITIVE for metastatic carcinoma ( 2/4)                    Number of sentinel lymph node submitted:  3                    Number of positive sentinel lymph nodes:  2                    Number of non sentinel lymph nodes submitted: 1                    Number positive nonsentinel lymph nodes:  0                    Largest metastatic deposit:  5 mm                    Extracapsular extension:  Not identified  BREAST BIOMARKERS (performed on patient's previous biopsy specimen [facility]-):            ER:  Positive (strong, 90-95%)            NH:  Positive (weak - intermediate, 20-30%)            Her2:  Negative (0)            Ki67:  40%    PATHOLOGIC STAGE CLASSIFICATION: pT2  pN1a         INTERVAL HISTORY:   Joyce Velarde comes in for a post-op check.  She denies fever, chills, chest pain or shortness of breath.  Her pain is well controlled.      Her axillary seroma has resolved post drainage. She now has increased inflammation in her surgical bed causing puckering of her periareolar incision.     MEDICATIONS:     Current Outpatient Medications   Medication Sig Dispense Refill    ascorbic acid, vitamin C, (VITAMIN C) 500 MG tablet Take 500 mg by mouth once daily.      b complex vitamins capsule Take 1 capsule by mouth once daily.      ondansetron (ZOFRAN-ODT) 8 MG TbDL Dissolve 1 tablet (8 mg total) by mouth every 8 (eight) hours as needed (Nausea). (Patient not taking: Reported on 4/4/2024) 12 tablet 2    phentermine (ADIPEX-P) 37.5 mg tablet Take 37.5 mg by mouth before breakfast.       traMADoL (ULTRAM) 50 mg tablet Take 1 tablet (50 mg total) by mouth every 6 (six) hours as needed for Pain. (Patient not taking: Reported on 4/4/2024) 15 tablet 0    vitamin D (VITAMIN D3) 1000 units Tab Take 1,000 Units by mouth once daily.       No current facility-administered medications for this visit.       ALLERGIES:   Review of patient's allergies indicates:  No Known Allergies    PHYSICAL EXAMINATION:   General:  This is a well appearing female with appropriate speech, affect and gait.     Breast:  left breast and axillary incision clean, dry, and intact, inflammation UOQ with puckering of her periareolar incision    ASSESSMENT:   The patient has had an uneventful postoperative course.    PLAN:   1. Return in 3 weeks for a follow up office visit and breast exam  2. The patient is advised in continued exam of the breast chest wall and to report to this office sooner should she note any areas of abnormality or concern.   3.  She has been instructed to meet with med onc for discussion of adjuvant treatment recommendations  4. She is instructed to take 400 mg ibuprofen BID for inflammation. She is not cleared for radiation until her inflammation has settled.     Lynda Bhandari M.D.

## 2024-04-16 DIAGNOSIS — C50.412 MALIGNANT NEOPLASM OF UPPER-OUTER QUADRANT OF LEFT BREAST IN FEMALE, ESTROGEN RECEPTOR POSITIVE: Primary | ICD-10-CM

## 2024-04-16 DIAGNOSIS — Z17.0 MALIGNANT NEOPLASM OF UPPER-OUTER QUADRANT OF LEFT BREAST IN FEMALE, ESTROGEN RECEPTOR POSITIVE: Primary | ICD-10-CM

## 2024-04-17 ENCOUNTER — LAB VISIT (OUTPATIENT)
Dept: LAB | Facility: HOSPITAL | Age: 64
End: 2024-04-17
Attending: INTERNAL MEDICINE
Payer: COMMERCIAL

## 2024-04-17 DIAGNOSIS — C77.3 MALIGNANT NEOPLASM METASTATIC TO LYMPH NODE OF AXILLA: ICD-10-CM

## 2024-04-17 LAB
ALBUMIN SERPL BCP-MCNC: 4.1 G/DL (ref 3.5–5.2)
ALP SERPL-CCNC: 66 U/L (ref 55–135)
ALT SERPL W/O P-5'-P-CCNC: 24 U/L (ref 10–44)
ANION GAP SERPL CALC-SCNC: 10 MMOL/L (ref 8–16)
AST SERPL-CCNC: 24 U/L (ref 10–40)
BASOPHILS # BLD AUTO: 0.03 K/UL (ref 0–0.2)
BASOPHILS NFR BLD: 0.6 % (ref 0–1.9)
BILIRUB SERPL-MCNC: 0.4 MG/DL (ref 0.1–1)
BUN SERPL-MCNC: 7 MG/DL (ref 8–23)
CALCIUM SERPL-MCNC: 10.3 MG/DL (ref 8.7–10.5)
CHLORIDE SERPL-SCNC: 103 MMOL/L (ref 95–110)
CO2 SERPL-SCNC: 28 MMOL/L (ref 23–29)
CREAT SERPL-MCNC: 0.7 MG/DL (ref 0.5–1.4)
DIFFERENTIAL METHOD BLD: NORMAL
EOSINOPHIL # BLD AUTO: 0.2 K/UL (ref 0–0.5)
EOSINOPHIL NFR BLD: 4.6 % (ref 0–8)
ERYTHROCYTE [DISTWIDTH] IN BLOOD BY AUTOMATED COUNT: 12.5 % (ref 11.5–14.5)
EST. GFR  (NO RACE VARIABLE): >60 ML/MIN/1.73 M^2
GLUCOSE SERPL-MCNC: 99 MG/DL (ref 70–110)
HCT VFR BLD AUTO: 41.6 % (ref 37–48.5)
HGB BLD-MCNC: 13.5 G/DL (ref 12–16)
IMM GRANULOCYTES # BLD AUTO: 0.01 K/UL (ref 0–0.04)
IMM GRANULOCYTES NFR BLD AUTO: 0.2 % (ref 0–0.5)
LYMPHOCYTES # BLD AUTO: 1.5 K/UL (ref 1–4.8)
LYMPHOCYTES NFR BLD: 28 % (ref 18–48)
MAGNESIUM SERPL-MCNC: 2 MG/DL (ref 1.6–2.6)
MCH RBC QN AUTO: 28.7 PG (ref 27–31)
MCHC RBC AUTO-ENTMCNC: 32.5 G/DL (ref 32–36)
MCV RBC AUTO: 89 FL (ref 82–98)
MONOCYTES # BLD AUTO: 0.4 K/UL (ref 0.3–1)
MONOCYTES NFR BLD: 6.7 % (ref 4–15)
NEUTROPHILS # BLD AUTO: 3.1 K/UL (ref 1.8–7.7)
NEUTROPHILS NFR BLD: 59.9 % (ref 38–73)
NRBC BLD-RTO: 0 /100 WBC
PHOSPHATE SERPL-MCNC: 4.5 MG/DL (ref 2.7–4.5)
PLATELET # BLD AUTO: 188 K/UL (ref 150–450)
PMV BLD AUTO: 12.4 FL (ref 9.2–12.9)
POTASSIUM SERPL-SCNC: 4.5 MMOL/L (ref 3.5–5.1)
PROT SERPL-MCNC: 7.1 G/DL (ref 6–8.4)
RBC # BLD AUTO: 4.7 M/UL (ref 4–5.4)
SODIUM SERPL-SCNC: 141 MMOL/L (ref 136–145)
WBC # BLD AUTO: 5.22 K/UL (ref 3.9–12.7)

## 2024-04-17 PROCEDURE — 80053 COMPREHEN METABOLIC PANEL: CPT | Performed by: INTERNAL MEDICINE

## 2024-04-17 PROCEDURE — 83735 ASSAY OF MAGNESIUM: CPT | Performed by: INTERNAL MEDICINE

## 2024-04-17 PROCEDURE — 85025 COMPLETE CBC W/AUTO DIFF WBC: CPT | Performed by: INTERNAL MEDICINE

## 2024-04-17 PROCEDURE — 36415 COLL VENOUS BLD VENIPUNCTURE: CPT | Performed by: INTERNAL MEDICINE

## 2024-04-17 PROCEDURE — 84100 ASSAY OF PHOSPHORUS: CPT | Performed by: INTERNAL MEDICINE

## 2024-04-18 ENCOUNTER — DOCUMENTATION ONLY (OUTPATIENT)
Dept: HEMATOLOGY/ONCOLOGY | Facility: CLINIC | Age: 64
End: 2024-04-18
Payer: COMMERCIAL

## 2024-04-18 NOTE — NURSING
"1632 pm: Oncotype Dx breast results received. Report scanned into media. Dr. Tran and Dr. Bhandari notified via in-basket msg.   Oncology Navigation   Intake  Date of Diagnosis: 24  Cancer Type: Breast  Type of Referral: Internal  Date of Referral: 24  Initial Nurse Navigator Contact: 24  Referral to Initial Contact Timeline (days): 0  Date Worked: 24  First Appointment Available: 24  Appointment Date: 24  First Available Date vs. Scheduled Date (days): 0  Multiple appointments: No     Treatment  Current Status: Staging work-up  Date Presented to Tumor Board: 24    Surgery: Planned  Surgical Oncologist: Lynda Bhandari MD  Type of Surgery: Left partial mastectomy with left sentinel lymph node biopsy  Consult Date: 24  Surgery Schedule Date: 24    Medical Oncologist: Sandi Tran MD  Consult Date: 24    Radiation Therapy: Planned    Procedures: Genomic testing  CT Schedule Date: 24  Genetic Testing Date Sent: 24  Genomic Testing Date Sent: 24    General Referrals: Physical Therapy  Physical Therapy: Samina Vieira  Physical Therapy Referral Date: 24    ER: Positive  RI: Positive  Her2: Negative       Support Systems: Spouse/significant other; Children; Family members  Barriers of Care: Barriers to Care "Assessment completed-no barriers noted"     Acuity  Stage: 1  Surgical Procedure Complexity: 2  Treatment Tolerability: Has not started treatment yet/treatment fully completed and side effects resolved  ECO  Comorbidities in Medical History: 1  Hospitalization Within the Past Month: 0   Needed: 0  Support: 0  Verbalizes Financial Concerns: 0  Transportation: 0  Psychological Factors (+1 each): Emotional during conversation  History of noncompliance/frequent no shows and cancellations: 0  Verbalizes the need for more education: 1  Other Factors (+1 for Each): 0  Navigation Acuity: 6     Follow Up  No follow-ups " on file.

## 2024-04-19 ENCOUNTER — TELEPHONE (OUTPATIENT)
Dept: HEMATOLOGY/ONCOLOGY | Facility: CLINIC | Age: 64
End: 2024-04-19
Payer: COMMERCIAL

## 2024-04-19 DIAGNOSIS — Z17.0 MALIGNANT NEOPLASM OF UPPER-OUTER QUADRANT OF LEFT BREAST IN FEMALE, ESTROGEN RECEPTOR POSITIVE: Primary | ICD-10-CM

## 2024-04-19 DIAGNOSIS — C50.412 MALIGNANT NEOPLASM OF UPPER-OUTER QUADRANT OF LEFT BREAST IN FEMALE, ESTROGEN RECEPTOR POSITIVE: Primary | ICD-10-CM

## 2024-04-19 NOTE — TELEPHONE ENCOUNTER
Called patient and conveyed oncotype results; given low score of 13, there is no benefit of adjuvant chemotherapy.  She can proceed with radiation once cleared by breast surgery followed by endocrine therapy.

## 2024-04-22 ENCOUNTER — CLINICAL SUPPORT (OUTPATIENT)
Dept: REHABILITATION | Facility: HOSPITAL | Age: 64
End: 2024-04-22
Attending: SURGERY
Payer: COMMERCIAL

## 2024-04-22 DIAGNOSIS — Z17.0 MALIGNANT NEOPLASM OF UPPER-OUTER QUADRANT OF LEFT BREAST IN FEMALE, ESTROGEN RECEPTOR POSITIVE: ICD-10-CM

## 2024-04-22 DIAGNOSIS — C50.412 MALIGNANT NEOPLASM OF UPPER-OUTER QUADRANT OF LEFT BREAST IN FEMALE, ESTROGEN RECEPTOR POSITIVE: ICD-10-CM

## 2024-04-22 PROCEDURE — 97161 PT EVAL LOW COMPLEX 20 MIN: CPT | Mod: PN

## 2024-04-22 PROCEDURE — 97535 SELF CARE MNGMENT TRAINING: CPT | Mod: PN

## 2024-04-22 NOTE — PLAN OF CARE
[facility]  Physical Therapy Initial Evaluation / Post Op Breast Cancer Evaluation    Date: 2024   Name: Joyce Velarde  Clinic Number: [id number]    Therapy Diagnosis:   Encounter Diagnosis   Name Primary?    Malignant neoplasm of upper-outer quadrant of left breast in female, estrogen receptor positive       Physician: Lynda Bhandari MD    Physician Orders: PT Eval and Treat   Medical Diagnosis: malignant neoplasm of left breast  Evaluation Date: 2024  Authorization Period Expiration: 2024  Plan of Care Certification Period: 7/15/2024  Visit # / Visits authorized:   FOTO: 1/3    Time In: 3:35 pm  Time Out: 4:35 pm  Total Billable Time: 40 minutes     Precautions: Standard      History   History of Present Illness: Joyce is a 64 y.o. female that presents to  [facility] at the [facility] s/p left lumpectomy and removal of 4 lymph nodes    Sx date: 3/20/2024  Chief complaint: pulling under left breast when raising arm overhead  Surgical History:  Joyce Velarde  has a past surgical history that includes  section; Bilateral tubal ligation; lumpectomy, with radar localization using sharron  (Left, 3/20/2024); mapping, lymph node, sentinel (Left, 3/20/2024); [place] lymph node biopsy (Left, 3/20/2024); and Injection for sentinel node identification (Left, 3/20/2024).    Chemotherapy: n/a  Radiation: will be having radiation treatments    Past Medical History:   Diagnosis Date    Cancer     GERD (gastroesophageal reflux disease)       Medications:  Joyce has a current medication list which includes the following prescription(s): ascorbic acid (vitamin c), b complex vitamins, ondansetron, phentermine, tramadol, and vitamin d.    Allergies:  Review of patient's allergies indicates:  No Known Allergies     Prior Therapy: none  Social History:  lives with their spouse  Durable Medical Equipment: -  Occupation:  "retired  Prior Level of Function: left shoulder impairment due to RCT  Current Level of Function: no deficits except for reaching back with left arm  Hand dominance: right    Patient's Goals: no impairments noted during exam; will return to therapy as needed for late effects of radiation    Subjective   Pt states:   Pain: 2/10 on VAS currently.   Best: 0/10  Worst: 2/10   Pain location: left breast when raising arm overhead     Objective   Mental status :alert    Postural examination/scapula alignment: Rounded shoulder    Skin Integrity:   Scar Location: below left axilla  Appearance: healed  Signs of infection: No      Edema: none noted  Location: --    Axillary Web Syndrome/Cording:   Location: none noted    Sensation: hypersensitivity noted left subscapularis, teres muscle; has "pulling" sensation when reaching left arm overhead (breast)       Range of Motion  Shoulder Range of Motion:   Active /Passive ROM Right Left   Flexion 180 170   Abduction 180 170   Extension wnl wnl   IR/90deg wnl wnl   ER/90deg wnl wnl          Strength: manual muscle test grades below   Upper Extremity Strength- WNL bilateral upper extremities     Baseline Measurements of BL UE's for early detection of Lymphedema:     LANDMARK RIGHT upper extremity  4/22/2024 LEFT upper extremity  4/22/2024 DIFFERENCE   E + 8" 33.5 cm 34 cm 0.5 cm   E + 6" 32.5 cm 33.5 cm 1 cm   E + 4" 32.5 cm 33.5 cm 1 cm   E + 2" - cm - cm - cm   Elbow 26 cm 26.5 cm 0.5 cm   W+ 8" 25.5 cm 26 cm 0.5 cm   W +  6" 25 cm 25 cm - cm   W + 4" - cm - cm - cm   Wrist 15.5 cm 15.5 cm - cm   DPC 19 cm 19 cm - cm   IP Thumb 6 cm 6 cm - cm     Functional Mobility (Bed mobility, transfers)  independent    ADL's:  independent    Gait Assessment:   - AD used: none  - Assistance: independent  - Distance: community distances     Patient Education Provided   - role of therapy in multi - disciplinary team, goals for therapy  - Pt was educated in lymphedema etiology and management " "plans.    -see self care/home management section below    Pt has no cultural, educational or language barriers to learning provided.      Treatment and Instruction of Home Exercise Program      Total Time Separate from Evaluation: 40 minutes    Education/Self-Care/Home Management provided: (40) minutes    Pt educated on role of therapy in multi - disciplinary team, goals for therapy       PROVIDED POST-OP BREAST CANCER HANDOUT AND REVIEWED THE FOLLOWING INFORMATION:  -axillary cording education  -lymphedema education and prevention  -radiation side effects  -provided exercises for left shoulder mobility: chicken wing in supine, supine active assisted range of motion shoulder flexion, open book, quadruped positioning + rock backs, modified downward facing dog (can be performed to maintain left shoulder range of motion during radiation treatments as tolerated)    Written Home Exercises Provided: yes.  Exercises were reviewed and Joyce was able to demonstrate them prior to the end of the session.  Joyce demonstrated good  understanding of the education provided.     See EMR under Patient Instructions for exercises provided 4/22/2024.      Functional Limitations Reporting      FUNCTION:  CMS Impairment/Limitation/Restriction for FOTO shoulder Survey    Therapist reviewed FOTO scores for Joyce Velarde on 4/22/2024  FOTO documents entered into [facility] - see Media section.    Functional Score: n/a% (pre op eval), 95% (post op eval)         Assessment   This is a 64 y.o. female referred to outpatient physical therapy and presents with a medical diagnosis of left breast cancer and was seen today post-operatively to establish PT plan of care for impairments following surgery including: no impairments noted other than a "pulling" sensation when reaching left arm overhead. Axillary cording not present. Swelling not present.     Pt instructed in HEP this session and was able to perform all exercises given " independently. Pt instructed to follow up with therapist if any concerns arise with program established. Pt will continue to benefit from skilled physical therapy to address the impairments stated in chart below, provide patient/family education and to maximize pt's level of independence in home and community environment     Pt prognosis is Excellent.    Anticipated barriers to physical therapy: will be receiving radiation treatments and will attend therapy after radiation if needed     Pt's spiritual, cultural and educational needs considered and pt agreeable to plan of care and goals as stated below:     Medical Necessity is demonstrated by the following  History  Co-morbidities and personal factors that may impact the plan of care [] LOW: no personal factors / co-morbidities  [x] MODERATE: 1-2 personal factors / co-morbidities  [] HIGH: 3+ personal factors / co-morbidities    Moderate / High Support Documentation:   Co-morbidities affecting plan of care:     Personal Factors:   no deficits     Examination  Body Structures and Functions, activity limitations and participation restrictions that may impact the plan of care [x] LOW: addressing 1-2 elements  [] MODERATE: 3+ elements  [] HIGH: 4+ elements (please support below)    Moderate / High Support Documentation:      Clinical Presentation [x] LOW: stable  [] MODERATE: Evolving  [] HIGH: Unstable     Decision Making/ Complexity Score: low         Goals: goals not set today due to no impairments observed      Plan   Plan of care Certification Period: 4/22/2024 to 7/15/2024.    Will return to therapy if needed after radiation treatments; it has not been determined when radiation treatments will begin. She was provided with a home program to perform as tolerated when radiation treatments begin.     Samina Vieira, PT, TORRE[facility]IL[facility]

## 2024-05-03 NOTE — PROGRESS NOTES
Breast Surgical Oncology  Post-operative Visit      REFERRING PHYSICIAN:  Pily, Primary Doctor    MEDICAL ONCOLOGIST:    Sandi Redman M.D.  RADIATION ONCOLOGIST:   DAKOTA    Date of Service: 05/03/2024    DIAGNOSIS:   This is a 64 y.o. 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  Final pathology showed 2.5 cm tumor with associated DCIS. Her margins are negative and 2/4 sentinel lymph nodes are positive for metastatic disease, including the clipped node.    Lab Results   Component Value Date    FPATHDX  03/20/2024     1. Left breast, partial mastectomy (13 grams):      -  Invasive ductal carcinoma with lobular features and associated I intermediate grade ductal          carcinoma in-situ (DCIS), see synoptic report      -  Vision biopsy clip present, slice 5      -  Reflector clip present, slice 5    2. Left breast, additional inferior margin:      -  Benign fibroadipose breast tissue      -  Negative for atypia or malignancy    3. Left breast, additional medial margin:      -  Benign fibroadipose breast tissue       -  Negative for atypia or malignancy    4. [place] lymph node #1 (hot and blue, target count 127), biopsy:      -  One lymph node, POSITIVE for metastatic carcinoma (1/1)      -  Cone shaped biopsy clip and reflector clip present      -  Largest metastatic deposit:  5 mm      -  Extracapsular extension:  Not definitively identified        -  Immunohistochemical stains for CAM 5.2, AE1/AE3 and wide spectrum keratin are positive           in tumor cells    5. [place] lymph node #2 (hot and blue, target count 113), biopsy:      -  One lymph node, negative for metastatic carcinoma (0/1)      -  Immunohistochemical stains for CAM 5.2, AE1/AE3 and wide spectrum keratin are  negative    6. [place] lymph node #3 (hot and blue, target count 815), biopsy:      -  One lymph node, POSITIVE for metastatic carcinoma (1/1)      -  Largest metastatic deposit:  1 mm      -  Extracapsular extension: Not identified      -  Immunohistochemical stains for CAM 5.2, AE1/AE3 and wide spectrum keratin are positive          in tumor cells    7. Non sentinel lymph node #1, biopsy:      -  One lymph node, negative for metastatic carcinoma (0/1)      SYNOPTIC REPORT    PROCEDURE:  Partial mastectomy  SPECIMEN LATERALITY:  Left breast  TUMOR SITE:  1 o'clock position  HISTOLOGIC TYPE:  Invasive ductal carcinoma with lobular features  HISTOLOGIC GRADE:  Grade 2 of 3        Tubule formation:  3        Nuclear pleomorphism:  2        Mitotic rate:  1  TUMOR SIZE:  2.5 x 1.6 x 1.2 cm (measured on glass slide)  TUMOR FOCALITY:  Single focus of invasive carcinoma  DUCTAL CARCINOMA IN-SITU (DCIS):  Present, positive for extensive intraductal component (EIC)         Size/extent:  Up to 2 cm focally, present in 4 of 14 breast tissue blocks, present in slice 3-6         Architectural pattern:  Solid and cribriform         Nuclear grade:  Intermediate (Grade II)         Necrosis:   Present, central/expansive   TUMOR EXTENT:  Not applicable (skin, nipple and skeletal muscle are absent)  LYMPHOVASCULAR INVASION:  Not identified  MICROCALCIFICATIONS:  Not identified  TREATMENT EFFECT:  No known pre-surgical therapy  MARGINS FOR INVASIVE CARCINOMA:        Medial:  POSITIVE in main specimen, negative in separately submitted additional margin                      (specimen #3)         Inferior:  2 mm on main specimen, negative in separately submitted additional lateral margin                        (specimen #2)         Lateral:  2 mm         Superior:  2 mm         Posterior/deep: 6 mm         Anterior:  Greater than 10 mm  MARGINS FOR DUCTAL CARCINOMA IN-SITU (DCIS):        Medial:  Less than 1 mm on main specimen,  negative in separately submitted additional margin                       (specimen #3)         Inferior:  Less than 1 mm on main specimen, negative in separately submitted additional                           lateral margin (specimen #2)         Lateral:  5 mm         Superior:  5 mm         Posterior/deep:  Greater than 10 mm         Anterior:  Less than 1 mm     REGIONAL LYMPH NODES:          Two of four total lymph nodes, POSITIVE for metastatic carcinoma ( 2/4)                    Number of sentinel lymph node submitted:  3                    Number of positive sentinel lymph nodes:  2                    Number of non sentinel lymph nodes submitted: 1                    Number positive nonsentinel lymph nodes:  0                    Largest metastatic deposit:  5 mm                    Extracapsular extension:  Not identified  BREAST BIOMARKERS (performed on patient's previous biopsy specimen [facility]-):            ER:  Positive (strong, 90-95%)            MO:  Positive (weak - intermediate, 20-30%)            Her2:  Negative (0)            Ki67:  40%    PATHOLOGIC STAGE CLASSIFICATION: pT2  pN1a         INTERVAL HISTORY:   Joyce Velarde comes in for a post-op check.  She denies fever, chills, chest pain or shortness of breath.  Her pain is well controlled.      Her axillary seroma has resolved post drainage. She now has increased inflammation in her surgical bed causing puckering of her periareolar incision.     MEDICATIONS:     Current Outpatient Medications   Medication Sig Dispense Refill    ascorbic acid, vitamin C, (VITAMIN C) 500 MG tablet Take 500 mg by mouth once daily.      b complex vitamins capsule Take 1 capsule by mouth once daily.      ondansetron (ZOFRAN-ODT) 8 MG TbDL Dissolve 1 tablet (8 mg total) by mouth every 8 (eight) hours as needed (Nausea). (Patient not taking: Reported on 4/4/2024) 12 tablet 2    phentermine (ADIPEX-P) 37.5 mg tablet Take 37.5 mg by mouth before breakfast.       traMADoL (ULTRAM) 50 mg tablet Take 1 tablet (50 mg total) by mouth every 6 (six) hours as needed for Pain. (Patient not taking: Reported on 4/4/2024) 15 tablet 0    vitamin D (VITAMIN D3) 1000 units Tab Take 1,000 Units by mouth once daily.       No current facility-administered medications for this visit.       ALLERGIES:   Review of patient's allergies indicates:  No Known Allergies    PHYSICAL EXAMINATION:   General:  This is a well appearing female with appropriate speech, affect and gait.     Breast:  left breast and axillary incision clean, dry, and intact, inflammation UOQ with puckering of her periareolar incision    ASSESSMENT:   The patient has had an uneventful postoperative course.    PLAN:   1. Return in 3 weeks for a follow up office visit and breast exam  2. The patient is advised in continued exam of the breast chest wall and to report to this office sooner should she note any areas of abnormality or concern.   3.  She has been instructed to meet with med onc for discussion of adjuvant treatment recommendations  4. She is instructed to take 400 mg ibuprofen BID for inflammation. She is not cleared for radiation until her inflammation has settled.     Lynda Bhandari M.D.

## 2024-05-06 ENCOUNTER — OFFICE VISIT (OUTPATIENT)
Dept: SURGERY | Facility: CLINIC | Age: 64
End: 2024-05-06
Payer: COMMERCIAL

## 2024-05-06 DIAGNOSIS — Z17.0 MALIGNANT NEOPLASM OF UPPER-OUTER QUADRANT OF LEFT BREAST IN FEMALE, ESTROGEN RECEPTOR POSITIVE: Primary | ICD-10-CM

## 2024-05-06 DIAGNOSIS — C50.412 MALIGNANT NEOPLASM OF UPPER-OUTER QUADRANT OF LEFT BREAST IN FEMALE, ESTROGEN RECEPTOR POSITIVE: Primary | ICD-10-CM

## 2024-05-06 PROCEDURE — 99024 POSTOP FOLLOW-UP VISIT: CPT | Mod: S$GLB,,, | Performed by: SURGERY

## 2024-05-14 ENCOUNTER — OFFICE VISIT (OUTPATIENT)
Dept: RADIATION ONCOLOGY | Facility: CLINIC | Age: 64
End: 2024-05-14
Payer: COMMERCIAL

## 2024-05-14 VITALS
BODY MASS INDEX: 28.56 KG/M2 | HEIGHT: 64 IN | SYSTOLIC BLOOD PRESSURE: 154 MMHG | RESPIRATION RATE: 18 BRPM | WEIGHT: 167.31 LBS | TEMPERATURE: 98 F | DIASTOLIC BLOOD PRESSURE: 84 MMHG | OXYGEN SATURATION: 98 % | HEART RATE: 77 BPM

## 2024-05-14 DIAGNOSIS — Z17.0 MALIGNANT NEOPLASM OF UPPER-OUTER QUADRANT OF LEFT BREAST IN FEMALE, ESTROGEN RECEPTOR POSITIVE: ICD-10-CM

## 2024-05-14 DIAGNOSIS — C50.412 MALIGNANT NEOPLASM OF UPPER-OUTER QUADRANT OF LEFT BREAST IN FEMALE, ESTROGEN RECEPTOR POSITIVE: ICD-10-CM

## 2024-05-14 PROCEDURE — 99999 PR PBB SHADOW E&M-EST. PATIENT-LVL IV: CPT | Mod: PBBFAC,,, | Performed by: SPECIALIST

## 2024-05-14 PROCEDURE — 1159F MED LIST DOCD IN RCRD: CPT | Mod: CPTII,S$GLB,, | Performed by: SPECIALIST

## 2024-05-14 PROCEDURE — 99205 OFFICE O/P NEW HI 60 MIN: CPT | Mod: S$GLB,,, | Performed by: SPECIALIST

## 2024-05-14 PROCEDURE — 3077F SYST BP >= 140 MM HG: CPT | Mod: CPTII,S$GLB,, | Performed by: SPECIALIST

## 2024-05-14 PROCEDURE — 3008F BODY MASS INDEX DOCD: CPT | Mod: CPTII,S$GLB,, | Performed by: SPECIALIST

## 2024-05-14 PROCEDURE — 3079F DIAST BP 80-89 MM HG: CPT | Mod: CPTII,S$GLB,, | Performed by: SPECIALIST

## 2024-05-14 NOTE — PROGRESS NOTES
[facility] / [facility] - Radiation Oncology Consult Note      HISTORY OF PRESENT ILLNESS:  64-year-old woman who underwent routine screening mammography on 02/15/2024 describing a focal asymmetry in the upper-outer quadrant of the left breast    Additional views on 02/20/2024 described a BI-RADS 4 0.8 x 0.6 x 0.6 cm mass in the upper-outer quadrant with a suspicious axillary lymph node measuring 0.7 x 0.7 x 0.6 cm with the eccentric cortical thickening up to 4 mm.    Core needle biopsy on 02/21/2024 recovered invasive mammary carcinoma, predicted moderately differentiated, measuring at least 7 mm in greatest dimension.  No in-situ disease or lymphovascular invasion.  Tumor was ER 90-95%, MD 20-30%, and HER2 0.  Ki 67 measured 40%.  Axillary biopsy recovered metastatic disease measuring at least 4.5 mm without definite NAVA.    CT of the chest abdomen and pelvis on 03/07/2024 showed the known breast malignancy with a suspicious small left axillary lymph node as previously demonstrated.  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  Other causes including idiopathic or lymphoma could not be ruled out    Bone scan on the same day was benign    At breast conserving surgery on 03/20/2024, a grade 2 2.5 x 1.6 x 1.2 cm tumor was recovered, resected with a positive medial margin in the main specimen with negative separately submitted margin, and all other margins measuring at least 2 mm.  No lymphovascular invasion appreciated.  Associated DCIS measured up to 2 cm focally, positive for EIC, grade 2, with 7 mm anterior margin, remaining margins at least 2 mm.  2/3 sentinel lymph nodes contained disease, measuring up to 5 mm without NAVA, and with 1 additional negative node.  pT2 N1a M0    Oncotype score is 13 and systemic therapy is not recommended      REVIEW OF SYSTEMS:  She reports  having tolerated surgery without unexpected difficulty and denies complaints worrisome for local regional or metastatic residual including wound breakdown, breast masses, nipple discharge, axillary other adenopathy, bony or other pain, headache, nausea, vomiting, vision changes, mental status changes, cough, shortness of breath, hemoptysis, abdominal bloating distention or pain, or GI or  complaints.  She maintains a active lifestyle without limitation in activities of daily living      GYN HISTORY:  Menarche at 15, , 1st pregnancy at 25, last menstrual period 50.  No history of HRT.      PAST MEDICAL HISTORY:  Past Medical History:   Diagnosis Date    Cancer     GERD (gastroesophageal reflux disease)        PAST SURGICAL HISTORY:  Past Surgical History:   Procedure Laterality Date    BILATERAL TUBAL LIGATION       SECTION      X 2    INJECTION FOR SENTINEL NODE IDENTIFICATION Left 3/20/2024    Procedure: INJECTION, FOR SENTINEL NODE IDENTIFICATION;  Surgeon: Lynda Bhandari MD;  Location: [facility];  Service: General;  Laterality: Left;    LUMPECTOMY, WITH RADAR LOCALIZATION USING RERE  Left 3/20/2024    Procedure: LUMPECTOMY,WITH RADAR LOCALIZATION USING RERE ;  Surgeon: Lynda Bhandari MD;  Location: [facility];  Service: General;  Laterality: Left;  Left Lumpectomy Using Preoperatively Placed Radiographic Marker (Rere )  Left Targeted Axillary Lymph Node Dissection  Injection of Isosulfan Blue Dye  Interpretation of Specimen Mammogram    MAPPING, LYMPH NODE, SENTINEL Left 3/20/2024    Procedure: MAPPING, LYMPH NODE, SENTINEL;  Surgeon: Lynda Bhandari MD;  Location: [facility] OR;  Service: General;  Laterality: Left;    SENTINEL LYMPH NODE BIOPSY Left 3/20/2024    Procedure: BIOPSY, LYMPH NODE, SENTINEL;  Surgeon: Lynda Bhandari MD;  Location: [facility];  Service: General;  Laterality: Left;       ALLERGIES:   Review of patient's allergies indicates:  No Known  "Allergies    MEDICATIONS:  Current Outpatient Medications   Medication Sig    ascorbic acid, vitamin C, (VITAMIN C) 500 MG tablet Take 500 mg by mouth once daily.    b complex vitamins capsule Take 1 capsule by mouth once daily.    traMADoL (ULTRAM) 50 mg tablet Take 1 tablet (50 mg total) by mouth every 6 (six) hours as needed for Pain. (Patient not taking: Reported on 4/4/2024)    vitamin D (VITAMIN D3) 1000 units Tab Take 1,000 Units by mouth once daily.     No current facility-administered medications for this visit.       SOCIAL HISTORY:  Social History     Socioeconomic History    Marital status:    Tobacco Use    Smoking status: Never    Smokeless tobacco: Never   Substance and Sexual Activity    Alcohol use: Yes     Comment: occ    Drug use: Never    Sexual activity: Yes       FAMILY HISTORY:  Family History   Problem Relation Name Age of Onset    Breast cancer Mother Nathalie 84        inflammatory    Hypertension Mother Nathalie     Dementia Mother Nathalie     Hyperlipidemia Mother Nathalie     Breast cancer Sister Teetee 43        genetic testing negative (~2015)    No Known Problems Brother Marcelino     No Known Problems Brother Juan     Lung cancer Maternal Grandmother          +smoking hx    Heart disease Maternal Grandmother      Heart disease Maternal Grandfather      Breast cancer Maternal Cousin Nilsa 53        genetic testing negative (2015)    Breast cancer Paternal Cousin      Breast cancer Other Zofia      Her mother developed inflammatory breast cancer 5, sister in her late 40s (HER2 positive) with negative genetic testing    PHYSICAL EXAMINATION:  Vitals:    05/14/24 0848   BP: (!) 154/84   Pulse: 77   Resp: 18   Temp: 97.6 °F (36.4 °C)   SpO2: 98%   Weight: 75.9 kg (167 lb 5.3 oz)   Height: 5' 4" (1.626 m)     General:  A&O x4, NAD   Head and neck:  PERRLA, EOM intact, cranial nerves 2-12 intact   Lymphatics: No cervical or supraclavicular adenopathy  Thoracic:  CTAB, RRR   Breasts:  Modest " Linnea areolar distortion postoperatively with subtle asymmetry and no nipple discharge dimpling or worrisome pigmentary change bilaterally.  Both breasts are soft and a nodular with negative axillae  Abdomen:  Soft nontender and nondistended, positive bowel sounds        KPS:  90    ASSESSMENT:  64-year-old otherwise healthy woman with a pT2 N1a M0 grade 2 2.5 x 1.6 x 1.2 cm tumor was recovered, resected with a positive medial margin in the main specimen with negative separately submitted margin, and all other margins measuring at least 2 mm.  No lymphovascular invasion appreciated.  Associated DCIS measured up to 2 cm focally, positive for EIC, grade 2, with 7 mm anterior margin, remaining margins at least 2 mm.  2/3 sentinel lymph nodes contained disease, measuring up to 5 mm without NAVA, and with 1 additional negative node.  She has good disease insight      PLAN:  Reviewed the nature of her circumstance and the role of radiotherapy in the setting of breast conservation, improving local regional control rates equivalent to those achieved with mastectomy.  We also reviewed the data supporting adjuvant therapy to the regional lymphatics, with an additional survival advantage.      We reviewed the logistics of therapy, including the planning and treatment visits, as well as possible acute and chronic side effects in great detail.      She voiced an understanding and a desire to proceed.  Informed written consent was obtained and she was given the original after scanning into the EMR    She will return at her convenience in the next week or so [facility] beginning treatment 7-10 days later.  I anticipate treating the whole breast and regional lymphatics to 50 Gy in 25 fractions, possibly 42.5 in 16 as allowed following DVH analysis.      She is also met with Dr. Redman and will not be receiving systemic therapy per Oncotype.  She is aware that an oral anti endocrine agent will likely  be recommended and is receptive to that plan    I spent approximately 60 minutes reviewing the available records and evaluating the patient, out of which over 50% of the time was spent face to face with the patient in counseling and coordinating this patient's care.

## 2024-05-16 ENCOUNTER — HOSPITAL ENCOUNTER (OUTPATIENT)
Dept: RADIOLOGY | Facility: HOSPITAL | Age: 64
Discharge: HOME | End: 2024-05-16
Attending: SPECIALIST
Payer: COMMERCIAL

## 2024-05-16 ENCOUNTER — HOSPITAL ENCOUNTER (OUTPATIENT)
Dept: RADIATION THERAPY | Facility: HOSPITAL | Age: 64
Discharge: HOME | End: 2024-05-16
Attending: SPECIALIST
Payer: COMMERCIAL

## 2024-05-16 PROCEDURE — 77263 THER RADIOLOGY TX PLNG CPLX: CPT | Mod: ,,, | Performed by: SPECIALIST

## 2024-05-16 PROCEDURE — 77014 HC CT GUIDANCE RADIATION THERAPY FLDS PLACEMENT: CPT | Mod: TC | Performed by: SPECIALIST

## 2024-05-16 PROCEDURE — 77334 RADIATION TREATMENT AID(S): CPT | Mod: TC | Performed by: SPECIALIST

## 2024-05-16 PROCEDURE — 77334 RADIATION TREATMENT AID(S): CPT | Mod: 26,,, | Performed by: SPECIALIST

## 2024-05-16 PROCEDURE — 77014 PR  CT GUIDANCE PLACEMENT RAD THERAPY FIELDS: CPT | Mod: 26,,, | Performed by: SPECIALIST

## 2024-05-24 PROCEDURE — 77293 RESPIRATOR MOTION MGMT SIMUL: CPT | Mod: 26,,, | Performed by: SPECIALIST

## 2024-05-24 PROCEDURE — 77301 RADIOTHERAPY DOSE PLAN IMRT: CPT | Mod: TC | Performed by: SPECIALIST

## 2024-05-24 PROCEDURE — 77293 RESPIRATOR MOTION MGMT SIMUL: CPT | Mod: TC | Performed by: SPECIALIST

## 2024-05-24 PROCEDURE — 77301 RADIOTHERAPY DOSE PLAN IMRT: CPT | Mod: 26,,, | Performed by: SPECIALIST

## 2024-05-27 ENCOUNTER — DOCUMENTATION ONLY (OUTPATIENT)
Dept: RADIATION ONCOLOGY | Facility: CLINIC | Age: 64
End: 2024-05-27
Payer: COMMERCIAL

## 2024-05-27 PROCEDURE — 77385 HC IMRT, SIMPLE: CPT | Performed by: SPECIALIST

## 2024-05-27 PROCEDURE — 77300 RADIATION THERAPY DOSE PLAN: CPT | Mod: 26,,, | Performed by: SPECIALIST

## 2024-05-27 PROCEDURE — 77338 DESIGN MLC DEVICE FOR IMRT: CPT | Mod: 26,,, | Performed by: SPECIALIST

## 2024-05-27 PROCEDURE — 77300 RADIATION THERAPY DOSE PLAN: CPT | Mod: TC | Performed by: SPECIALIST

## 2024-05-27 PROCEDURE — 77338 DESIGN MLC DEVICE FOR IMRT: CPT | Mod: TC | Performed by: SPECIALIST

## 2024-05-27 PROCEDURE — 77427 RADIATION TX MANAGEMENT X5: CPT | Mod: ,,, | Performed by: SPECIALIST

## 2024-05-27 PROCEDURE — 77014 PR  CT GUIDANCE PLACEMENT RAD THERAPY FIELDS: CPT | Mod: 26,,, | Performed by: SPECIALIST

## 2024-05-27 NOTE — PLAN OF CARE
Day 1 outpatient xrt to the left breast. Breast handout and verbal instructions given. Miaderm cream and contact info provided. Skin care and side effects reviewed. Patient verbalized understanding.

## 2024-05-28 PROCEDURE — 77385 HC IMRT, SIMPLE: CPT | Performed by: SPECIALIST

## 2024-05-28 PROCEDURE — 77014 PR  CT GUIDANCE PLACEMENT RAD THERAPY FIELDS: CPT | Mod: 26,,, | Performed by: SPECIALIST

## 2024-05-29 PROCEDURE — 77385 HC IMRT, SIMPLE: CPT | Performed by: SPECIALIST

## 2024-05-29 PROCEDURE — 77014 PR  CT GUIDANCE PLACEMENT RAD THERAPY FIELDS: CPT | Mod: 26,,, | Performed by: SPECIALIST

## 2024-05-30 PROCEDURE — 77385 HC IMRT, SIMPLE: CPT | Performed by: SPECIALIST

## 2024-05-30 PROCEDURE — 77014 PR  CT GUIDANCE PLACEMENT RAD THERAPY FIELDS: CPT | Mod: 26,,, | Performed by: SPECIALIST

## 2024-05-31 PROCEDURE — 77385 HC IMRT, SIMPLE: CPT | Performed by: SPECIALIST

## 2024-05-31 PROCEDURE — 77014 PR  CT GUIDANCE PLACEMENT RAD THERAPY FIELDS: CPT | Mod: 26,,, | Performed by: SPECIALIST

## 2024-06-03 ENCOUNTER — DOCUMENTATION ONLY (OUTPATIENT)
Dept: RADIATION ONCOLOGY | Facility: CLINIC | Age: 64
End: 2024-06-03
Payer: COMMERCIAL

## 2024-06-03 ENCOUNTER — OFFICE VISIT (OUTPATIENT)
Dept: HEMATOLOGY/ONCOLOGY | Facility: CLINIC | Age: 64
End: 2024-06-03
Payer: COMMERCIAL

## 2024-06-03 ENCOUNTER — HOSPITAL ENCOUNTER (OUTPATIENT)
Dept: RADIATION THERAPY | Facility: HOSPITAL | Age: 64
Discharge: HOME | End: 2024-06-03
Attending: SPECIALIST
Payer: COMMERCIAL

## 2024-06-03 VITALS
HEIGHT: 64 IN | BODY MASS INDEX: 29.02 KG/M2 | OXYGEN SATURATION: 97 % | HEART RATE: 72 BPM | WEIGHT: 170 LBS | DIASTOLIC BLOOD PRESSURE: 89 MMHG | SYSTOLIC BLOOD PRESSURE: 148 MMHG | TEMPERATURE: 98 F

## 2024-06-03 DIAGNOSIS — M85.80 OSTEOPENIA, UNSPECIFIED LOCATION: ICD-10-CM

## 2024-06-03 DIAGNOSIS — Z17.0 MALIGNANT NEOPLASM OF UPPER-OUTER QUADRANT OF LEFT BREAST IN FEMALE, ESTROGEN RECEPTOR POSITIVE: Primary | ICD-10-CM

## 2024-06-03 DIAGNOSIS — C50.412 MALIGNANT NEOPLASM OF UPPER-OUTER QUADRANT OF LEFT BREAST IN FEMALE, ESTROGEN RECEPTOR POSITIVE: Primary | ICD-10-CM

## 2024-06-03 PROCEDURE — 3008F BODY MASS INDEX DOCD: CPT | Mod: CPTII,S$GLB,, | Performed by: INTERNAL MEDICINE

## 2024-06-03 PROCEDURE — 77336 RADIATION PHYSICS CONSULT: CPT | Performed by: SPECIALIST

## 2024-06-03 PROCEDURE — 77014 PR  CT GUIDANCE PLACEMENT RAD THERAPY FIELDS: CPT | Mod: 26,,, | Performed by: SPECIALIST

## 2024-06-03 PROCEDURE — 99999 PR PBB SHADOW E&M-EST. PATIENT-LVL III: CPT | Mod: PBBFAC,,, | Performed by: INTERNAL MEDICINE

## 2024-06-03 PROCEDURE — 3079F DIAST BP 80-89 MM HG: CPT | Mod: CPTII,S$GLB,, | Performed by: INTERNAL MEDICINE

## 2024-06-03 PROCEDURE — 77385 HC IMRT, SIMPLE: CPT | Performed by: SPECIALIST

## 2024-06-03 PROCEDURE — 77427 RADIATION TX MANAGEMENT X5: CPT | Mod: ,,, | Performed by: SPECIALIST

## 2024-06-03 PROCEDURE — 3077F SYST BP >= 140 MM HG: CPT | Mod: CPTII,S$GLB,, | Performed by: INTERNAL MEDICINE

## 2024-06-03 PROCEDURE — 99214 OFFICE O/P EST MOD 30 MIN: CPT | Mod: S$GLB,,, | Performed by: INTERNAL MEDICINE

## 2024-06-03 NOTE — PLAN OF CARE
Day 6 outpatient xrt to the left breast. Tolerating therapy without change or complaint. Will continue to monitor.

## 2024-06-03 NOTE — PROGRESS NOTES
Patient ID: Joyce Velarde   Chief Complaint: Follow-up  MRN:  [id number]     Oncologic Diagnosis:    L Stage IIB (cT1b cN1 Mx) (pT2 pN1a M0) Breast Invasive Mammary Carcinoma, G2, +/+/-, ki-67 40%  DCIS, G2  L Stage IB (cT1b cN1 Mx) Breast Invasive Mammary Carcinoma, G2, +/+/-, ki-67 40%  Oncotype 13  Previous Treatment:  s/p BCS 03/20/24  Current Treatment:  Breast Radiation (05/27/2024 - )  Subjective   The patient presents for follow up.    We reviewed her final pathology and oncotype results which indicate no benefit of chemotherapy.  Given that she was node positive and a higher risk, low stage breast cancer we also discussed starting adjuvant abemaciclib after completion of radiation.  Information provided to her regarding this medication.    Review of Systems   Constitutional:  Negative for activity change, appetite change, chills, diaphoresis, fatigue, fever and unexpected weight change.   HENT:  Negative for nosebleeds.    Respiratory:  Negative for shortness of breath.    Cardiovascular:  Negative for chest pain.   Gastrointestinal:  Negative for abdominal distention, abdominal pain, anal bleeding, blood in stool, constipation, diarrhea, nausea and vomiting.   Genitourinary:  Negative for difficulty urinating and hematuria.   Musculoskeletal:  Negative for arthralgias, back pain and myalgias.   Skin:  Negative for rash.   Neurological:  Negative for dizziness, weakness, light-headedness and headaches.   Hematological:  Does not bruise/bleed easily.   Psychiatric/Behavioral:  The patient is not nervous/anxious.      History   Previous HPI  Joyce Velarde is a 64 y.o. female who presents to clinic to establish care on referral for newly diagnosed breast cancer.    The patient had left breast abnormality detected on screening mammogram in suspicious appearing lymph node.  Biopsy resulted as positive for invasive mammary carcinoma in the breast, and the lymph nodes positive as well.  The  patient denies any breast signs or symptoms.  She has a significant family history of malignancy in his already been referred to the genetic counselor.    She has no acute complaints today.  I reviewed her biopsy pathology as well as imaging.  Thankfully there is no signs of distant metastatic disease.  Discussed with her Oncotype DX testing and how it we will form a treatment decision.  I also discussed that depending on final pathology the test may be moot.  If she has other lymph nodes positive I will be recommending chemotherapy even if she has a lower Oncotype score.  She is in agreement to comply with whatever her treatment team recommends.      Oncology History   Malignant neoplasm of upper-outer quadrant of left breast in female, estrogen receptor positive   2024 Initial Diagnosis    Malignant neoplasm of upper-outer quadrant of left breast in female, estrogen receptor positive     2024 Cancer Staged    Staging form: Breast, AJCC 8th Edition  - Clinical stage from 2024: Stage IB (cT1b, cN1(f), cM0, G2, ER+, VT+, HER2-)     2024 Cancer Staged    Staging form: Breast, AJCC 8th Edition  - Pathologic stage from 2024: Stage IB (pT2, pN1(sn), cM0, G2, ER+, VT+, HER2-)         Past Surgical History:   Procedure Laterality Date    BILATERAL TUBAL LIGATION       SECTION      X 2    INJECTION FOR SENTINEL NODE IDENTIFICATION Left 3/20/2024    Procedure: INJECTION, FOR SENTINEL NODE IDENTIFICATION;  Surgeon: Lynda Bhandari MD;  Location: [facility];  Service: General;  Laterality: Left;    LUMPECTOMY, WITH RADAR LOCALIZATION USING RERE  Left 3/20/2024    Procedure: LUMPECTOMY,WITH RADAR LOCALIZATION USING RERE ;  Surgeon: Lynda Bhandari MD;  Location: [facility];  Service: General;  Laterality: Left;  Left Lumpectomy Using Preoperatively Placed Radiographic Marker (Rere )  Left Targeted Axillary Lymph Node Dissection  Injection of Isosulfan Blue Dye  Interpretation of  "Specimen Mammogram    MAPPING, LYMPH NODE, SENTINEL Left 3/20/2024    Procedure: MAPPING, LYMPH NODE, SENTINEL;  Surgeon: Lynda Bhandari MD;  Location: [facility] OR;  Service: General;  Laterality: Left;    SENTINEL LYMPH NODE BIOPSY Left 3/20/2024    Procedure: BIOPSY, LYMPH NODE, SENTINEL;  Surgeon: Lynda Bhandari MD;  Location: [facility] OR;  Service: General;  Laterality: Left;       Family History   Problem Relation Name Age of Onset    Breast cancer Mother Nathalie 84        inflammatory    Hypertension Mother Nathalie     Dementia Mother Nathalie     Hyperlipidemia Mother Nathalie     Breast cancer Sister Teetee 43        genetic testing negative (~2015)    No Known Problems Brother Marcelino     No Known Problems Brother Juan     Lung cancer Maternal Grandmother          +smoking hx    Heart disease Maternal Grandmother      Heart disease Maternal Grandfather      Breast cancer Maternal Cousin Nilsa 53        genetic testing negative (2015)    Breast cancer Paternal Cousin      Breast cancer Other Zofia        Review of patient's allergies indicates:  No Known Allergies    Social History     Tobacco Use    Smoking status: Never    Smokeless tobacco: Never   Substance Use Topics    Alcohol use: Yes     Comment: occ    Drug use: Never       Physical Exam   ECOG:   ECOG SCORE    0 - Fully active-able to carry on all pre-disease performance without restriction          Vitals:  BP (!) 148/89   Pulse 72   Temp 97.8 °F (36.6 °C) (Temporal)   Ht 5' 4" (1.626 m)   Wt 77.1 kg (169 lb 15.6 oz)   SpO2 97%   BMI 29.18 kg/m²     Physical Exam  Constitutional:       General: She is not in acute distress.     Appearance: Normal appearance. She is not ill-appearing.   HENT:      Head: Normocephalic and atraumatic.   Eyes:      Extraocular Movements: Extraocular movements intact.      Conjunctiva/sclera: Conjunctivae normal.   Cardiovascular:      Rate and Rhythm: Normal rate and regular rhythm.      Heart sounds: No murmur " heard.  Pulmonary:      Effort: Pulmonary effort is normal. No respiratory distress.   Abdominal:      Palpations: There is no hepatomegaly or splenomegaly.   Musculoskeletal:      Cervical back: Neck supple. No tenderness.   Skin:     Findings: No rash.   Neurological:      General: No focal deficit present.      Mental Status: She is alert and oriented to person, place, and time.   Psychiatric:         Mood and Affect: Mood normal.         Behavior: Behavior normal.         Thought Content: Thought content normal.        Labs   Labs:  No visits with results within 2 Day(s) from this visit.   Latest known visit with results is:   Lab Visit on 04/17/2024   Component Date Value Ref Range Status    Phosphorus 04/17/2024 4.5  2.7 - 4.5 mg/dL Final    Magnesium 04/17/2024 2.0  1.6 - 2.6 mg/dL Final    Sodium 04/17/2024 141  136 - 145 mmol/L Final    Potassium 04/17/2024 4.5  3.5 - 5.1 mmol/L Final    Chloride 04/17/2024 103  95 - 110 mmol/L Final    CO2 04/17/2024 28  23 - 29 mmol/L Final    Glucose 04/17/2024 99  70 - 110 mg/dL Final    BUN 04/17/2024 7 (L)  8 - 23 mg/dL Final    Creatinine 04/17/2024 0.7  0.5 - 1.4 mg/dL Final    Calcium 04/17/2024 10.3  8.7 - 10.5 mg/dL Final    Total Protein 04/17/2024 7.1  6.0 - 8.4 g/dL Final    Albumin 04/17/2024 4.1  3.5 - 5.2 g/dL Final    Total Bilirubin 04/17/2024 0.4  0.1 - 1.0 mg/dL Final    Comment: For infants and newborns, interpretation of results should be based  on gestational age, weight and in agreement with clinical  observations.    Premature Infant recommended reference ranges:  Up to 24 hours.............<8.0 mg/dL  Up to 48 hours............<12.0 mg/dL  3-5 days..................<15.0 mg/dL  6-29 days.................<15.0 mg/dL      Alkaline Phosphatase 04/17/2024 66  55 - 135 U/L Final    AST 04/17/2024 24  10 - 40 U/L Final    ALT 04/17/2024 24  10 - 44 U/L Final    eGFR 04/17/2024 >60  >60 mL/min/1.73 m^2 Final    Anion Gap 04/17/2024 10  8 - 16 mmol/L  Final    WBC 04/17/2024 5.22  3.90 - 12.70 K/uL Final    RBC 04/17/2024 4.70  4.00 - 5.40 M/uL Final    Hemoglobin 04/17/2024 13.5  12.0 - 16.0 g/dL Final    Hematocrit 04/17/2024 41.6  37.0 - 48.5 % Final    MCV 04/17/2024 89  82 - 98 fL Final    MCH 04/17/2024 28.7  27.0 - 31.0 pg Final    MCHC 04/17/2024 32.5  32.0 - 36.0 g/dL Final    RDW 04/17/2024 12.5  11.5 - 14.5 % Final    Platelets 04/17/2024 188  150 - 450 K/uL Final    MPV 04/17/2024 12.4  9.2 - 12.9 fL Final    Immature Granulocytes 04/17/2024 0.2  0.0 - 0.5 % Final    Gran # (ANC) 04/17/2024 3.1  1.8 - 7.7 K/uL Final    Immature Grans (Abs) 04/17/2024 0.01  0.00 - 0.04 K/uL Final    Comment: Mild elevation in immature granulocytes is non specific and   can be seen in a variety of conditions including stress response,   acute inflammation, trauma and pregnancy. Correlation with other   laboratory and clinical findings is essential.      Lymph # 04/17/2024 1.5  1.0 - 4.8 K/uL Final    Mono # 04/17/2024 0.4  0.3 - 1.0 K/uL Final    Eos # 04/17/2024 0.2  0.0 - 0.5 K/uL Final    Baso # 04/17/2024 0.03  0.00 - 0.20 K/uL Final    nRBC 04/17/2024 0  0 /100 WBC Final    Gran % 04/17/2024 59.9  38.0 - 73.0 % Final    Lymph % 04/17/2024 28.0  18.0 - 48.0 % Final    Mono % 04/17/2024 6.7  4.0 - 15.0 % Final    Eosinophil % 04/17/2024 4.6  0.0 - 8.0 % Final    Basophil % 04/17/2024 0.6  0.0 - 1.9 % Final    Differential Method 04/17/2024 Automated   Final        Pathology   Specimen to Pathology, Radiology Breast, needle biopsy - 02/21/2024      Component 3 wk ago   Final Pathologic Diagnosis     Part 1  Breast, left, 1 o'clock position, ultrasound-guided biopsy:  Invasive mammary carcinoma, moderately differentiated  [place] grade 2:  Tubule formation-3, nuclear pleomorphism-2 and mitotic count -1  Invasive carcinoma measures 7 mm in greatest dimension  No carcinoma in-situ or lymphovascular invasion is identified  All submitted cores are involved by invasive  carcinoma      Part 2  Axilla, left, ultrasound-guided biopsy:  Positive for metastatic carcinoma  Metastatic focus measures 4.5 mm in greatest dimension  Extranodal extension not definitively identified    ** tumor biomarkers and additional stains will be performed with results provided in a supplemental report.    This case was reviewed by Dr. RODNEY Hankins who concurs with the above diagnoses. VC      Comment: Interp By Geeta Saunders M.D., Signed on 02/26/2024 at 11:35   Supplemental Diagnosis     Tumor biomarkers  Estrogen receptor (ER): Positive, 90-95%, strong  Progesterone receptor (PGR): Positive, 20-30%, weak to intermediate  HER2 (IHC):  Negative, 0  Ki-67: 40%    Additional biomarkers:  AE1/AE3: Highlights the extent of invasive carcinoma  E cadherin: Displays strong membraneous staining, supporting ductal differentiation  P63:  Displays an absence of staining for myoepithelial cells, supporting the diagnosis of invasive carcinoma    All immunostains were performed with appropriate controls. VC      Gross     Pathology ID:  [id number]  Patient ID:  [id number]  Received in 2 parts:  Part 1:  Pathology ID:  [id number]  Patient ID:  [id number]  The specimen is received in formalin labeled &quot;left breast 1:00 in formalin 8:56&quot;.  The specimen consists of multiple yellow needle biopsy fragments measuring 1.7 x 1.1 x 0.1 cm in aggregate.  The specimen is submitted entirely in cassette  LBD--1-A.    Specimen has been in formalin for more than 6 hours and less than 72 hours.  Ischemic time is not provided.    Part 2:  Pathology ID:  [id number]  Patient ID:  [id number]  The specimen is received in formalin labeled &quot;left axilla in formalin 9:16&quot;.  The specimen consists of 5 tan needle biopsy fragments measuring 1.4 x 0.8 x 0.1 cm in aggregate.  The specimen is submitted entirely in cassette HZC--2-A.    Specimen has been in formalin for more than 6 hours and less than 72 hours.  Ischemic time is not  provided.  Raphael Lemus                Imaging   CT CHEST ABDOMEN PELVIS WITH IV CONTRAST (XPD) - 03/07/2024  TECHNIQUE:  Low dose axial images, sagittal and coronal reformations were obtained from the thoracic inlet to the pubic symphysis following the IV administration of 75 mL of Omnipaque 350 .  Oral contrast administered.  COMPARISON:  None     FINDINGS:  Chest:  Heart and great vessels: Mild atherosclerotic calcification  Adenopathy: No pathologically enlarged axillary, mediastinal or hilar lymph nodes.  Lungs: No concerning abnormality.  Abdomen:  Liver: Multiple small hypodensities, too small to characterize but likely cysts or hemangiomas.  Gallbladder and biliary: Unremarkable.  Spleen: Unremarkable.  Pancreas: Unremarkable.  Adrenals: Unremarkable.  Kidneys: Unremarkable.  Stomach/Bowel: Stomach is unremarkable.  Small bowel nonobstructive.  No concerning colonic abnormality.  Peritoneum: No ascites or pneumoperitoneum.  Abdominal Adenopathy: Central mesenteric fat stranding present with multiple small to upper limits of normal mesenteric lymph nodes.  Vasculature: Mild atherosclerotic plaquing present.  Pelvis:  Urinary bladder: Unremarkable.  Pelvis adenopathy: None.  Bones: No acute abnormality.  Well-circumscribed T3 lucent focus, suspected hemangioma.  Miscellaneous: Known left breast malignancy noted.  Biopsy clip noted within small left axillary lymph node as well.     Impression:  Known left breast malignancy without definite distant metastatic disease.  Central mesenteric fat stranding with small to upper limits normal lymph nodes.  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  Finding can also be idiopathic or associated with lymphoma and other entities. Lymphoma and other entities felt less likely.  Attention on follow-up.      NM BONE SCAN WHOLE BODY - 03/07/2024  CLINICAL HISTORY:  Breast cancer, invasive, stage  I/II/III, initial workup;  Secondary and unspecified malignant neoplasm of axilla and upper limb lymph nodes     TECHNIQUE:  Following the IV administration of 20 mCi of Tc-99m-MDP, anterior and posterior delayed whole body images were acquired.     COMPARISON:  None.     FINDINGS:  There is physiologic distribution of the radiopharmaceutical throughout the skeleton. Both kidneys and the bladder appear normal.     Impression:  There is no scintigraphic evidence of metastatic disease.    Assessment and Plan   L Stage IIB (cT1b cN1 Mx) (pT2 pN1a M0) Breast Invasive Mammary Carcinoma, G2, +/+/-, ki-67 40%  DCIS, G2  TB Presentation 03/07/2024: consensus for upfront surgery, genetic testing, systemic imaging, post op radiation and additional observation  Staging CT CAP and Bone Scan showed no evidence of metastatic disease  s/p L Breast Lumpectomy 03/20/2024:   Final pathology significant for tumor size 2.5 x 1.6 x 1.2 cm,  2/4 + LN, G2 DCIS  Discussed final pathology with significance for lymph node positivity and concern for high risk of recurrence. Oncotype Dx 13 with no benefit for chemotherapy.  Given low oncotype score it is reasonable to forego chemotherapy but I would recommend two years of adjuvant abemaciclib in combination with endocrine therapy after radiation      Osteopenia  Last DEXA 07/2022 showed osteopenia  Repeat DEXA July 2024  Recommended Ca/Vit D supplementation and likely bisphosphate given she is going to start AI      Cancer Screening  PAP Smear: Due and scheduled in May 2024  Colonoscopy 06/29/2020: patient reports she had a few polyps; not sure when she was due for another one; will refer to GI for colonoscopy after she has completed treatment for breast cancer        Med Onc Chart Routing      Follow up with physician 6 weeks.   Follow up with WILL    Infusion scheduling note    Injection scheduling note    Labs CBC, CMP, magnesium and phosphorus   Scheduling:  Preferred lab:  Lab interval:      Imaging    Pharmacy appointment    Other referrals              The patient was seen, interviewed and examined. Pertinent lab and radiologic studies were reviewed. Pt instructed to call should they develop concerning signs/symptoms or have further questions.        Portions of the record may have been created with voice recognition software. Occasional wrong-word or sound-a-like substitutions may have occurred due to the inherent limitations of voice recognition software. Read the chart carefully and recognize, using context, where substitutions have occurred.      Sandi Tran MD    Hematology/Oncology

## 2024-06-04 PROCEDURE — 77385 HC IMRT, SIMPLE: CPT | Performed by: SPECIALIST

## 2024-06-04 PROCEDURE — 77014 PR  CT GUIDANCE PLACEMENT RAD THERAPY FIELDS: CPT | Mod: 26,,, | Performed by: SPECIALIST

## 2024-06-05 PROCEDURE — 77385 HC IMRT, SIMPLE: CPT | Performed by: SPECIALIST

## 2024-06-05 PROCEDURE — 77014 PR  CT GUIDANCE PLACEMENT RAD THERAPY FIELDS: CPT | Mod: 26,,, | Performed by: SPECIALIST

## 2024-06-06 PROCEDURE — 77385 HC IMRT, SIMPLE: CPT | Performed by: SPECIALIST

## 2024-06-06 PROCEDURE — 77014 PR  CT GUIDANCE PLACEMENT RAD THERAPY FIELDS: CPT | Mod: 26,,, | Performed by: SPECIALIST

## 2024-06-07 PROCEDURE — 77014 PR  CT GUIDANCE PLACEMENT RAD THERAPY FIELDS: CPT | Mod: 26,,, | Performed by: SPECIALIST

## 2024-06-07 PROCEDURE — 77385 HC IMRT, SIMPLE: CPT | Performed by: SPECIALIST

## 2024-06-10 ENCOUNTER — DOCUMENTATION ONLY (OUTPATIENT)
Dept: RADIATION ONCOLOGY | Facility: CLINIC | Age: 64
End: 2024-06-10
Payer: COMMERCIAL

## 2024-06-10 PROCEDURE — 77427 RADIATION TX MANAGEMENT X5: CPT | Mod: ,,, | Performed by: SPECIALIST

## 2024-06-10 PROCEDURE — 77014 PR  CT GUIDANCE PLACEMENT RAD THERAPY FIELDS: CPT | Mod: 26,,, | Performed by: SPECIALIST

## 2024-06-10 PROCEDURE — 77385 HC IMRT, SIMPLE: CPT | Performed by: SPECIALIST

## 2024-06-10 NOTE — PLAN OF CARE
Day 11 outpatient xrt to the left breast. Tolerating therapy well with mild erythema noted. Will continue to monitor.

## 2024-06-11 PROCEDURE — 77385 HC IMRT, SIMPLE: CPT | Performed by: SPECIALIST

## 2024-06-11 PROCEDURE — 77014 PR  CT GUIDANCE PLACEMENT RAD THERAPY FIELDS: CPT | Mod: 26,,, | Performed by: SPECIALIST

## 2024-06-11 PROCEDURE — 77336 RADIATION PHYSICS CONSULT: CPT | Performed by: SPECIALIST

## 2024-06-12 PROCEDURE — 77385 HC IMRT, SIMPLE: CPT | Performed by: SPECIALIST

## 2024-06-12 PROCEDURE — 77014 PR  CT GUIDANCE PLACEMENT RAD THERAPY FIELDS: CPT | Mod: 26,,, | Performed by: SPECIALIST

## 2024-06-13 PROCEDURE — 77014 PR  CT GUIDANCE PLACEMENT RAD THERAPY FIELDS: CPT | Mod: 26,,, | Performed by: SPECIALIST

## 2024-06-13 PROCEDURE — 77385 HC IMRT, SIMPLE: CPT | Performed by: SPECIALIST

## 2024-06-14 PROCEDURE — 77014 PR  CT GUIDANCE PLACEMENT RAD THERAPY FIELDS: CPT | Mod: 26,,, | Performed by: SPECIALIST

## 2024-06-14 PROCEDURE — 77385 HC IMRT, SIMPLE: CPT | Performed by: SPECIALIST

## 2024-06-17 ENCOUNTER — DOCUMENTATION ONLY (OUTPATIENT)
Dept: RADIATION ONCOLOGY | Facility: CLINIC | Age: 64
End: 2024-06-17
Payer: COMMERCIAL

## 2024-06-17 PROCEDURE — 77336 RADIATION PHYSICS CONSULT: CPT | Performed by: SPECIALIST

## 2024-06-17 PROCEDURE — 77385 HC IMRT, SIMPLE: CPT | Performed by: SPECIALIST

## 2024-06-17 PROCEDURE — 77014 PR  CT GUIDANCE PLACEMENT RAD THERAPY FIELDS: CPT | Mod: 26,,, | Performed by: SPECIALIST

## 2024-06-17 NOTE — PLAN OF CARE
Day 16 outpatient xrt to the left breast. she denies complaint. Faint erythema on exam with intact skin throughout. Will continue to monitor.

## 2024-06-24 ENCOUNTER — DOCUMENTATION ONLY (OUTPATIENT)
Dept: RADIATION ONCOLOGY | Facility: CLINIC | Age: 64
End: 2024-06-24
Payer: COMMERCIAL

## 2024-06-24 NOTE — PLAN OF CARE
Day 20 outpatient xrt to the left breast.she describes mild discomfort consistent with modest erythema throughout, a bit worse in the axilla and inframammary fold, with increased folliculitis in the previously sun exposed upper inner quadrant. Using miaderm cream. Will continue to monitor.

## 2024-06-28 ENCOUNTER — TELEPHONE (OUTPATIENT)
Dept: HEMATOLOGY/ONCOLOGY | Facility: CLINIC | Age: 64
End: 2024-06-28
Payer: COMMERCIAL

## 2024-06-28 ENCOUNTER — DOCUMENTATION ONLY (OUTPATIENT)
Dept: RADIATION ONCOLOGY | Facility: CLINIC | Age: 64
End: 2024-06-28
Payer: COMMERCIAL

## 2024-06-28 NOTE — TELEPHONE ENCOUNTER
1:48 pm Nurse received a call from [facility] pharmacy regaring Verzenio prescription. Nurse spoke with pharmacist Norah, and Norah informed me that patient stated that she did not know whether she wanted to start taking medication. Norah stated that prescription was canceled until patient make can make a decision.       1:52 pm Nurse placed a call to patient regarding medication Verzenio. Voicemail left to return my call.

## 2024-06-28 NOTE — TELEPHONE ENCOUNTER
Patient returned my call, and stated that she would like to further discuss medication with Dr. Redman before making a decision on starting. Nurse informed patient that she will send Dr. Redman a message. All understanding verbalized. Call ended well.

## 2024-07-01 ENCOUNTER — HOSPITAL ENCOUNTER (OUTPATIENT)
Dept: RADIATION THERAPY | Facility: HOSPITAL | Age: 64
Discharge: HOME | End: 2024-07-01
Attending: SPECIALIST
Payer: COMMERCIAL

## 2024-07-01 PROCEDURE — 77336 RADIATION PHYSICS CONSULT: CPT | Performed by: SPECIALIST

## 2024-07-05 NOTE — PROGRESS NOTES
Breast Surgical Oncology  [place]    Date of Service: 07/05/2024    DIAGNOSIS:   64 y.o. female with a history of left breast cancer.    Date of Diagnosis: 2/21/24  Pathology: IDC, G2, ER 90-95%, CT 20-30%, HER2 0, Ki67 40%  Clinical Stage: IB (cT1b cN1 M0)  Pathologic Stage: IB (pT2 pN1 M0)    TREATMENT:   Surgery: left partial mastectomy with targeted axillary node biopsy on 3/20/24. Lynda Bhandari M.D. Breast Surgical Oncology  Systemic Therapy: Endocrine therapy not yet initiated, contemplating adjuvant Verzinio - TBD  Medical Oncologist: Sandi Redman M.D.    Radiation Treatment Summary: WBRT + RNI from 05/27/2024 to 06/28/2024   Radiation Oncologist: Diego Bernstein MD  Genetics: negative  Physical Therapy: prehabilitation     HISTORY OF PRESENT ILLNESS:   Joyce Velarde is here for oncological follow up.  Today, she denies new breast concerns such as pain, masses, skin changes, nipple discharge, nipple retraction or lumps under the arm.  She also denies bone pain, shortness of breath, abdominal pain and neurologic symptoms.     IMAGING:   No new     MEDICATIONS/ALLERGIES:     Current Outpatient Medications:     abemaciclib (VERZENIO) 150 mg Tab, Take 150 mg by mouth 2 (two) times daily., Disp: 60 tablet, Rfl: 11    ascorbic acid, vitamin C, (VITAMIN C) 500 MG tablet, Take 500 mg by mouth once daily., Disp: , Rfl:     b complex vitamins capsule, Take 1 capsule by mouth once daily., Disp: , Rfl:     traMADoL (ULTRAM) 50 mg tablet, Take 1 tablet (50 mg total) by mouth every 6 (six) hours as needed for Pain. (Patient not taking: Reported on 4/4/2024), Disp: 15 tablet, Rfl: 0    vitamin D (VITAMIN D3) 1000 units Tab, Take 1,000 Units by mouth once daily., Disp: , Rfl:   Review of patient's allergies indicates:  No Known Allergies    PHYSICAL EXAM:   General: The patient appears well and is in no acute distress.     Chaperone is present for examination.   BREAST EXAM  No Asymmetry  Right:  - Mass:  No  - Skin change: No  - Nipple Discharge: No  - Nipple retraction: No  - Axillary LAD: No  Left:   - Mass: No  - Skin change:post XRT changes  - Nipple Discharge: No  - Nipple retraction: No  - Axillary LAD: No      ASSESSMENT:   Diagnoses and all orders for this visit:    Malignant neoplasm of upper-outer quadrant of left breast in female, estrogen receptor positive         PLAN:   Joyce has a benign exam today without evidence of local or distant relapse.    She will return in November for her 6 month follow up visit, or sooner should she develop breast concerns.    Bilateral diagnostic mammogramwill be due no sooner than 6 months post radiation.     The patient is in agreement with the plan. Questions were encouraged and answered to patient's satisfaction. Joyce will call our office with any questions or concerns.     I spent a total of 30 minutes on this visit. This includes face to face time and non-face to face time preparing to see the patient (eg, review of tests), obtaining and/or reviewing separately obtained history, documenting clinical information in the electronic or other health record, independently interpreting results and communicating results to the patient/family/caregiver, or care coordinator.       Lynda Bhandari M.D.

## 2024-07-08 ENCOUNTER — OFFICE VISIT (OUTPATIENT)
Dept: SURGERY | Facility: CLINIC | Age: 64
End: 2024-07-08
Payer: COMMERCIAL

## 2024-07-08 DIAGNOSIS — C50.412 MALIGNANT NEOPLASM OF UPPER-OUTER QUADRANT OF LEFT BREAST IN FEMALE, ESTROGEN RECEPTOR POSITIVE: Primary | ICD-10-CM

## 2024-07-08 DIAGNOSIS — Z17.0 MALIGNANT NEOPLASM OF UPPER-OUTER QUADRANT OF LEFT BREAST IN FEMALE, ESTROGEN RECEPTOR POSITIVE: Primary | ICD-10-CM

## 2024-07-08 PROCEDURE — 99999 PR PBB SHADOW E&M-EST. PATIENT-LVL I: CPT | Mod: PBBFAC,,, | Performed by: SURGERY

## 2024-07-08 PROCEDURE — 99214 OFFICE O/P EST MOD 30 MIN: CPT | Mod: S$GLB,,, | Performed by: SURGERY

## 2024-07-10 ENCOUNTER — APPOINTMENT (OUTPATIENT)
Dept: RADIOLOGY | Facility: HOSPITAL | Age: 64
End: 2024-07-10
Attending: INTERNAL MEDICINE
Payer: COMMERCIAL

## 2024-07-10 DIAGNOSIS — M85.80 OSTEOPENIA, UNSPECIFIED LOCATION: ICD-10-CM

## 2024-07-10 PROCEDURE — 77080 DXA BONE DENSITY AXIAL: CPT | Mod: TC

## 2024-07-10 PROCEDURE — 77080 DXA BONE DENSITY AXIAL: CPT | Mod: 26,,, | Performed by: RADIOLOGY

## 2024-07-16 ENCOUNTER — LAB VISIT (OUTPATIENT)
Dept: LAB | Facility: HOSPITAL | Age: 64
End: 2024-07-16
Attending: INTERNAL MEDICINE
Payer: COMMERCIAL

## 2024-07-16 ENCOUNTER — OFFICE VISIT (OUTPATIENT)
Dept: HEMATOLOGY/ONCOLOGY | Facility: CLINIC | Age: 64
End: 2024-07-16
Payer: COMMERCIAL

## 2024-07-16 VITALS
HEIGHT: 64 IN | HEART RATE: 83 BPM | OXYGEN SATURATION: 97 % | BODY MASS INDEX: 28.79 KG/M2 | SYSTOLIC BLOOD PRESSURE: 149 MMHG | WEIGHT: 168.63 LBS | TEMPERATURE: 97 F | DIASTOLIC BLOOD PRESSURE: 89 MMHG

## 2024-07-16 DIAGNOSIS — C50.412 MALIGNANT NEOPLASM OF UPPER-OUTER QUADRANT OF LEFT BREAST IN FEMALE, ESTROGEN RECEPTOR POSITIVE: Primary | ICD-10-CM

## 2024-07-16 DIAGNOSIS — C77.3 MALIGNANT NEOPLASM METASTATIC TO LYMPH NODE OF AXILLA: ICD-10-CM

## 2024-07-16 DIAGNOSIS — Z79.811 AROMATASE INHIBITOR USE: ICD-10-CM

## 2024-07-16 DIAGNOSIS — Z17.0 MALIGNANT NEOPLASM OF UPPER-OUTER QUADRANT OF LEFT BREAST IN FEMALE, ESTROGEN RECEPTOR POSITIVE: Primary | ICD-10-CM

## 2024-07-16 LAB
ALBUMIN SERPL BCP-MCNC: 3.9 G/DL (ref 3.5–5.2)
ALP SERPL-CCNC: 66 U/L (ref 55–135)
ALT SERPL W/O P-5'-P-CCNC: 28 U/L (ref 10–44)
ANION GAP SERPL CALC-SCNC: 9 MMOL/L (ref 8–16)
AST SERPL-CCNC: 21 U/L (ref 10–40)
BASOPHILS # BLD AUTO: 0.02 K/UL (ref 0–0.2)
BASOPHILS NFR BLD: 0.4 % (ref 0–1.9)
BILIRUB SERPL-MCNC: 0.2 MG/DL (ref 0.1–1)
BUN SERPL-MCNC: 11 MG/DL (ref 8–23)
CALCIUM SERPL-MCNC: 9.6 MG/DL (ref 8.7–10.5)
CHLORIDE SERPL-SCNC: 105 MMOL/L (ref 95–110)
CO2 SERPL-SCNC: 24 MMOL/L (ref 23–29)
CREAT SERPL-MCNC: 0.8 MG/DL (ref 0.5–1.4)
DIFFERENTIAL METHOD BLD: ABNORMAL
EOSINOPHIL # BLD AUTO: 0.1 K/UL (ref 0–0.5)
EOSINOPHIL NFR BLD: 1.8 % (ref 0–8)
ERYTHROCYTE [DISTWIDTH] IN BLOOD BY AUTOMATED COUNT: 12.9 % (ref 11.5–14.5)
EST. GFR  (NO RACE VARIABLE): >60 ML/MIN/1.73 M^2
GLUCOSE SERPL-MCNC: 93 MG/DL (ref 70–110)
HCT VFR BLD AUTO: 38.6 % (ref 37–48.5)
HGB BLD-MCNC: 12.5 G/DL (ref 12–16)
IMM GRANULOCYTES # BLD AUTO: 0.02 K/UL (ref 0–0.04)
IMM GRANULOCYTES NFR BLD AUTO: 0.4 % (ref 0–0.5)
LYMPHOCYTES # BLD AUTO: 0.5 K/UL (ref 1–4.8)
LYMPHOCYTES NFR BLD: 10.8 % (ref 18–48)
MAGNESIUM SERPL-MCNC: 1.8 MG/DL (ref 1.6–2.6)
MCH RBC QN AUTO: 28.9 PG (ref 27–31)
MCHC RBC AUTO-ENTMCNC: 32.4 G/DL (ref 32–36)
MCV RBC AUTO: 89 FL (ref 82–98)
MONOCYTES # BLD AUTO: 0.4 K/UL (ref 0.3–1)
MONOCYTES NFR BLD: 8.7 % (ref 4–15)
NEUTROPHILS # BLD AUTO: 3.8 K/UL (ref 1.8–7.7)
NEUTROPHILS NFR BLD: 77.9 % (ref 38–73)
NRBC BLD-RTO: 0 /100 WBC
PHOSPHATE SERPL-MCNC: 4.4 MG/DL (ref 2.7–4.5)
PLATELET # BLD AUTO: 165 K/UL (ref 150–450)
PMV BLD AUTO: 11.8 FL (ref 9.2–12.9)
POTASSIUM SERPL-SCNC: 4.2 MMOL/L (ref 3.5–5.1)
PROT SERPL-MCNC: 6.6 G/DL (ref 6–8.4)
RBC # BLD AUTO: 4.33 M/UL (ref 4–5.4)
SODIUM SERPL-SCNC: 138 MMOL/L (ref 136–145)
WBC # BLD AUTO: 4.93 K/UL (ref 3.9–12.7)

## 2024-07-16 PROCEDURE — 3077F SYST BP >= 140 MM HG: CPT | Mod: CPTII,S$GLB,, | Performed by: INTERNAL MEDICINE

## 2024-07-16 PROCEDURE — 99214 OFFICE O/P EST MOD 30 MIN: CPT | Mod: S$GLB,,, | Performed by: INTERNAL MEDICINE

## 2024-07-16 PROCEDURE — 3008F BODY MASS INDEX DOCD: CPT | Mod: CPTII,S$GLB,, | Performed by: INTERNAL MEDICINE

## 2024-07-16 PROCEDURE — 3079F DIAST BP 80-89 MM HG: CPT | Mod: CPTII,S$GLB,, | Performed by: INTERNAL MEDICINE

## 2024-07-16 PROCEDURE — 85025 COMPLETE CBC W/AUTO DIFF WBC: CPT | Performed by: INTERNAL MEDICINE

## 2024-07-16 PROCEDURE — 99999 PR PBB SHADOW E&M-EST. PATIENT-LVL III: CPT | Mod: PBBFAC,,, | Performed by: INTERNAL MEDICINE

## 2024-07-16 PROCEDURE — 84100 ASSAY OF PHOSPHORUS: CPT | Performed by: INTERNAL MEDICINE

## 2024-07-16 PROCEDURE — 83735 ASSAY OF MAGNESIUM: CPT | Performed by: INTERNAL MEDICINE

## 2024-07-16 PROCEDURE — 36415 COLL VENOUS BLD VENIPUNCTURE: CPT | Performed by: INTERNAL MEDICINE

## 2024-07-16 PROCEDURE — 80053 COMPREHEN METABOLIC PANEL: CPT | Performed by: INTERNAL MEDICINE

## 2024-07-16 RX ORDER — ANASTROZOLE 1 MG/1
1 TABLET ORAL DAILY
Qty: 30 TABLET | Refills: 5 | Status: SHIPPED | OUTPATIENT
Start: 2024-07-16 | End: 2025-01-12

## 2024-07-16 NOTE — PROGRESS NOTES
Patient ID: Joyce Velarde   Chief Complaint: Follow-up  MRN:  [id number]     Oncologic Diagnosis:    L Stage IIB (cT1b cN1 Mx) (pT2 pN1a M0) Breast Invasive Mammary Carcinoma, G2, +/+/-, ki-67 40%  DCIS, G2  L Stage IB (cT1b cN1 Mx) Breast Invasive Mammary Carcinoma, G2, +/+/-, ki-67 40%  Oncotype 13  Previous Treatment:    s/p BCS 03/20/24  s/p Breast Radiation (05/27/2024 - 6/28/24)  Current Treatment:  Endocrine therapy (07/17/24 - ); pending patient decision on abemaciclib  Subjective   The patient presents for follow up.    I reviewed with her my recommendations to start endocrine therapy and abemaciclib. She is in agreement with starting endocrine therapy but is concerned about the side effects of Verzenio. We reviewed the risk vs benefits of Verzenio and I do recommend it given node positive disease.  I also discussed initiation of Prolia.  [facility] handout and NCCN patient guidelines for breast cancer provided.    Review of Systems   Constitutional:  Negative for activity change, appetite change, chills, diaphoresis, fatigue, fever and unexpected weight change.   HENT:  Negative for nosebleeds.    Respiratory:  Negative for shortness of breath.    Cardiovascular:  Negative for chest pain.   Gastrointestinal:  Negative for abdominal distention, abdominal pain, anal bleeding, blood in stool, constipation, diarrhea, nausea and vomiting.   Genitourinary:  Negative for difficulty urinating and hematuria.   Musculoskeletal:  Negative for arthralgias, back pain and myalgias.   Skin:  Negative for rash.   Neurological:  Negative for dizziness, weakness, light-headedness and headaches.   Hematological:  Does not bruise/bleed easily.   Psychiatric/Behavioral:  The patient is not nervous/anxious.      History   Previous HPI  Joyce Velarde is a 64 y.o. female who presents to clinic to establish care on referral for newly diagnosed breast cancer.    The patient had left breast abnormality detected on  screening mammogram in suspicious appearing lymph node.  Biopsy resulted as positive for invasive mammary carcinoma in the breast, and the lymph nodes positive as well.  The patient denies any breast signs or symptoms.  She has a significant family history of malignancy in his already been referred to the genetic counselor.    She has no acute complaints today.  I reviewed her biopsy pathology as well as imaging.  Thankfully there is no signs of distant metastatic disease.  Discussed with her Oncotype DX testing and how it we will form a treatment decision.  I also discussed that depending on final pathology the test may be moot.  If she has other lymph nodes positive I will be recommending chemotherapy even if she has a lower Oncotype score.  She is in agreement to comply with whatever her treatment team recommends.      Oncology History   Malignant neoplasm of upper-outer quadrant of left breast in female, estrogen receptor positive   2/25/2024 Initial Diagnosis    Malignant neoplasm of upper-outer quadrant of left breast in female, estrogen receptor positive     2/25/2024 Cancer Staged    Staging form: Breast, AJCC 8th Edition  - Clinical stage from 2/25/2024: Stage IB (cT1b, cN1(f), cM0, G2, ER+, RI+, HER2-)     4/1/2024 Cancer Staged    Staging form: Breast, AJCC 8th Edition  - Pathologic stage from 4/1/2024: Stage IB (pT2, pN1(sn), cM0, G2, ER+, RI+, HER2-)      Radiation Therapy    C50.412 - Malignant neoplasm of upper-outer quadrant of left female breast, Diagnosed 5/14/2024 (Active) Stage IB, T2, pN1a, M0, G2, HER2 Neg, ER Pos, RI P    64-year-old otherwise healthy woman with a pT2 N1a M0 grade 2 status post lumpectomy and sentinel lymph node biopsy, 2.5 x 1.6 x 1.2 cm tumor was recovered, resected with a positive medial margin in the main specimen with negative separately submitted margin, and all other margins measuring at least 2 mm.  No lymphovascular invasion appreciated.  Associated DCIS measured up  to 2 cm focally, positive for EIC, grade 2, with 7 mm anterior margin, remaining margins at least 2 mm.  2/3 sentinel lymph nodes contained disease, measuring up to 5 mm without NAVA, and with 1 additional negative node.  She has good disease insight      Treatment Summary she was treated to the whole left breast using an image guided 6 mV photon DIBH VMAT IMRT plan treating the chest wall and comprehensive regional lymphatics to 50 Gy in 25 fractions from 2024 to 2024         Past Surgical History:   Procedure Laterality Date    BILATERAL TUBAL LIGATION       SECTION      X 2    INJECTION FOR SENTINEL NODE IDENTIFICATION Left 3/20/2024    Procedure: INJECTION, FOR SENTINEL NODE IDENTIFICATION;  Surgeon: Lynda Bhandari MD;  Location: [facility] OR;  Service: General;  Laterality: Left;    LUMPECTOMY, WITH RADAR LOCALIZATION USING GEETA  Left 3/20/2024    Procedure: LUMPECTOMY,WITH RADAR LOCALIZATION USING GEETA ;  Surgeon: Lynda Bhandari MD;  Location: [facility] OR;  Service: General;  Laterality: Left;  Left Lumpectomy Using Preoperatively Placed Radiographic Marker (Geeta )  Left Targeted Axillary Lymph Node Dissection  Injection of Isosulfan Blue Dye  Interpretation of Specimen Mammogram    MAPPING, LYMPH NODE, SENTINEL Left 3/20/2024    Procedure: MAPPING, LYMPH NODE, SENTINEL;  Surgeon: Lynda Bhandari MD;  Location: [facility] OR;  Service: General;  Laterality: Left;    SENTINEL LYMPH NODE BIOPSY Left 3/20/2024    Procedure: BIOPSY, LYMPH NODE, SENTINEL;  Surgeon: Lynda Bhandari MD;  Location: [facility] OR;  Service: General;  Laterality: Left;       Family History   Problem Relation Name Age of Onset    Breast cancer Mother Nathalie 84        inflammatory    Hypertension Mother Nathalie     Dementia Mother Nathalie     Hyperlipidemia Mother Nathalie     Breast cancer Sister Teetee 43        genetic testing negative (~)    No Known Problems Brother Marcelino     No Known Problems  "Brother Juan     Lung cancer Maternal Grandmother          +smoking hx    Heart disease Maternal Grandmother      Heart disease Maternal Grandfather      Breast cancer Maternal Cousin Nilsa 53        genetic testing negative (2015)    Breast cancer Paternal Cousin      Breast cancer Other Zofia        Review of patient's allergies indicates:  No Known Allergies    Social History     Tobacco Use    Smoking status: Never    Smokeless tobacco: Never   Substance Use Topics    Alcohol use: Yes     Comment: occ    Drug use: Never       Physical Exam   ECOG:   ECOG SCORE    0 - Fully active-able to carry on all pre-disease performance without restriction          Vitals:  BP (!) 149/89   Pulse 83   Temp 97.3 °F (36.3 °C) (Temporal)   Ht 5' 4" (1.626 m)   Wt 76.5 kg (168 lb 10.4 oz)   SpO2 97%   BMI 28.95 kg/m²     Physical Exam  Constitutional:       General: She is not in acute distress.     Appearance: Normal appearance. She is not ill-appearing.   HENT:      Head: Normocephalic and atraumatic.   Eyes:      Extraocular Movements: Extraocular movements intact.      Conjunctiva/sclera: Conjunctivae normal.   Cardiovascular:      Rate and Rhythm: Normal rate and regular rhythm.      Heart sounds: No murmur heard.  Pulmonary:      Effort: Pulmonary effort is normal. No respiratory distress.   Abdominal:      Palpations: There is no hepatomegaly or splenomegaly.   Musculoskeletal:      Cervical back: Neck supple. No tenderness.   Skin:     Findings: No rash.   Neurological:      General: No focal deficit present.      Mental Status: She is alert and oriented to person, place, and time.   Psychiatric:         Mood and Affect: Mood normal.         Behavior: Behavior normal.         Thought Content: Thought content normal.        Labs   Labs:  Lab Visit on 07/16/2024   Component Date Value Ref Range Status    Phosphorus 07/16/2024 4.4  2.7 - 4.5 mg/dL Final    WBC 07/16/2024 4.93  3.90 - 12.70 K/uL Final    RBC 07/16/2024 " 4.33  4.00 - 5.40 M/uL Final    Hemoglobin 07/16/2024 12.5  12.0 - 16.0 g/dL Final    Hematocrit 07/16/2024 38.6  37.0 - 48.5 % Final    MCV 07/16/2024 89  82 - 98 fL Final    MCH 07/16/2024 28.9  27.0 - 31.0 pg Final    MCHC 07/16/2024 32.4  32.0 - 36.0 g/dL Final    RDW 07/16/2024 12.9  11.5 - 14.5 % Final    Platelets 07/16/2024 165  150 - 450 K/uL Final    MPV 07/16/2024 11.8  9.2 - 12.9 fL Final    Immature Granulocytes 07/16/2024 0.4  0.0 - 0.5 % Final    Gran # (ANC) 07/16/2024 3.8  1.8 - 7.7 K/uL Final    Immature Grans (Abs) 07/16/2024 0.02  0.00 - 0.04 K/uL Final    Comment: Mild elevation in immature granulocytes is non specific and   can be seen in a variety of conditions including stress response,   acute inflammation, trauma and pregnancy. Correlation with other   laboratory and clinical findings is essential.      Lymph # 07/16/2024 0.5 (L)  1.0 - 4.8 K/uL Final    Mono # 07/16/2024 0.4  0.3 - 1.0 K/uL Final    Eos # 07/16/2024 0.1  0.0 - 0.5 K/uL Final    Baso # 07/16/2024 0.02  0.00 - 0.20 K/uL Final    nRBC 07/16/2024 0  0 /100 WBC Final    Gran % 07/16/2024 77.9 (H)  38.0 - 73.0 % Final    Lymph % 07/16/2024 10.8 (L)  18.0 - 48.0 % Final    Mono % 07/16/2024 8.7  4.0 - 15.0 % Final    Eosinophil % 07/16/2024 1.8  0.0 - 8.0 % Final    Basophil % 07/16/2024 0.4  0.0 - 1.9 % Final    Differential Method 07/16/2024 Automated   Final        Pathology   Specimen to Pathology, Radiology Breast, needle biopsy - 02/21/2024      Component 3 wk ago   Final Pathologic Diagnosis     Part 1  Breast, left, 1 o'clock position, ultrasound-guided biopsy:  Invasive mammary carcinoma, moderately differentiated  Kelin grade 2:  Tubule formation-3, nuclear pleomorphism-2 and mitotic count -1  Invasive carcinoma measures 7 mm in greatest dimension  No carcinoma in-situ or lymphovascular invasion is identified  All submitted cores are involved by invasive carcinoma      Part 2  Axilla, left, ultrasound-guided  biopsy:  Positive for metastatic carcinoma  Metastatic focus measures 4.5 mm in greatest dimension  Extranodal extension not definitively identified    ** tumor biomarkers and additional stains will be performed with results provided in a supplemental report.    This case was reviewed by Dr. RODNEY Hankins who concurs with the above diagnoses. VC      Comment: Interp By Geeta Saunders M.D., Signed on 02/26/2024 at 11:35   Supplemental Diagnosis     Tumor biomarkers  Estrogen receptor (ER): Positive, 90-95%, strong  Progesterone receptor (PGR): Positive, 20-30%, weak to intermediate  HER2 (IHC):  Negative, 0  Ki-67: 40%    Additional biomarkers:  AE1/AE3: Highlights the extent of invasive carcinoma  E cadherin: Displays strong membraneous staining, supporting ductal differentiation  P63:  Displays an absence of staining for myoepithelial cells, supporting the diagnosis of invasive carcinoma    All immunostains were performed with appropriate controls. VC      Gross     Pathology ID:  [id number]  Patient ID:  [id number]  Received in 2 parts:  Part 1:  Pathology ID:  [id number]  Patient ID:  [id number]  The specimen is received in formalin labeled &quot;left breast 1:00 in formalin 8:56&quot;.  The specimen consists of multiple yellow needle biopsy fragments measuring 1.7 x 1.1 x 0.1 cm in aggregate.  The specimen is submitted entirely in cassette  ZDQ--1-A.    Specimen has been in formalin for more than 6 hours and less than 72 hours.  Ischemic time is not provided.    Part 2:  Pathology ID:  [id number]  Patient ID:  [id number]  The specimen is received in formalin labeled &quot;left axilla in formalin 9:16&quot;.  The specimen consists of 5 tan needle biopsy fragments measuring 1.4 x 0.8 x 0.1 cm in aggregate.  The specimen is submitted entirely in cassette JQQ--2-A.    Specimen has been in formalin for more than 6 hours and less than 72 hours.  Ischemic time is not provided.  Raphael Lemus                Imaging   CT  CHEST ABDOMEN PELVIS WITH IV CONTRAST (XPD) - 03/07/2024  TECHNIQUE:  Low dose axial images, sagittal and coronal reformations were obtained from the thoracic inlet to the pubic symphysis following the IV administration of 75 mL of Omnipaque 350 .  Oral contrast administered.  COMPARISON:  None     FINDINGS:  Chest:  Heart and great vessels: Mild atherosclerotic calcification  Adenopathy: No pathologically enlarged axillary, mediastinal or hilar lymph nodes.  Lungs: No concerning abnormality.  Abdomen:  Liver: Multiple small hypodensities, too small to characterize but likely cysts or hemangiomas.  Gallbladder and biliary: Unremarkable.  Spleen: Unremarkable.  Pancreas: Unremarkable.  Adrenals: Unremarkable.  Kidneys: Unremarkable.  Stomach/Bowel: Stomach is unremarkable.  Small bowel nonobstructive.  No concerning colonic abnormality.  Peritoneum: No ascites or pneumoperitoneum.  Abdominal Adenopathy: Central mesenteric fat stranding present with multiple small to upper limits of normal mesenteric lymph nodes.  Vasculature: Mild atherosclerotic plaquing present.  Pelvis:  Urinary bladder: Unremarkable.  Pelvis adenopathy: None.  Bones: No acute abnormality.  Well-circumscribed T3 lucent focus, suspected hemangioma.  Miscellaneous: Known left breast malignancy noted.  Biopsy clip noted within small left axillary lymph node as well.     Impression:  Known left breast malignancy without definite distant metastatic disease.  Central mesenteric fat stranding with small to upper limits normal lymph nodes.  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  Finding can also be idiopathic or associated with lymphoma and other entities. Lymphoma and other entities felt less likely.  Attention on follow-up.      NM BONE SCAN WHOLE BODY - 03/07/2024  CLINICAL HISTORY:  Breast cancer, invasive, stage I/II/III, initial workup;  Secondary and unspecified  malignant neoplasm of axilla and upper limb lymph nodes     TECHNIQUE:  Following the IV administration of 20 mCi of Tc-99m-MDP, anterior and posterior delayed whole body images were acquired.     COMPARISON:  None.     FINDINGS:  There is physiologic distribution of the radiopharmaceutical throughout the skeleton. Both kidneys and the bladder appear normal.     Impression:  There is no scintigraphic evidence of metastatic disease.    Assessment and Plan   L Stage IIB (cT1b cN1 Mx) (pT2 pN1a M0) Breast Invasive Mammary Carcinoma, G2, +/+/-, ki-67 40%  DCIS, G2  TB Presentation 03/07/2024: consensus for upfront surgery, genetic testing, systemic imaging, post op radiation and additional observation  Staging CT CAP and Bone Scan showed no evidence of metastatic disease  s/p L Breast Lumpectomy 03/20/2024:   Final pathology significant for tumor size 2.5 x 1.6 x 1.2 cm,  2/4 + LN, G2 DCIS  Discussed final pathology with significance for lymph node positivity and concern for high risk of recurrence. Oncotype Dx 13 with no benefit for chemotherapy.  Given low oncotype score it is reasonable to forego chemotherapy but I would recommend two years of adjuvant abemaciclib in combination with endocrine therapy after radiation given lymph node positive disease.  Patient has opted to start endocrine therapy but would like to research Verzenio further prior to finalizing her decision.  I also recommend we start Prolia which she will continue; educational resources provided.      Osteopenia  Last DEXA 07/2022 showed osteopenia  Repeat DEXA July 2024 showed osteopenia  Recommended Ca/Vit D supplementation and bisphosphate given she is going to start AI  Recommend we start Prolia; she reports recent dental exam      Cancer Screening  PAP Smear: Due and scheduled in May 2024  Colonoscopy 06/29/2020: patient reports she had a few polyps; not sure when she was due for another one; will refer to GI for colonoscopy after she has  completed treatment for breast cancer        Med Onc Chart Routing      Follow up with physician 3 months.   Follow up with WILL    Infusion scheduling note   Prolia in 3 weeks   Injection scheduling note    Labs CBC, CMP, magnesium and phosphorus   Scheduling:  Preferred lab:  Lab interval:     Imaging    Pharmacy appointment    Other referrals                  The patient was seen, interviewed and examined. Pertinent lab and radiologic studies were reviewed. Pt instructed to call should they develop concerning signs/symptoms or have further questions.        Portions of the record may have been created with voice recognition software. Occasional wrong-word or sound-a-like substitutions may have occurred due to the inherent limitations of voice recognition software. Read the chart carefully and recognize, using context, where substitutions have occurred.      Sandi Tran MD    Hematology/Oncology

## 2024-07-31 ENCOUNTER — OFFICE VISIT (OUTPATIENT)
Dept: RADIATION ONCOLOGY | Facility: CLINIC | Age: 64
End: 2024-07-31
Payer: COMMERCIAL

## 2024-07-31 VITALS
HEIGHT: 64 IN | SYSTOLIC BLOOD PRESSURE: 134 MMHG | OXYGEN SATURATION: 99 % | RESPIRATION RATE: 16 BRPM | HEART RATE: 67 BPM | TEMPERATURE: 98 F | DIASTOLIC BLOOD PRESSURE: 79 MMHG | WEIGHT: 170.44 LBS | BODY MASS INDEX: 29.1 KG/M2

## 2024-07-31 DIAGNOSIS — C50.412 MALIGNANT NEOPLASM OF UPPER-OUTER QUADRANT OF LEFT BREAST IN FEMALE, ESTROGEN RECEPTOR POSITIVE: Primary | ICD-10-CM

## 2024-07-31 DIAGNOSIS — Z17.0 MALIGNANT NEOPLASM OF UPPER-OUTER QUADRANT OF LEFT BREAST IN FEMALE, ESTROGEN RECEPTOR POSITIVE: Primary | ICD-10-CM

## 2024-07-31 PROCEDURE — 99999 PR PBB SHADOW E&M-EST. PATIENT-LVL III: CPT | Mod: PBBFAC,,, | Performed by: SPECIALIST

## 2024-07-31 NOTE — PROGRESS NOTES
"[facility] / [facility] - Radiation Oncology Follow Up Note    HISTORY OF PRESENT ILLNESS:  C50.412:  64-year-old otherwise healthy woman with a pT2 N1a M0 grade 2 ER LA positive HER2 negative upper-outer left breast cancer status post breast conservation recovering a 2.5 cm lesion resected with positive medial margin on the main specimen but negative separately submitted margin, and all other margins at least 2 mm.  No LVI.  Associated DCIS measured up to 2 cm focally, positive for EIC, grade 2, resected with negative margin.  2/3 sentinel lymph nodes contained disease measuring up to 5 mm without NAVA, and with 1 additional negative node.     She completed and regional radiotherapy on 06/28/2024    INTERVAL HISTORY:  She returns for routine one-month follow-up.  She had tolerated therapy well, developing mild-to-moderate dose appropriate skin change controlled with topicals, with intact skin through the final day of treatment.  Today, she reports having gone on to experience a rapid and near-complete resolution of her acute changes, today denying complaints or limitation in activities of daily living.  She denies signs or symptoms worrisome for local regional or metastatic recurrence     Arimidex is underway and well tolerated      PHYSICAL EXAMINATION:  Vitals:    07/31/24 0849   BP: 134/79   Pulse: 67   Resp: 16   Temp: 97.6 °F (36.4 °C)   SpO2: 99%   Weight: 77.3 kg (170 lb 6.7 oz)   Height: 5' 4" (1.626 m)      General:  A&O x4, NAD  Lungs:  CTAB  Heart:  RRR  Breasts:  Near perfect symmetry with modest mixed erythema and hyperpigmentation.  No nipple discharge dimpling or worrisome pigmentary change bilaterally.  The left breast is soft and a nodular with a negative axilla        ASSESSMENT:  She has recovered appropriately and remains clinically RICKIE with the Arimidex underway      PLAN:  Follow up in 6 months.  She has surgical follow up in November, and also maintains follow up with " Dr. Redman in Medical Oncology.  We reviewed the importance of regular follow up, regular mammography, self exam, and the advantage of Arimidex for both improved local control and reduced risk of future breast cancer.  She voiced an understanding

## 2024-08-08 ENCOUNTER — TELEPHONE (OUTPATIENT)
Dept: HEMATOLOGY/ONCOLOGY | Facility: CLINIC | Age: 64
End: 2024-08-08
Payer: COMMERCIAL

## 2024-08-19 ENCOUNTER — PATIENT MESSAGE (OUTPATIENT)
Dept: SURGERY | Facility: CLINIC | Age: 64
End: 2024-08-19
Payer: COMMERCIAL

## 2024-09-03 NOTE — PROGRESS NOTES
Interdisciplinary Breast Cancer Conference    Joyce Velarde    Female    Date Presented to Tumor Board: 04/04/24    Presenting Hospital / Clinic: [facility]    Tumor Laterality: Left    Breast Tumor Site: UOQ    Presenter: Lynda Bhandari MD    Reason for Consultation: Follow-Up from Prior Tumor Board    Specialties Present: Medical Oncology;Hematology;Radiation Oncology;Surgical Oncology;Pathology;Navigation;Research;Genetics;Radiology;Physical / Occupational Therapy    Patient Status: a current patient    Treatment to Date: Genomic Testing;Adjuvant Radiation    Clinical Trial Eligibility: Not discussed    ER: Positive    CO: Positive    Her2: Negative    Cancer Staging   Malignant neoplasm of upper-outer quadrant of left breast in female, estrogen receptor positive  Staging form: Breast, AJCC 8th Edition  - Clinical stage from 2/25/2024: Stage IB (cT1b, cN1(f), cM0, G2, ER+, CO+, HER2-) - Signed by Lynda Bhandari MD on 2/25/2024  - Pathologic stage from 4/1/2024: Stage IB (pT2, pN1(sn), cM0, G2, ER+, CO+, HER2-) - Signed by Lynda Bhandari MD on 4/1/2024      Recommended Plan: Post-operative radiation therapy;Genomic Testing  Surgical path reviewed, medical oncology requests oncotype- results pending, breast radiation recommended to follow chemotherapy if given.     Metastatic Site(s): Other    Presentation at Cancer Conference: Prospective                 ambulatory

## 2024-10-04 ENCOUNTER — PATIENT MESSAGE (OUTPATIENT)
Dept: INFUSION THERAPY | Facility: HOSPITAL | Age: 64
End: 2024-10-04
Payer: COMMERCIAL

## 2024-10-22 ENCOUNTER — LAB VISIT (OUTPATIENT)
Dept: LAB | Facility: HOSPITAL | Age: 64
End: 2024-10-22
Attending: INTERNAL MEDICINE
Payer: COMMERCIAL

## 2024-10-22 ENCOUNTER — OFFICE VISIT (OUTPATIENT)
Dept: HEMATOLOGY/ONCOLOGY | Facility: CLINIC | Age: 64
End: 2024-10-22
Payer: COMMERCIAL

## 2024-10-22 VITALS
WEIGHT: 165.88 LBS | BODY MASS INDEX: 28.32 KG/M2 | HEIGHT: 64 IN | TEMPERATURE: 97 F | SYSTOLIC BLOOD PRESSURE: 133 MMHG | HEART RATE: 72 BPM | DIASTOLIC BLOOD PRESSURE: 84 MMHG

## 2024-10-22 DIAGNOSIS — C50.412 MALIGNANT NEOPLASM OF UPPER-OUTER QUADRANT OF LEFT BREAST IN FEMALE, ESTROGEN RECEPTOR POSITIVE: Primary | ICD-10-CM

## 2024-10-22 DIAGNOSIS — Z17.0 MALIGNANT NEOPLASM OF UPPER-OUTER QUADRANT OF LEFT BREAST IN FEMALE, ESTROGEN RECEPTOR POSITIVE: Primary | ICD-10-CM

## 2024-10-22 DIAGNOSIS — C77.3 MALIGNANT NEOPLASM METASTATIC TO LYMPH NODE OF AXILLA: ICD-10-CM

## 2024-10-22 DIAGNOSIS — Z79.811 AROMATASE INHIBITOR USE: ICD-10-CM

## 2024-10-22 LAB
ALBUMIN SERPL BCP-MCNC: 4 G/DL (ref 3.5–5.2)
ALP SERPL-CCNC: 68 U/L (ref 40–150)
ALT SERPL W/O P-5'-P-CCNC: 18 U/L (ref 10–44)
ANION GAP SERPL CALC-SCNC: 8 MMOL/L (ref 8–16)
AST SERPL-CCNC: 21 U/L (ref 10–40)
BASOPHILS # BLD AUTO: 0.03 K/UL (ref 0–0.2)
BASOPHILS NFR BLD: 0.6 % (ref 0–1.9)
BILIRUB SERPL-MCNC: 0.3 MG/DL (ref 0.1–1)
BUN SERPL-MCNC: 16 MG/DL (ref 8–23)
CALCIUM SERPL-MCNC: 9.5 MG/DL (ref 8.7–10.5)
CHLORIDE SERPL-SCNC: 106 MMOL/L (ref 95–110)
CO2 SERPL-SCNC: 25 MMOL/L (ref 23–29)
CREAT SERPL-MCNC: 0.8 MG/DL (ref 0.5–1.4)
DIFFERENTIAL METHOD BLD: ABNORMAL
EOSINOPHIL # BLD AUTO: 0.2 K/UL (ref 0–0.5)
EOSINOPHIL NFR BLD: 3.7 % (ref 0–8)
ERYTHROCYTE [DISTWIDTH] IN BLOOD BY AUTOMATED COUNT: 12.1 % (ref 11.5–14.5)
EST. GFR  (NO RACE VARIABLE): >60 ML/MIN/1.73 M^2
GLUCOSE SERPL-MCNC: 99 MG/DL (ref 70–110)
HCT VFR BLD AUTO: 36.3 % (ref 37–48.5)
HGB BLD-MCNC: 11.8 G/DL (ref 12–16)
IMM GRANULOCYTES # BLD AUTO: 0.03 K/UL (ref 0–0.04)
IMM GRANULOCYTES NFR BLD AUTO: 0.6 % (ref 0–0.5)
LYMPHOCYTES # BLD AUTO: 0.8 K/UL (ref 1–4.8)
LYMPHOCYTES NFR BLD: 16.2 % (ref 18–48)
MAGNESIUM SERPL-MCNC: 1.8 MG/DL (ref 1.6–2.6)
MCH RBC QN AUTO: 28.9 PG (ref 27–31)
MCHC RBC AUTO-ENTMCNC: 32.5 G/DL (ref 32–36)
MCV RBC AUTO: 89 FL (ref 82–98)
MONOCYTES # BLD AUTO: 0.4 K/UL (ref 0.3–1)
MONOCYTES NFR BLD: 7.3 % (ref 4–15)
NEUTROPHILS # BLD AUTO: 3.4 K/UL (ref 1.8–7.7)
NEUTROPHILS NFR BLD: 71.6 % (ref 38–73)
NRBC BLD-RTO: 0 /100 WBC
PHOSPHATE SERPL-MCNC: 3.9 MG/DL (ref 2.7–4.5)
PLATELET # BLD AUTO: 164 K/UL (ref 150–450)
PMV BLD AUTO: 12.7 FL (ref 9.2–12.9)
POTASSIUM SERPL-SCNC: 4.3 MMOL/L (ref 3.5–5.1)
PROT SERPL-MCNC: 6.5 G/DL (ref 6–8.4)
RBC # BLD AUTO: 4.09 M/UL (ref 4–5.4)
SODIUM SERPL-SCNC: 139 MMOL/L (ref 136–145)
WBC # BLD AUTO: 4.81 K/UL (ref 3.9–12.7)

## 2024-10-22 PROCEDURE — 1160F RVW MEDS BY RX/DR IN RCRD: CPT | Mod: CPTII,S$GLB,,

## 2024-10-22 PROCEDURE — 3008F BODY MASS INDEX DOCD: CPT | Mod: CPTII,S$GLB,,

## 2024-10-22 PROCEDURE — 3079F DIAST BP 80-89 MM HG: CPT | Mod: CPTII,S$GLB,,

## 2024-10-22 PROCEDURE — 80053 COMPREHEN METABOLIC PANEL: CPT | Performed by: INTERNAL MEDICINE

## 2024-10-22 PROCEDURE — 36415 COLL VENOUS BLD VENIPUNCTURE: CPT | Performed by: INTERNAL MEDICINE

## 2024-10-22 PROCEDURE — G2211 COMPLEX E/M VISIT ADD ON: HCPCS | Mod: S$GLB,,,

## 2024-10-22 PROCEDURE — 3075F SYST BP GE 130 - 139MM HG: CPT | Mod: CPTII,S$GLB,,

## 2024-10-22 PROCEDURE — 83735 ASSAY OF MAGNESIUM: CPT | Performed by: INTERNAL MEDICINE

## 2024-10-22 PROCEDURE — 85025 COMPLETE CBC W/AUTO DIFF WBC: CPT | Performed by: INTERNAL MEDICINE

## 2024-10-22 PROCEDURE — 1159F MED LIST DOCD IN RCRD: CPT | Mod: CPTII,S$GLB,,

## 2024-10-22 PROCEDURE — 84100 ASSAY OF PHOSPHORUS: CPT | Performed by: INTERNAL MEDICINE

## 2024-10-22 PROCEDURE — 99215 OFFICE O/P EST HI 40 MIN: CPT | Mod: S$GLB,,,

## 2024-10-22 PROCEDURE — 99999 PR PBB SHADOW E&M-EST. PATIENT-LVL III: CPT | Mod: PBBFAC,,,

## 2024-10-22 NOTE — PROGRESS NOTES
Pt informed   Subjective:     Patient ID:?Joyce Velarde is a 64 y.o. female.?? MR#: [id number]   ?   PRIMARY ONCOLOGIST: Dr. Redman  ?   CHIEF COMPLAINT: follow-up; breast ca?????   ?   ONCOLOGIC DIAGNOSIS:  Cancer Staging   Malignant neoplasm of upper-outer quadrant of left breast in female, estrogen receptor positive  Staging form: Breast, AJCC 8th Edition  - Clinical stage from 2/25/2024: Stage IB (cT1b, cN1(f), cM0, G2, ER+, OH+, HER2-) - Signed by Lynda Bhandari MD on 2/25/2024  - Pathologic stage from 4/1/2024: Stage IB (pT2, pN1(sn), cM0, G2, ER+, OH+, HER2-) - Signed by Lynda Bhandari MD on 4/1/2024     ?   CURRENT TREATMENT: Arimidex 1mg daily, 07/17/24      PAST TREATMENT:  s/p BCS 03/20/24  s/p Breast Radiation (05/27/2024 - 6/28/24)    HPI  Ms. Veladre is a pleasant 64-year-old female with Stage IB (pT2 N1a M0 grade 2 ER OH + HER2 -) left breast cancer. Per review of the medical record, TB Presentation 03/07/2024: consensus for upfront surgery, genetic testing, systemic imaging, post op radiation and additional observation. Staging CT CAP and Bone scan showed no evidence of metastatic disease. S/p L breast lumpectomy 03/20/24 with final pathology significant for tumor size 2.5 x 1.6 x 1.2 cm,  2/4 + LN, G2 DCIS. Previous recommendation for two years of adjuvant abemaciclib in combination with endocrine therapy after radiation given lymph node positive disease and initiation on prolia, however, pt declined abemaciclib and prolia.      Interval History: Pt states she is feeling well overall and voices no complaints. No difficulty with taking arimidex daily.       Oncology History   Malignant neoplasm of upper-outer quadrant of left breast in female, estrogen receptor positive   2/25/2024 Initial Diagnosis    Malignant neoplasm of upper-outer quadrant of left breast in female, estrogen receptor positive     2/25/2024 Cancer Staged    Staging form: Breast, AJCC 8th Edition  - Clinical stage from  2/25/2024: Stage IB (cT1b, cN1(f), cM0, G2, ER+, NJ+, HER2-)     4/1/2024 Cancer Staged    Staging form: Breast, AJCC 8th Edition  - Pathologic stage from 4/1/2024: Stage IB (pT2, pN1(sn), cM0, G2, ER+, NJ+, HER2-)      Radiation Therapy    C50.412 - Malignant neoplasm of upper-outer quadrant of left female breast, Diagnosed 5/14/2024 (Active) Stage IB, T2, pN1a, M0, G2, HER2 Neg, ER Pos, NJ P    64-year-old otherwise healthy woman with a pT2 N1a M0 grade 2 status post lumpectomy and sentinel lymph node biopsy, 2.5 x 1.6 x 1.2 cm tumor was recovered, resected with a positive medial margin in the main specimen with negative separately submitted margin, and all other margins measuring at least 2 mm.  No lymphovascular invasion appreciated.  Associated DCIS measured up to 2 cm focally, positive for EIC, grade 2, with 7 mm anterior margin, remaining margins at least 2 mm.  2/3 sentinel lymph nodes contained disease, measuring up to 5 mm without NAVA, and with 1 additional negative node.  She has good disease insight      Treatment Summary she was treated to the whole left breast using an image guided 6 mV photon DIBH VMAT IMRT plan treating the chest wall and comprehensive regional lymphatics to 50 Gy in 25 fractions from 05/27/2024 to 06/28/2024        Social History     Socioeconomic History    Marital status:    Tobacco Use    Smoking status: Never    Smokeless tobacco: Never   Substance and Sexual Activity    Alcohol use: Yes     Comment: occ    Drug use: Never    Sexual activity: Yes     Social Drivers of Health     Financial Resource Strain: Low Risk  (7/15/2024)    Overall Financial Resource Strain (CARDIA)     Difficulty of Paying Living Expenses: Not hard at all   Food Insecurity: No Food Insecurity (7/15/2024)    Hunger Vital Sign     Worried About Running Out of Food in the Last Year: Never true     Ran Out of Food in the Last Year: Never true   Physical Activity: Sufficiently Active (7/15/2024)     Exercise Vital Sign     Days of Exercise per Week: 5 days     Minutes of Exercise per Session: 30 min   Stress: No Stress Concern Present (7/15/2024)    [place] [employer] - Occupational Stress Questionnaire     Feeling of Stress : Only a little   Housing Stability: Unknown (7/15/2024)    Housing Stability Vital Sign     Unable to Pay for Housing in the Last Year: No      Family History   Problem Relation Name Age of Onset    Breast cancer Mother Nathalie 84        inflammatory    Hypertension Mother Nathalie     Dementia Mother Nathalie     Hyperlipidemia Mother Nathalie     Breast cancer Sister Teetee 43        genetic testing negative (~)    No Known Problems Brother Marcelino     No Known Problems Brother Juan     Lung cancer Maternal Grandmother          +smoking hx    Heart disease Maternal Grandmother      Heart disease Maternal Grandfather      Breast cancer Maternal Cousin Nilsa 53        genetic testing negative ()    Breast cancer Paternal Cousin      Breast cancer Other Zofia       Past Surgical History:   Procedure Laterality Date    BILATERAL TUBAL LIGATION       SECTION      X 2    INJECTION FOR SENTINEL NODE IDENTIFICATION Left 3/20/2024    Procedure: INJECTION, FOR SENTINEL NODE IDENTIFICATION;  Surgeon: Lynda Bhandari MD;  Location: [facility];  Service: General;  Laterality: Left;    LUMPECTOMY, WITH RADAR LOCALIZATION USING GEETA  Left 3/20/2024    Procedure: LUMPECTOMY,WITH RADAR LOCALIZATION USING GEETA ;  Surgeon: Lynda Bhandari MD;  Location: [facility];  Service: General;  Laterality: Left;  Left Lumpectomy Using Preoperatively Placed Radiographic Marker (Geeta )  Left Targeted Axillary Lymph Node Dissection  Injection of Isosulfan Blue Dye  Interpretation of Specimen Mammogram    MAPPING, LYMPH NODE, SENTINEL Left 3/20/2024    Procedure: MAPPING, LYMPH NODE, SENTINEL;  Surgeon: Lynda Bhandari MD;  Location: [facility];  Service: General;   Laterality: Left;    SENTINEL LYMPH NODE BIOPSY Left 3/20/2024    Procedure: BIOPSY, LYMPH NODE, SENTINEL;  Surgeon: Lynad Bhandari MD;  Location: [facility];  Service: General;  Laterality: Left;        Review of Systems   Constitutional:  Negative for activity change, appetite change, chills, fatigue and fever.   HENT:  Negative for congestion, facial swelling, nosebleeds, rhinorrhea, sore throat and trouble swallowing.    Eyes:  Negative for photophobia, pain and visual disturbance.   Respiratory:  Negative for cough, shortness of breath and wheezing.    Cardiovascular:  Negative for chest pain, palpitations and leg swelling.   Gastrointestinal:  Negative for abdominal distention, abdominal pain, anal bleeding, blood in stool, constipation, diarrhea, nausea, rectal pain and vomiting.   Genitourinary:  Negative for difficulty urinating, dysuria, hematuria and vaginal bleeding.   Musculoskeletal:  Negative for arthralgias.   Skin:  Negative for color change, pallor, rash and wound.   Neurological:  Negative for dizziness, light-headedness, numbness and headaches.   Hematological:  Negative for adenopathy. Does not bruise/bleed easily.   Psychiatric/Behavioral:  The patient is not nervous/anxious.        ?   A comprehensive 14-point review of systems was reviewed with patient and was negative other than as specified above.   ?     Objective:      Physical Exam  Vitals reviewed.   Constitutional:       Appearance: Normal appearance. She is not ill-appearing.   HENT:      Head: Normocephalic and atraumatic.      Right Ear: External ear normal.      Left Ear: External ear normal.      Nose: Nose normal.      Mouth/Throat:      Mouth: Mucous membranes are moist.      Pharynx: Oropharynx is clear.   Eyes:      Conjunctiva/sclera: Conjunctivae normal.   Cardiovascular:      Rate and Rhythm: Normal rate.   Pulmonary:      Effort: Pulmonary effort is normal.   Abdominal:      General: Abdomen is flat.   Genitourinary:      Comments: deferred  Musculoskeletal:         General: Normal range of motion.      Cervical back: Normal range of motion.      Right lower leg: No edema.      Left lower leg: No edema.   Skin:     General: Skin is warm and dry.      Capillary Refill: Capillary refill takes less than 2 seconds.   Neurological:      Mental Status: She is alert and oriented to person, place, and time.      Motor: No weakness.   Psychiatric:         Mood and Affect: Mood normal.         Behavior: Behavior normal.         Thought Content: Thought content normal.         Judgment: Judgment normal.           ?   Vitals:    10/22/24 1501   BP: 133/84   Pulse: 72   Temp: 97.2 °F (36.2 °C)      ?       ?   Laboratory:  ?   Lab Visit on 10/22/2024   Component Date Value Ref Range Status    Magnesium 10/22/2024 1.8  1.6 - 2.6 mg/dL Final    Sodium 10/22/2024 139  136 - 145 mmol/L Final    Potassium 10/22/2024 4.3  3.5 - 5.1 mmol/L Final    Chloride 10/22/2024 106  95 - 110 mmol/L Final    CO2 10/22/2024 25  23 - 29 mmol/L Final    Glucose 10/22/2024 99  70 - 110 mg/dL Final    BUN 10/22/2024 16  8 - 23 mg/dL Final    Creatinine 10/22/2024 0.8  0.5 - 1.4 mg/dL Final    Calcium 10/22/2024 9.5  8.7 - 10.5 mg/dL Final    Total Protein 10/22/2024 6.5  6.0 - 8.4 g/dL Final    Albumin 10/22/2024 4.0  3.5 - 5.2 g/dL Final    Total Bilirubin 10/22/2024 0.3  0.1 - 1.0 mg/dL Final    Alkaline Phosphatase 10/22/2024 68  40 - 150 U/L Final    AST 10/22/2024 21  10 - 40 U/L Final    ALT 10/22/2024 18  10 - 44 U/L Final    eGFR 10/22/2024 >60  >60 mL/min/1.73 m^2 Final    Anion Gap 10/22/2024 8  8 - 16 mmol/L Final    WBC 10/22/2024 4.81  3.90 - 12.70 K/uL Final    RBC 10/22/2024 4.09  4.00 - 5.40 M/uL Final    Hemoglobin 10/22/2024 11.8 (L)  12.0 - 16.0 g/dL Final    Hematocrit 10/22/2024 36.3 (L)  37.0 - 48.5 % Final    MCV 10/22/2024 89  82 - 98 fL Final    MCH 10/22/2024 28.9  27.0 - 31.0 pg Final    MCHC 10/22/2024 32.5  32.0 - 36.0 g/dL Final    RDW  10/22/2024 12.1  11.5 - 14.5 % Final    Platelets 10/22/2024 164  150 - 450 K/uL Final    MPV 10/22/2024 12.7  9.2 - 12.9 fL Final    Immature Granulocytes 10/22/2024 0.6 (H)  0.0 - 0.5 % Final    Gran # (ANC) 10/22/2024 3.4  1.8 - 7.7 K/uL Final    Immature Grans (Abs) 10/22/2024 0.03  0.00 - 0.04 K/uL Final    Lymph # 10/22/2024 0.8 (L)  1.0 - 4.8 K/uL Final    Mono # 10/22/2024 0.4  0.3 - 1.0 K/uL Final    Eos # 10/22/2024 0.2  0.0 - 0.5 K/uL Final    Baso # 10/22/2024 0.03  0.00 - 0.20 K/uL Final    nRBC 10/22/2024 0  0 /100 WBC Final    Gran % 10/22/2024 71.6  38.0 - 73.0 % Final    Lymph % 10/22/2024 16.2 (L)  18.0 - 48.0 % Final    Mono % 10/22/2024 7.3  4.0 - 15.0 % Final    Eosinophil % 10/22/2024 3.7  0.0 - 8.0 % Final    Basophil % 10/22/2024 0.6  0.0 - 1.9 % Final    Differential Method 10/22/2024 Automated   Final      ?   Imaging:    No results found for this or any previous visit (from the past 2160 hours).     No results found for this or any previous visit (from the past 2160 hours).     ?   Assessment/Plan:     Problem List Items Addressed This Visit       Malignant neoplasm of upper-outer quadrant of left breast in female, estrogen receptor positive - Primary      Cancer Staging   Malignant neoplasm of upper-outer quadrant of left breast in female, estrogen receptor positive  Staging form: Breast, AJCC 8th Edition  - Clinical stage from 2/25/2024: Stage IB (cT1b, cN1(f), cM0, G2, ER+, [place]+, HER2-) - Signed by Lynda Bhandari MD on 2/25/2024  - Pathologic stage from 4/1/2024: Stage IB (pT2, pN1(sn), cM0, G2, ER+, KY+, HER2-) - Signed by Lynda Bhandari MD on 4/1/2024    s/p BCS 03/20/24  s/p Breast Radiation (05/27/2024 - 6/28/24)  Initiated on Arimidex 07/17/24    Pt continues on arimidex daily without difficulty  She notes she has f/u with Dr. Bhandari next month for survivorship  She has declined Prolia injections and Verzenio    F/u in 4 months with repeat labs prior         Relevant  Orders    CBC Auto Differential    Comprehensive Metabolic Panel    Aromatase inhibitor use     Continues on Armidex daily   Continues on Ca+D supplementation  DEXA 07/10/24 with osteopenia   Pt declines Prolia at this time   Recommend weight bearing aerobic activity at least 30 minutes at least 5 days per week           Relevant Orders    CBC Auto Differential    Comprehensive Metabolic Panel        Med Onc Chart Routing      Follow up with physician 4 months. with labs prior   Follow up with [facility]    Infusion scheduling note    Injection scheduling note    Labs CBC and CMP   Scheduling:  Preferred lab:  Lab interval:     Imaging    Pharmacy appointment    Other referrals                       MASSIEL Renee-DAMARIS  Hematology/Oncology

## 2024-10-22 NOTE — ASSESSMENT & PLAN NOTE
Continues on Armidex daily   Continues on Ca+D supplementation  DEXA 07/10/24 with osteopenia   Pt declines Prolia at this time   Recommend weight bearing aerobic activity at least 30 minutes at least 5 days per week

## 2024-10-22 NOTE — ASSESSMENT & PLAN NOTE
Cancer Staging   Malignant neoplasm of upper-outer quadrant of left breast in female, estrogen receptor positive  Staging form: Breast, AJCC 8th Edition  - Clinical stage from 2/25/2024: Stage IB (cT1b, cN1(f), cM0, G2, ER+, GA+, HER2-) - Signed by Lynda Bhandari MD on 2/25/2024  - Pathologic stage from 4/1/2024: Stage IB (pT2, pN1(sn), cM0, G2, ER+, GA+, HER2-) - Signed by Lynda Bhandari MD on 4/1/2024    s/p BCS 03/20/24  s/p Breast Radiation (05/27/2024 - 6/28/24)  Initiated on Arimidex 07/17/24    Pt continues on arimidex daily without difficulty  She notes she has f/u with Dr. Bhandari next month for survivorship  She has declined Prolia injections and Verzenio    F/u in 4 months with repeat labs prior

## 2025-01-14 DIAGNOSIS — Z17.0 MALIGNANT NEOPLASM OF UPPER-OUTER QUADRANT OF LEFT BREAST IN FEMALE, ESTROGEN RECEPTOR POSITIVE: Primary | ICD-10-CM

## 2025-01-14 DIAGNOSIS — C50.412 MALIGNANT NEOPLASM OF UPPER-OUTER QUADRANT OF LEFT BREAST IN FEMALE, ESTROGEN RECEPTOR POSITIVE: Primary | ICD-10-CM

## 2025-01-14 DIAGNOSIS — Z79.811 AROMATASE INHIBITOR USE: ICD-10-CM

## 2025-01-14 RX ORDER — ANASTROZOLE 1 MG/1
1 TABLET ORAL DAILY
Qty: 90 TABLET | Refills: 3 | Status: SHIPPED | OUTPATIENT
Start: 2025-01-14 | End: 2026-01-09

## 2025-01-31 ENCOUNTER — OFFICE VISIT (OUTPATIENT)
Dept: RADIATION ONCOLOGY | Facility: CLINIC | Age: 65
End: 2025-01-31
Payer: COMMERCIAL

## 2025-01-31 VITALS
TEMPERATURE: 99 F | BODY MASS INDEX: 28.24 KG/M2 | HEART RATE: 76 BPM | DIASTOLIC BLOOD PRESSURE: 86 MMHG | HEIGHT: 64 IN | WEIGHT: 165.38 LBS | OXYGEN SATURATION: 99 % | SYSTOLIC BLOOD PRESSURE: 128 MMHG

## 2025-01-31 DIAGNOSIS — C50.412 MALIGNANT NEOPLASM OF UPPER-OUTER QUADRANT OF LEFT BREAST IN FEMALE, ESTROGEN RECEPTOR POSITIVE: Primary | ICD-10-CM

## 2025-01-31 DIAGNOSIS — Z17.0 MALIGNANT NEOPLASM OF UPPER-OUTER QUADRANT OF LEFT BREAST IN FEMALE, ESTROGEN RECEPTOR POSITIVE: Primary | ICD-10-CM

## 2025-01-31 PROCEDURE — 3079F DIAST BP 80-89 MM HG: CPT | Mod: CPTII,S$GLB,, | Performed by: SPECIALIST

## 2025-01-31 PROCEDURE — 3074F SYST BP LT 130 MM HG: CPT | Mod: CPTII,S$GLB,, | Performed by: SPECIALIST

## 2025-01-31 PROCEDURE — 1159F MED LIST DOCD IN RCRD: CPT | Mod: CPTII,S$GLB,, | Performed by: SPECIALIST

## 2025-01-31 PROCEDURE — 3288F FALL RISK ASSESSMENT DOCD: CPT | Mod: CPTII,S$GLB,, | Performed by: SPECIALIST

## 2025-01-31 PROCEDURE — 3008F BODY MASS INDEX DOCD: CPT | Mod: CPTII,S$GLB,, | Performed by: SPECIALIST

## 2025-01-31 PROCEDURE — 99213 OFFICE O/P EST LOW 20 MIN: CPT | Mod: S$GLB,,, | Performed by: SPECIALIST

## 2025-01-31 PROCEDURE — 1101F PT FALLS ASSESS-DOCD LE1/YR: CPT | Mod: CPTII,S$GLB,, | Performed by: SPECIALIST

## 2025-01-31 PROCEDURE — 99999 PR PBB SHADOW E&M-EST. PATIENT-LVL III: CPT | Mod: PBBFAC,,, | Performed by: SPECIALIST

## 2025-01-31 NOTE — PROGRESS NOTES
"[facility] / [facility] - Radiation Oncology Follow Up Note    HISTORY OF PRESENT ILLNESS:  C50.412:  64-year-old otherwise healthy woman with a pT2 N1a M0 grade 2 ER OH positive HER2 negative upper-outer left breast cancer status post breast conservation recovering a 2.5 cm lesion resected with positive medial margin on the main specimen but negative separately submitted margin, and all other margins at least 2 mm.  No LVI.  Associated DCIS measured up to 2 cm focally, positive for EIC, grade 2, resected with negative margin.  2/3 sentinel lymph nodes contained disease measuring up to 5 mm without NAVA, and with 1 additional negative node.      She completed and regional radiotherapy on 06/28/2024       INTERVAL HISTORY:  She returns for routine six-month follow up.  She has no new complaints related to therapy are worrisome for local regional or metastatic recurrence.  Arimidex is ongoing and well top      PHYSICAL EXAMINATION:  Vitals:    01/31/25 0845   BP: 128/86   Pulse: 76   Temp: 98.6 °F (37 °C)   SpO2: 99%   Weight: 75 kg (165 lb 5.5 oz)   Height: 5' 4" (1.626 m)      General:  A&O x4, NAD  HEENT:  PEERLA, CN II-XII intact, EOM intact,   Lymphatics:  no cervical/sclav LAD  Lungs:  CTAB  Heart:  RRR  Breasts:  Subtle asymmetry with mild-to-moderate residual pigmentary change and lateral deviation of the affected left nipple-areolar complex consistent with her lateral circumareolar incision.  No nipple discharge dimpling or worrisome pigmentary change bilaterally.  Both breasts are soft and a nodular with negative  Abdomen:  NTND +BS, no HSM      ASSESSMENT:  Clinically RICKIE with ongoing      PLAN:  Follow up in 6 months  .  We reviewed the importance of by monthly self exam and continued close physician follow up and mammography    Her insurance will not allow her to go to [facility] and we will arrange follow up in our Surgical [facility]        "

## 2025-02-21 ENCOUNTER — HOSPITAL ENCOUNTER (OUTPATIENT)
Dept: RADIOLOGY | Facility: HOSPITAL | Age: 65
Discharge: HOME | End: 2025-02-21
Attending: SURGERY
Payer: MEDICARE

## 2025-02-21 DIAGNOSIS — Z17.0 MALIGNANT NEOPLASM OF UPPER-OUTER QUADRANT OF LEFT BREAST IN FEMALE, ESTROGEN RECEPTOR POSITIVE: ICD-10-CM

## 2025-02-21 DIAGNOSIS — C50.412 MALIGNANT NEOPLASM OF UPPER-OUTER QUADRANT OF LEFT BREAST IN FEMALE, ESTROGEN RECEPTOR POSITIVE: ICD-10-CM

## 2025-02-21 PROCEDURE — 77066 DX MAMMO INCL CAD BI: CPT | Mod: 26,HCNC,, | Performed by: STUDENT IN AN ORGANIZED HEALTH CARE EDUCATION/TRAINING PROGRAM

## 2025-02-21 PROCEDURE — 77066 DX MAMMO INCL CAD BI: CPT | Mod: TC,HCNC

## 2025-02-21 PROCEDURE — 77062 BREAST TOMOSYNTHESIS BI: CPT | Mod: 26,HCNC,, | Performed by: STUDENT IN AN ORGANIZED HEALTH CARE EDUCATION/TRAINING PROGRAM

## 2025-03-07 ENCOUNTER — TELEPHONE (OUTPATIENT)
Dept: SURGICAL ONCOLOGY | Facility: CLINIC | Age: 65
End: 2025-03-07
Payer: COMMERCIAL

## 2025-03-07 NOTE — TELEPHONE ENCOUNTER
called patient to discuss upcoming appointment with . patient did not answer. [facility] with call back number of [phone]

## 2025-03-13 ENCOUNTER — OFFICE VISIT (OUTPATIENT)
Dept: HEMATOLOGY/ONCOLOGY | Facility: CLINIC | Age: 65
End: 2025-03-13
Payer: COMMERCIAL

## 2025-03-13 ENCOUNTER — LAB VISIT (OUTPATIENT)
Dept: LAB | Facility: HOSPITAL | Age: 65
End: 2025-03-13
Payer: COMMERCIAL

## 2025-03-13 ENCOUNTER — OFFICE VISIT (OUTPATIENT)
Dept: SURGERY | Facility: CLINIC | Age: 65
End: 2025-03-13
Payer: COMMERCIAL

## 2025-03-13 VITALS
HEIGHT: 64 IN | BODY MASS INDEX: 28.42 KG/M2 | SYSTOLIC BLOOD PRESSURE: 157 MMHG | HEART RATE: 71 BPM | WEIGHT: 166.44 LBS | DIASTOLIC BLOOD PRESSURE: 92 MMHG

## 2025-03-13 VITALS
HEART RATE: 78 BPM | WEIGHT: 166.69 LBS | TEMPERATURE: 98 F | SYSTOLIC BLOOD PRESSURE: 141 MMHG | HEIGHT: 64 IN | BODY MASS INDEX: 28.46 KG/M2 | DIASTOLIC BLOOD PRESSURE: 82 MMHG | OXYGEN SATURATION: 98 %

## 2025-03-13 DIAGNOSIS — C50.412 MALIGNANT NEOPLASM OF UPPER-OUTER QUADRANT OF LEFT BREAST IN FEMALE, ESTROGEN RECEPTOR POSITIVE: ICD-10-CM

## 2025-03-13 DIAGNOSIS — E63.9 NUTRITIONAL DEFICIENCY: ICD-10-CM

## 2025-03-13 DIAGNOSIS — Z79.811 AROMATASE INHIBITOR USE: ICD-10-CM

## 2025-03-13 DIAGNOSIS — E63.9 NUTRITIONAL DEFICIENCY: Primary | ICD-10-CM

## 2025-03-13 DIAGNOSIS — C50.412 MALIGNANT NEOPLASM OF UPPER-OUTER QUADRANT OF LEFT BREAST IN FEMALE, ESTROGEN RECEPTOR POSITIVE: Primary | ICD-10-CM

## 2025-03-13 DIAGNOSIS — Z17.0 MALIGNANT NEOPLASM OF UPPER-OUTER QUADRANT OF LEFT BREAST IN FEMALE, ESTROGEN RECEPTOR POSITIVE: ICD-10-CM

## 2025-03-13 DIAGNOSIS — Z17.0 MALIGNANT NEOPLASM OF UPPER-OUTER QUADRANT OF LEFT BREAST IN FEMALE, ESTROGEN RECEPTOR POSITIVE: Primary | ICD-10-CM

## 2025-03-13 LAB
25(OH)D3+25(OH)D2 SERPL-MCNC: 102 NG/ML (ref 30–96)
FERRITIN SERPL-MCNC: 163 NG/ML (ref 20–300)
FOLATE SERPL-MCNC: 9.2 NG/ML (ref 4–24)
IRON SERPL-MCNC: 45 UG/DL (ref 30–160)
MAGNESIUM SERPL-MCNC: 2 MG/DL (ref 1.6–2.6)
PHOSPHATE SERPL-MCNC: 4 MG/DL (ref 2.7–4.5)
SATURATED IRON: 13 % (ref 20–50)
TOTAL IRON BINDING CAPACITY: 342 UG/DL (ref 250–450)
TRANSFERRIN SERPL-MCNC: 231 MG/DL (ref 200–375)
VIT B12 SERPL-MCNC: 234 PG/ML (ref 210–950)

## 2025-03-13 PROCEDURE — 99215 OFFICE O/P EST HI 40 MIN: CPT | Mod: S$GLB,,,

## 2025-03-13 PROCEDURE — 1160F RVW MEDS BY RX/DR IN RCRD: CPT | Mod: CPTII,S$GLB,, | Performed by: SURGERY

## 2025-03-13 PROCEDURE — 3080F DIAST BP >= 90 MM HG: CPT | Mod: CPTII,S$GLB,, | Performed by: SURGERY

## 2025-03-13 PROCEDURE — G2211 COMPLEX E/M VISIT ADD ON: HCPCS | Mod: S$GLB,,,

## 2025-03-13 PROCEDURE — 3008F BODY MASS INDEX DOCD: CPT | Mod: CPTII,S$GLB,, | Performed by: SURGERY

## 2025-03-13 PROCEDURE — 3077F SYST BP >= 140 MM HG: CPT | Mod: CPTII,S$GLB,,

## 2025-03-13 PROCEDURE — 82306 VITAMIN D 25 HYDROXY: CPT

## 2025-03-13 PROCEDURE — 83735 ASSAY OF MAGNESIUM: CPT

## 2025-03-13 PROCEDURE — 84466 ASSAY OF TRANSFERRIN: CPT

## 2025-03-13 PROCEDURE — 99999 PR PBB SHADOW E&M-EST. PATIENT-LVL III: CPT | Mod: PBBFAC,,,

## 2025-03-13 PROCEDURE — 36415 COLL VENOUS BLD VENIPUNCTURE: CPT

## 2025-03-13 PROCEDURE — 82607 VITAMIN B-12: CPT

## 2025-03-13 PROCEDURE — 1159F MED LIST DOCD IN RCRD: CPT | Mod: CPTII,S$GLB,, | Performed by: SURGERY

## 2025-03-13 PROCEDURE — 82746 ASSAY OF FOLIC ACID SERUM: CPT

## 2025-03-13 PROCEDURE — 99214 OFFICE O/P EST MOD 30 MIN: CPT | Mod: S$GLB,,, | Performed by: SURGERY

## 2025-03-13 PROCEDURE — 82728 ASSAY OF FERRITIN: CPT

## 2025-03-13 PROCEDURE — 3079F DIAST BP 80-89 MM HG: CPT | Mod: CPTII,S$GLB,,

## 2025-03-13 PROCEDURE — 99999 PR PBB SHADOW E&M-EST. PATIENT-LVL III: CPT | Mod: PBBFAC,,, | Performed by: SURGERY

## 2025-03-13 PROCEDURE — 3008F BODY MASS INDEX DOCD: CPT | Mod: CPTII,S$GLB,,

## 2025-03-13 PROCEDURE — 84100 ASSAY OF PHOSPHORUS: CPT

## 2025-03-13 PROCEDURE — 3077F SYST BP >= 140 MM HG: CPT | Mod: CPTII,S$GLB,, | Performed by: SURGERY

## 2025-03-13 PROCEDURE — 1101F PT FALLS ASSESS-DOCD LE1/YR: CPT | Mod: CPTII,S$GLB,,

## 2025-03-13 PROCEDURE — 3288F FALL RISK ASSESSMENT DOCD: CPT | Mod: CPTII,S$GLB,,

## 2025-03-13 NOTE — PROGRESS NOTES
Subjective:     Patient ID:?Joyce Velarde is a 65 y.o. female.?? MR#: [id number]   ?   PRIMARY ONCOLOGIST: Dr. Redman  ?   CHIEF COMPLAINT: follow-up; breast ca?????   ?   ONCOLOGIC DIAGNOSIS:  Cancer Staging   Malignant neoplasm of upper-outer quadrant of left breast in female, estrogen receptor positive  Staging form: Breast, AJCC 8th Edition  - Clinical stage from 2/25/2024: Stage IB (cT1b, cN1(f), cM0, G2, ER+, OR+, HER2-) - Signed by Lynda Bhandari MD on 2/25/2024  - Pathologic stage from 4/1/2024: Stage IB (pT2, pN1(sn), cM0, G2, ER+, OR+, HER2-) - Signed by Lynda Bhandari MD on 4/1/2024     ?   CURRENT TREATMENT: Arimidex 1mg daily, 07/17/24      PAST TREATMENT:  s/p BCS 03/20/24  s/p Breast Radiation (05/27/2024 - 6/28/24)    HPI  Ms. Velarde is a pleasant 65-year-old female with Stage IB (pT2 N1a M0 grade 2 ER OR + HER2 -) left breast cancer. Per review of the medical record, TB Presentation 03/07/2024: consensus for upfront surgery, genetic testing, systemic imaging, post op radiation and additional observation. Staging CT CAP and Bone scan showed no evidence of metastatic disease. S/p L breast lumpectomy 03/20/24 with final pathology significant for tumor size 2.5 x 1.6 x 1.2 cm,  2/4 + LN, G2 DCIS. Previous recommendation for two years of adjuvant abemaciclib in combination with endocrine therapy after radiation given lymph node positive disease and initiation on prolia, however, pt declined abemaciclib and prolia.      Interval History: Pt states she is feeling well overall and voices no complaints. No difficulty with taking arimidex daily.       Oncology History   Malignant neoplasm of upper-outer quadrant of left breast in female, estrogen receptor positive   2/25/2024 Initial Diagnosis    Malignant neoplasm of upper-outer quadrant of left breast in female, estrogen receptor positive     2/25/2024 Cancer Staged    Staging form: Breast, AJCC 8th Edition  - Clinical stage from  2/25/2024: Stage IB (cT1b, cN1(f), cM0, G2, ER+, NE+, HER2-)     4/1/2024 Cancer Staged    Staging form: Breast, AJCC 8th Edition  - Pathologic stage from 4/1/2024: Stage IB (pT2, pN1(sn), cM0, G2, ER+, NE+, HER2-)      Radiation Therapy    C50.412 - Malignant neoplasm of upper-outer quadrant of left female breast, Diagnosed 5/14/2024 (Active) Stage IB, T2, pN1a, M0, G2, HER2 Neg, ER Pos, NE P    64-year-old otherwise healthy woman with a pT2 N1a M0 grade 2 status post lumpectomy and sentinel lymph node biopsy, 2.5 x 1.6 x 1.2 cm tumor was recovered, resected with a positive medial margin in the main specimen with negative separately submitted margin, and all other margins measuring at least 2 mm.  No lymphovascular invasion appreciated.  Associated DCIS measured up to 2 cm focally, positive for EIC, grade 2, with 7 mm anterior margin, remaining margins at least 2 mm.  2/3 sentinel lymph nodes contained disease, measuring up to 5 mm without NAVA, and with 1 additional negative node.  She has good disease insight      Treatment Summary she was treated to the whole left breast using an image guided 6 mV photon DIBH VMAT IMRT plan treating the chest wall and comprehensive regional lymphatics to 50 Gy in 25 fractions from 05/27/2024 to 06/28/2024        Social History     Socioeconomic History    Marital status:    Tobacco Use    Smoking status: Never    Smokeless tobacco: Never   Substance and Sexual Activity    Alcohol use: Yes     Comment: occ    Drug use: Never    Sexual activity: Yes     Social Drivers of Health     Financial Resource Strain: Low Risk  (2/17/2025)    Overall Financial Resource Strain (CARDIA)     Difficulty of Paying Living Expenses: Not hard at all   Food Insecurity: No Food Insecurity (2/17/2025)    Hunger Vital Sign     Worried About Running Out of Food in the Last Year: Never true     Ran Out of Food in the Last Year: Never true   Transportation Needs: No Transportation Needs  (2025)    PRAPARE - Transportation     Lack of Transportation (Medical): No     Lack of Transportation (Non-Medical): No   Physical Activity: Sufficiently Active (2025)    Exercise Vital Sign     Days of Exercise per Week: 4 days     Minutes of Exercise per Session: 40 min   Recent Concern: Physical Activity - Insufficiently Active (2024)    [facility]    Exercise Vital Sign     Days of Exercise per Week: 2 days     Minutes of Exercise per Session: 60 min   Stress: No Stress Concern Present (2025)    [employer] - Occupational Stress Questionnaire     Feeling of Stress : Not at all   Housing Stability: Low Risk  (2025)    Housing Stability Vital Sign     Unable to Pay for Housing in the Last Year: No     Number of Times Moved in the Last Year: 0     Homeless in the Last Year: No      Family History   Problem Relation Name Age of Onset    Breast cancer Mother Nathalie 84        inflammatory    Hypertension Mother Nathalie     Dementia Mother Nathalie     Hyperlipidemia Mother Nathalie     Breast cancer Sister Teetee 43        genetic testing negative (~)    No Known Problems Brother Marcelino     No Known Problems Brother Juan     Lung cancer Maternal Grandmother          +smoking hx    Heart disease Maternal Grandmother      Heart disease Maternal Grandfather      Breast cancer Maternal Cousin Nilsa 53        genetic testing negative ()    Breast cancer Paternal Cousin      Breast cancer Other Zofia       Past Surgical History:   Procedure Laterality Date    BILATERAL TUBAL LIGATION      BREAST LUMPECTOMY Left      SECTION      X 2    INJECTION FOR SENTINEL NODE IDENTIFICATION Left 2024    Procedure: INJECTION, FOR SENTINEL NODE IDENTIFICATION;  Surgeon: Lynda Bhandari MD;  Location: [facility];  Service: General;  Laterality: Left;    LUMPECTOMY, WITH RADAR LOCALIZATION USING GEETA  Left  03/20/2024    Procedure: LUMPECTOMY,WITH RADAR LOCALIZATION USING RERE ;  Surgeon: Lynda Bhandari MD;  Location: [facility];  Service: General;  Laterality: Left;  Left Lumpectomy Using Preoperatively Placed Radiographic Marker (Rere )  Left Targeted Axillary Lymph Node Dissection  Injection of Isosulfan Blue Dye  Interpretation of Specimen Mammogram    MAPPING, LYMPH NODE, SENTINEL Left 03/20/2024    Procedure: MAPPING, LYMPH NODE, SENTINEL;  Surgeon: Lynda Bhandari MD;  Location: [facility] OR;  Service: General;  Laterality: Left;    SENTINEL LYMPH NODE BIOPSY Left 03/20/2024    Procedure: BIOPSY, LYMPH NODE, SENTINEL;  Surgeon: Lynda Bhandari MD;  Location: [facility];  Service: General;  Laterality: Left;        Review of Systems   Constitutional:  Negative for activity change, appetite change, chills, fatigue and fever.   HENT:  Negative for congestion, facial swelling, nosebleeds, rhinorrhea, sore throat and trouble swallowing.    Eyes:  Negative for photophobia, pain and visual disturbance.   Respiratory:  Negative for cough, shortness of breath and wheezing.    Cardiovascular:  Negative for chest pain, palpitations and leg swelling.   Gastrointestinal:  Negative for abdominal distention, abdominal pain, anal bleeding, blood in stool, constipation, diarrhea, nausea, rectal pain and vomiting.   Genitourinary:  Negative for difficulty urinating, dysuria, hematuria and vaginal bleeding.   Musculoskeletal:  Negative for arthralgias.   Skin:  Negative for color change, pallor, rash and wound.   Neurological:  Negative for dizziness, light-headedness, numbness and headaches.   Hematological:  Negative for adenopathy. Does not bruise/bleed easily.   Psychiatric/Behavioral:  The patient is not nervous/anxious.        ?   A comprehensive 14-point review of systems was reviewed with patient and was negative other than as specified above.   ?     Objective:      Physical Exam  Vitals reviewed.    Constitutional:       Appearance: Normal appearance. She is not ill-appearing.   HENT:      Head: Normocephalic and atraumatic.      Right Ear: External ear normal.      Left Ear: External ear normal.      Nose: Nose normal.      Mouth/Throat:      Mouth: Mucous membranes are moist.      Pharynx: Oropharynx is clear.   Eyes:      Conjunctiva/sclera: Conjunctivae normal.   Cardiovascular:      Rate and Rhythm: Normal rate.   Pulmonary:      Effort: Pulmonary effort is normal.   Abdominal:      General: Abdomen is flat.   Genitourinary:     Comments: deferred  Musculoskeletal:         General: Normal range of motion.      Cervical back: Normal range of motion.      Right lower leg: No edema.      Left lower leg: No edema.   Skin:     General: Skin is warm and dry.      Capillary Refill: Capillary refill takes less than 2 seconds.   Neurological:      Mental Status: She is alert and oriented to person, place, and time.      Motor: No weakness.   Psychiatric:         Mood and Affect: Mood normal.         Behavior: Behavior normal.         Thought Content: Thought content normal.         Judgment: Judgment normal.         ?   Vitals:    03/13/25 1538   BP: (!) 141/82   Pulse: 78   Temp: 97.8 °F (36.6 °C)      ?       ?   Laboratory:  ?   Lab Visit on 03/13/2025   Component Date Value Ref Range Status    Ferritin 03/13/2025 163  20.0 - 300.0 ng/mL Final    Magnesium 03/13/2025 2.0  1.6 - 2.6 mg/dL Final    Phosphorus 03/13/2025 4.0  2.7 - 4.5 mg/dL Final    Vitamin B-12 03/13/2025 234  210 - 950 pg/mL Final    Folate 03/13/2025 9.2  4.0 - 24.0 ng/mL Final    Iron 03/13/2025 45  30 - 160 ug/dL Final    Transferrin 03/13/2025 231  200 - 375 mg/dL Final    TIBC 03/13/2025 342  250 - 450 ug/dL Final    Saturated Iron 03/13/2025 13 (L)  20 - 50 % Final    Vit D, 25-Hydroxy 03/13/2025 102 (H)  30 - 96 ng/mL Final      ?   Imaging:    No results found for this or any previous visit (from the past 2160 hours).     No  results found for this or any previous visit (from the past 2160 hours).     ?   Assessment/Plan:     Problem List Items Addressed This Visit       Malignant neoplasm of upper-outer quadrant of left breast in female, estrogen receptor positive     Cancer Staging   Malignant neoplasm of upper-outer quadrant of left breast in female, estrogen receptor positive  Staging form: Breast, AJCC 8th Edition  - Clinical stage from 2/25/2024: Stage IB (cT1b, cN1(f), cM0, G2, ER+, KS+, HER2-) - Signed by Lynda Bhandari MD on 2/25/2024  - Pathologic stage from 4/1/2024: Stage IB (pT2, pN1(sn), cM0, G2, ER+, KS+, HER2-) - Signed by Lynda Bhandari MD on 4/1/2024    s/p BCS 03/20/24  s/p Breast Radiation (05/27/2024 - 6/28/24)  Initiated on Arimidex 07/17/24  She has declined Prolia injections and Verzenio    Pt continues on arimidex daily without difficulty  She notes she has had f/u with breast surgery (Dr. Choudhury) 03/13/25    MMG 02/2025  Left--Post-Surgical Finding: Left breast post-surgical finding in the upper outer quadrant in the posterior depth. Assessment: 3 - Probably benign. Short Interval Follow-Up in 6 Months is recommended.    Right--There is no mammographic evidence of malignancy in the right breast.      F/u in 4 months with repeat labs prior          Other Visit Diagnoses         Nutritional deficiency    -  Primary    Relevant Orders    Ferritin (Completed)    Magnesium (Completed)    Phosphorus (Completed)    Vitamin B12 (Completed)    Folate (Completed)    Iron and TIBC (Completed)    Vitamin D (Completed)             Med Onc Chart Routing      Follow up with physician 3 months. with labs prior   Follow up with [facility]    Infusion scheduling note    Injection scheduling note    Labs CBC and CMP   Scheduling:  Preferred lab:  Lab interval:     Imaging    Pharmacy appointment    Other referrals                   DARIO Renee  Hematology/Oncology

## 2025-03-13 NOTE — PROGRESS NOTES
Breast Surgical Oncology  [place]    Date of Service: 03/13/2025    DIAGNOSIS:   65 y.o. female with a history of left breast cancer.    Date of Diagnosis: 2/21/24  Pathology: IDC, G2, ER 90-95%, AK 20-30%, HER2 0, Ki67 40%  Clinical Stage: IB (cT1b cN1 M0)  Pathologic Stage: IB (pT2 pN1 M0)    TREATMENT:   Surgery: left partial mastectomy with targeted axillary node biopsy on 3/20/24. Lynda Bhandari M.D. Breast Surgical Oncology  Systemic Therapy: Endocrine therapy not yet initiated, contemplating adjuvant Verzinio - TBD  Medical Oncologist: Sandi Redman M.D.    Radiation Treatment Summary: WBRT + RNI from 05/27/2024 to 06/28/2024   Radiation Oncologist: Diego Bernstein MD  Genetics: negative  Physical Therapy: prehabilitation     HISTORY OF PRESENT ILLNESS:   Joyce Velarde is here for oncological follow up.      In the interim she denies new breast concerns such as pain, masses, skin changes, nipple discharge, nipple retraction or lumps under the arm.  She also denies bone pain, shortness of breath, abdominal pain and neurologic symptoms.     IMAGING:   Result:   Mammo Digital Diagnostic Bilat with Trevor     History:  Patient is 65 y.o. and is seen for malignant neoplasm of upper-outer quadrant of left breast in female, estrogen receptor positive.     Films Compared:  Compared to: 03/19/2024 Mammo Digital Diagnostic Left, 02/21/2024 Mammo Digital Diagnostic Left, 02/15/2024 Mammo Digital Screening Bilat w/ Trevor, and 07/07/2022 Mammo Digital Screening Bilat w/ Trevor     Findings:  This procedure was performed using tomosynthesis. Computer-aided detection was utilized in the interpretation of this examination.        There are scattered areas of fibroglandular density.      Left  There are post-surgical findings from a previous lumpectomy with radiation seen in the upper outer quadrant of the left breast in the posterior depth. There has been no interval development of a suspicious mass or  microcalcification. There is associated focal asymmetry with intervening fat and no identified mass, most compatible with post surgical change.     There are asymmetries within the lower and inner left breast on MLO and CC views which efface to normal fibroglandular tissue upon spot compression.      Right  There is no evidence of suspicious masses, calcifications, or other abnormal findings in the right breast.     Impression:  Left  Post-Surgical Finding: Left breast post-surgical finding in the upper outer quadrant in the posterior depth. Assessment: 3 - Probably benign. Short Interval Follow-Up in 6 Months is recommended.      Right  There is no mammographic evidence of malignancy in the right breast.     BI-RADS Category:   Overall: 3 - Probably Benign        Recommendation:  Short interval follow-up is recommended in 6 months.           MEDICATIONS/ALLERGIES:     Current Outpatient Medications:     anastrozole (ARIMIDEX) 1 mg Tab, Take 1 tablet (1 mg total) by mouth once daily., Disp: 90 tablet, Rfl: 3    ascorbic acid, vitamin C, (VITAMIN C) 500 MG tablet, Take 500 mg by mouth once daily., Disp: , Rfl:     b complex vitamins capsule, Take 1 capsule by mouth once daily., Disp: , Rfl:     ergocalciferol, vitamin D2, (VITAMIN D ORAL), Take by mouth., Disp: , Rfl:     magnesium hydroxide (MAGNESIA ORAL), Take by mouth., Disp: , Rfl:     NIACIN ORAL, Take by mouth., Disp: , Rfl:   Review of patient's allergies indicates:  No Known Allergies    PHYSICAL EXAM:   General: The patient appears well and is in no acute distress.     Chaperone is present for examination.   BREAST EXAM  No Asymmetry  Right:  - Mass: No  - Skin change: No  - Nipple Discharge: No  - Nipple retraction: No  - Axillary LAD: No  Left:   - Mass: No  - Skin change:  Well-healed post surgery and XRT changes  - Nipple Discharge: No  - Nipple retraction: No  - Axillary LAD: No      ASSESSMENT:   Diagnoses and all orders for this visit:    Malignant  neoplasm of upper-outer quadrant of left breast in female, estrogen receptor positive         PLAN:   Joyce has a benign exam today without evidence of local or distant relapse.     Imaging reviewed left BI-RADS 3 probably benign, six-month interval breast imaging   She will return in 6 months follow up visit, or sooner should she develop breast concerns.      The patient is in agreement with the plan. Questions were encouraged and answered to patient's satisfaction. Joyce will call our office with any questions or concerns.     I spent a total of 30 minutes on this visit. This includes face to face time and non-face to face time preparing to see the patient (eg, review of tests), obtaining and/or reviewing separately obtained history, documenting clinical information in the electronic or other health record, independently interpreting results and communicating results to the patient/family/caregiver, or care coordinator.       Fish Choudhury M.D.

## 2025-03-14 ENCOUNTER — PATIENT MESSAGE (OUTPATIENT)
Dept: HEMATOLOGY/ONCOLOGY | Facility: CLINIC | Age: 65
End: 2025-03-14
Payer: COMMERCIAL

## 2025-03-14 ENCOUNTER — TELEPHONE (OUTPATIENT)
Dept: HEMATOLOGY/ONCOLOGY | Facility: CLINIC | Age: 65
End: 2025-03-14
Payer: COMMERCIAL

## 2025-03-17 ENCOUNTER — TELEPHONE (OUTPATIENT)
Dept: HEMATOLOGY/ONCOLOGY | Facility: CLINIC | Age: 65
End: 2025-03-17
Payer: COMMERCIAL

## 2025-03-17 NOTE — TELEPHONE ENCOUNTER
Office Visit  3/13/2025  [facility] - Hematol Oncol 2nd Fl  Default Flowsheet Data (all recorded)    Distress Management    Row Name 03/13/25 1537 03/12/25 2004 02/21/25 0648     Distress Management Score   Distress Score 7 0 - No Distress (P)  0 - No Distress (P)      Physical Concerns   Physical Concerns Fatigue;Memory or concentration Changes in eating (P)  Changes in eating (P)      Emotional Concerns   Emotional Concerns None of these None of these (P)  None of these (P)      Social Concerns   Social Concerns None of these None of these (P)  None of these (P)      Practical Concerns   Practical Concerns None of these None of these (P)  None of these (P)      OTHER   Spiritual or Spiritism Concerns None of these None of these (P)  None of these (P)        SW attempted to reach pt to discuss recent distress score. No answer. Left vm. SW will remain available.       Pt later returned call to SW and stated that provider addressed the above and she has started iron supplements. No other needs at this time. SW introduced self/role and encouraged pt to call if needs arise. SW will remain available.

## 2025-04-07 NOTE — ASSESSMENT & PLAN NOTE
Cancer Staging   Malignant neoplasm of upper-outer quadrant of left breast in female, estrogen receptor positive  Staging form: Breast, AJCC 8th Edition  - Clinical stage from 2/25/2024: Stage IB (cT1b, cN1(f), cM0, G2, ER+, IN+, HER2-) - Signed by Lynda Bhandari MD on 2/25/2024  - Pathologic stage from 4/1/2024: Stage IB (pT2, pN1(sn), cM0, G2, ER+, IN+, HER2-) - Signed by Lynda Bhandari MD on 4/1/2024    s/p BCS 03/20/24  s/p Breast Radiation (05/27/2024 - 6/28/24)  Initiated on Arimidex 07/17/24  She has declined Prolia injections and Verzenio    Pt continues on arimidex daily without difficulty  She notes she has had f/u with breast surgery (Dr. Choudhury) 03/13/25    MMG 02/2025  Left--Post-Surgical Finding: Left breast post-surgical finding in the upper outer quadrant in the posterior depth. Assessment: 3 - Probably benign. Short Interval Follow-Up in 6 Months is recommended.    Right--There is no mammographic evidence of malignancy in the right breast.      F/u in 4 months with repeat labs prior

## 2025-04-15 ENCOUNTER — RESULTS FOLLOW-UP (OUTPATIENT)
Dept: FAMILY MEDICINE | Facility: CLINIC | Age: 65
End: 2025-04-15

## 2025-04-15 ENCOUNTER — OFFICE VISIT (OUTPATIENT)
Dept: FAMILY MEDICINE | Facility: CLINIC | Age: 65
End: 2025-04-15
Payer: MEDICARE

## 2025-04-15 ENCOUNTER — HOSPITAL ENCOUNTER (OUTPATIENT)
Dept: RADIOLOGY | Facility: HOSPITAL | Age: 65
Discharge: HOME | End: 2025-04-15
Attending: PHYSICIAN ASSISTANT
Payer: MEDICARE

## 2025-04-15 VITALS
BODY MASS INDEX: 28.36 KG/M2 | WEIGHT: 166.13 LBS | RESPIRATION RATE: 16 BRPM | HEIGHT: 64 IN | DIASTOLIC BLOOD PRESSURE: 82 MMHG | HEART RATE: 67 BPM | SYSTOLIC BLOOD PRESSURE: 136 MMHG | OXYGEN SATURATION: 99 %

## 2025-04-15 DIAGNOSIS — M79.642 LEFT HAND PAIN: ICD-10-CM

## 2025-04-15 DIAGNOSIS — M25.532 LEFT WRIST PAIN: ICD-10-CM

## 2025-04-15 DIAGNOSIS — M79.642 LEFT HAND PAIN: Primary | ICD-10-CM

## 2025-04-15 PROCEDURE — 73130 X-RAY EXAM OF HAND: CPT | Mod: TC,HCNC,PO,LT

## 2025-04-15 PROCEDURE — 99999 PR PBB SHADOW E&M-EST. PATIENT-LVL V: CPT | Mod: PBBFAC,HCNC,, | Performed by: PHYSICIAN ASSISTANT

## 2025-04-15 PROCEDURE — 73130 X-RAY EXAM OF HAND: CPT | Mod: 26,HCNC,LT, | Performed by: RADIOLOGY

## 2025-04-15 PROCEDURE — 99213 OFFICE O/P EST LOW 20 MIN: CPT | Mod: HCNC,S$GLB,, | Performed by: PHYSICIAN ASSISTANT

## 2025-04-15 PROCEDURE — 3075F SYST BP GE 130 - 139MM HG: CPT | Mod: HCNC,CPTII,S$GLB, | Performed by: PHYSICIAN ASSISTANT

## 2025-04-15 PROCEDURE — 73110 X-RAY EXAM OF WRIST: CPT | Mod: 26,HCNC,LT, | Performed by: RADIOLOGY

## 2025-04-15 PROCEDURE — 3079F DIAST BP 80-89 MM HG: CPT | Mod: HCNC,CPTII,S$GLB, | Performed by: PHYSICIAN ASSISTANT

## 2025-04-15 PROCEDURE — 3288F FALL RISK ASSESSMENT DOCD: CPT | Mod: HCNC,CPTII,S$GLB, | Performed by: PHYSICIAN ASSISTANT

## 2025-04-15 PROCEDURE — 73110 X-RAY EXAM OF WRIST: CPT | Mod: TC,HCNC,PO,LT

## 2025-04-15 PROCEDURE — 1101F PT FALLS ASSESS-DOCD LE1/YR: CPT | Mod: HCNC,CPTII,S$GLB, | Performed by: PHYSICIAN ASSISTANT

## 2025-04-15 PROCEDURE — 3008F BODY MASS INDEX DOCD: CPT | Mod: HCNC,CPTII,S$GLB, | Performed by: PHYSICIAN ASSISTANT

## 2025-04-15 PROCEDURE — 1160F RVW MEDS BY RX/DR IN RCRD: CPT | Mod: HCNC,CPTII,S$GLB, | Performed by: PHYSICIAN ASSISTANT

## 2025-04-15 PROCEDURE — 1159F MED LIST DOCD IN RCRD: CPT | Mod: HCNC,CPTII,S$GLB, | Performed by: PHYSICIAN ASSISTANT

## 2025-04-15 RX ORDER — FERROUS SULFATE 325(65) MG
325 TABLET ORAL
COMMUNITY

## 2025-04-15 NOTE — PROGRESS NOTES
Assessment/Plan:    1. Left hand pain  -     X-Ray Hand Complete Left; Future; Expected date: 04/15/2025    2. Left wrist pain  -     X-Ray Wrist Complete Left; Future; Expected date: 04/15/2025    -L hand/wrist pain after injury >5 weeks ago  -no alarm symptoms or signs present  -will obtain XRs today  -continue conservative treatment, rest, ice/heat applications, PRN anti-inflammatory medication  -consider ortho referral if symptoms persist  -ER precautions for severe or worsening of symptoms    Follow up if symptoms worsen or fail to improve.    Brooke Stauffer PA-C  _____________________________________________________________________________________________________________________________________________________    CC: left hand injury    HPI: Patient is in clinic today as an established patient here for left hand injury.     HISTORY OF PRESENT ILLNESS:  She presents with left hand injury that occurred on , approximately 5 weeks ago, after lifting a heavy statue. She reports pain and swelling localized to the dorsal aspect of the hand extending into the wrist. Symptoms progressively worsen throughout the day, with increased swelling and pain by nighttime, particularly with increased activity. She takes occasional aspirin for pain management and describes having good pain tolerance, characterizing the discomfort as more annoying than painful.    No other complaints today.     Past Medical History:   Diagnosis Date    Breast cancer     Left.  Radiation.    Cancer     GERD (gastroesophageal reflux disease)      Past Surgical History:   Procedure Laterality Date    BILATERAL TUBAL LIGATION      BREAST LUMPECTOMY Left      SECTION      X 2    INJECTION FOR SENTINEL NODE IDENTIFICATION Left 2024    Procedure: INJECTION, FOR SENTINEL NODE IDENTIFICATION;  Surgeon: Lynda Bhandari MD;  Location: [facility];  Service: General;  Laterality: Left;    LUMPECTOMY, WITH RADAR LOCALIZATION USING  GEETA  Left 03/20/2024    Procedure: LUMPECTOMY,WITH RADAR LOCALIZATION USING GEETA ;  Surgeon: Lynda Bhandari MD;  Location: [facility] OR;  Service: General;  Laterality: Left;  Left Lumpectomy Using Preoperatively Placed Radiographic Marker (Geeta )  Left Targeted Axillary Lymph Node Dissection  Injection of Isosulfan Blue Dye  Interpretation of Specimen Mammogram    MAPPING, LYMPH NODE, SENTINEL Left 03/20/2024    Procedure: MAPPING, LYMPH NODE, SENTINEL;  Surgeon: Lynda Bhandari MD;  Location: [facility] OR;  Service: General;  Laterality: Left;    SENTINEL LYMPH NODE BIOPSY Left 03/20/2024    Procedure: BIOPSY, LYMPH NODE, SENTINEL;  Surgeon: Lynda Bhandari MD;  Location: [facility];  Service: General;  Laterality: Left;    TUBAL LIGATION  1992     Review of patient's allergies indicates:  No Known Allergies  Social History[1]  Family History   Problem Relation Name Age of Onset    Breast cancer Mother Nathalie 84        inflammatory    Hypertension Mother Nathalie     Dementia Mother Nathalie     Hyperlipidemia Mother Nathalie     Cancer Mother Nathalie     Alcohol abuse Father Marlon     Breast cancer Sister Teetee 43        genetic testing negative (~2015)    Cancer Sister Teetee     No Known Problems Brother Marcelino     No Known Problems Brother Juan     Lung cancer Maternal Grandmother Minoo         +smoking hx    Heart disease Maternal Grandmother Minoo     Arthritis Maternal Grandmother Minoo     Cancer Maternal Grandmother Minoo     Heart disease Maternal Grandfather none     Breast cancer Maternal Cousin Nilsa 53        genetic testing negative (2015)    Breast cancer Paternal Cousin      Breast cancer Other Zofia      Medications Ordered Prior to Encounter[2]    Review of Systems   Constitutional:  Positive for activity change. Negative for chills, diaphoresis, fatigue, fever and unexpected weight change.   HENT:  Negative for congestion, ear pain, hearing loss, postnasal drip, rhinorrhea,  "sinus pain, sore throat and trouble swallowing.    Eyes:  Negative for pain, discharge, redness and visual disturbance.   Respiratory:  Negative for cough, chest tightness, shortness of breath and wheezing.    Cardiovascular:  Negative for chest pain, palpitations and leg swelling.   Gastrointestinal:  Negative for abdominal pain, blood in stool, constipation, diarrhea, nausea and vomiting.   Endocrine: Negative for polydipsia and polyuria.   Genitourinary:  Negative for difficulty urinating, dysuria, hematuria and menstrual problem.   Musculoskeletal:  Positive for arthralgias and joint swelling. Negative for neck pain.   Skin:  Negative for rash.   Neurological:  Negative for dizziness, syncope, weakness and headaches.   Psychiatric/Behavioral:  Negative for confusion and dysphoric mood. The patient is not nervous/anxious.        Vitals:    04/15/25 0853 04/15/25 0916   BP: (!) 145/86 136/82   Pulse: 67    Resp: 16    SpO2: 99%    Weight: 75.3 kg (166 lb 1.6 oz)    Height: 5' 4" (1.626 m)        Wt Readings from Last 3 Encounters:   04/15/25 75.3 kg (166 lb 1.6 oz)   03/13/25 75.6 kg (166 lb 10.7 oz)   03/13/25 75.5 kg (166 lb 7.2 oz)       Physical Exam  Constitutional:       General: She is not in acute distress.     Appearance: Normal appearance. She is well-developed.   HENT:      Head: Normocephalic and atraumatic.   Eyes:      Conjunctiva/sclera: Conjunctivae normal.   Cardiovascular:      Rate and Rhythm: Normal rate and regular rhythm.      Pulses: Normal pulses.      Heart sounds: Normal heart sounds. No murmur heard.  Pulmonary:      Effort: Pulmonary effort is normal. No respiratory distress.      Breath sounds: Normal breath sounds.   Abdominal:      General: Bowel sounds are normal. There is no distension.      Palpations: Abdomen is soft.      Tenderness: There is no abdominal tenderness.   Musculoskeletal:      Left wrist: Tenderness present. No swelling or deformity.      Left hand: Tenderness " (dorsal aspect) present. No swelling. Normal range of motion.      Cervical back: Normal range of motion and neck supple.   Skin:     General: Skin is warm and dry.      Findings: No rash.   Neurological:      General: No focal deficit present.      Mental Status: She is alert and oriented to person, place, and time.   Psychiatric:         Mood and Affect: Mood normal.         Behavior: Behavior normal.         Health Maintenance   Topic Date Due    Hepatitis C Screening  Never done    Lipid Panel  Never done    COVID-19 Vaccine (1) Never done    HIV Screening  Never done    TETANUS VACCINE  Never done    Shingles Vaccine (1 of 2) Never done    Pneumococcal Vaccines (Age 50+) (1 of 2 - PCV) Never done    Hemoglobin A1c (Diabetic Prevention Screening)  Never done    RSV Vaccine (Age 60+ and Pregnant patients) (1 - Risk 60-74 years 1-dose series) Never done    Influenza Vaccine (1) 09/01/2024    Mammogram  02/21/2026    DEXA Scan  07/10/2027    Colorectal Cancer Screening  07/08/2030     DISCLAIMER: This note was compiled by using a speech recognition dictation system and therefore please be aware that typographical / speech recognition errors can and do occur.  Please contact me if you see any errors specifically.  Consent was obtained for [employer] recording system prior to the visit.          [1]   Social History  Tobacco Use    Smoking status: Never    Smokeless tobacco: Never   Substance Use Topics    Alcohol use: Yes     Alcohol/week: 2.0 standard drinks of alcohol     Types: 1 Glasses of wine, 1 Cans of beer per week     Comment: occasional only    Drug use: Never   [2]   Current Outpatient Medications on File Prior to Visit   Medication Sig Dispense Refill    anastrozole (ARIMIDEX) 1 mg Tab Take 1 tablet (1 mg total) by mouth once daily. 90 tablet 3    ascorbic acid, vitamin C, (VITAMIN C) 500 MG tablet Take 500 mg by mouth once daily.      b complex vitamins capsule Take 1 capsule by mouth once daily.       ergocalciferol, vitamin D2, (VITAMIN D ORAL) Take by mouth.      ferrous sulfate (FEOSOL) 325 mg (65 mg iron) Tab tablet Take 325 mg by mouth daily with breakfast.      magnesium hydroxide (MAGNESIA ORAL) Take by mouth.      NIACIN ORAL Take by mouth.       No current facility-administered medications on file prior to visit.

## 2025-04-15 NOTE — PROGRESS NOTES
Results have been released via [employer]. Please verify that these have been viewed by patient. If not, please call patient with results.     I have sent a message to them with the following interpretation (see below).    I have reviewed your recent left hand and wrist XR which showed mild degenerative changes seen at the 1st CMC and triscaphe joints. There was also a subchondral cyst seen on imaging. There was no evidence of a fracture. We can consider a referral to ortho due to persistent symptoms if needed.     Please do not hesitate to call or message with any additional questions or concerns.    Brooke Stauffer PA-C

## 2025-04-15 NOTE — PATIENT INSTRUCTIONS
Marko Cook,     If you are due for any health screening(s) below please notify me so we can arrange them to be ordered and scheduled. Most healthy patients at your age complete them, but you are free to accept or refuse.     If you can't do it, I'll definitely understand. If you can, I'd certainly appreciate it!    All of your core healthy metrics are met.

## 2025-04-16 ENCOUNTER — TELEPHONE (OUTPATIENT)
Dept: FAMILY MEDICINE | Facility: CLINIC | Age: 65
End: 2025-04-16
Payer: MEDICARE

## 2025-04-16 DIAGNOSIS — M25.532 LEFT WRIST PAIN: ICD-10-CM

## 2025-04-16 DIAGNOSIS — M79.642 LEFT HAND PAIN: Primary | ICD-10-CM

## 2025-04-16 NOTE — TELEPHONE ENCOUNTER
I have signed for the following orders AND/OR meds.  Please call the patient and ask the patient to schedule the testing AND/OR inform about any medications that were sent. Medications have been sent to pharmacy listed below      Orders Placed This Encounter   Procedures    Ambulatory referral/consult to Orthopedics     Standing Status:   Future     Expected Date:   4/23/2025     Expiration Date:   5/16/2026     Referral Priority:   Routine     Referral Type:   Consultation     Requested Specialty:   Orthopedic Surgery     Number of Visits Requested:   1              [id number] - [place], [place] - [address] [address] [facility] [address]  [address] [address] [facility] [address]  Phone: [phone] Fax: [phone]    [facility]  [address]  [place] [address]  Phone: [phone] Fax: [phone]    [facility]  [address] [address]  Phone: [phone] Fax: [phone]

## 2025-04-16 NOTE — TELEPHONE ENCOUNTER
----- Message from Bebe Toribio LPN sent at 4/16/2025  7:52 AM CDT -----      ----- Message -----  From: Brooke Stauffer PA-C  Sent: 4/15/2025   3:24 PM CDT  To: [facility] Staff    Results have been released via [employer]. Please verify that these have been viewed by patient. If not, please call patient with results.     I have sent a message to them with the following interpretation (see below).    I have reviewed your recent left hand and wrist XR which showed mild degenerative changes seen at the 1st CMC and triscaphe joints. There was also a subchondral cyst seen on imaging. There was no   evidence of a fracture. We can consider a referral to ortho due to persistent symptoms if needed.     Please do not hesitate to call or message with any additional questions or concerns.    Brooke Stauffer PA-C  ----- Message -----  From: Interface, Rad Results In  Sent: 4/15/2025   9:56 AM CDT  To: Brooke Stauffer PA-C

## 2025-04-23 ENCOUNTER — LAB VISIT (OUTPATIENT)
Dept: LAB | Facility: HOSPITAL | Age: 65
End: 2025-04-23
Attending: ORTHOPAEDIC SURGERY
Payer: MEDICARE

## 2025-04-23 ENCOUNTER — OFFICE VISIT (OUTPATIENT)
Dept: ORTHOPEDICS | Facility: CLINIC | Age: 65
End: 2025-04-23
Payer: MEDICARE

## 2025-04-23 VITALS — BODY MASS INDEX: 28.34 KG/M2 | WEIGHT: 166 LBS | HEIGHT: 64 IN

## 2025-04-23 DIAGNOSIS — M79.642 LEFT HAND PAIN: Primary | ICD-10-CM

## 2025-04-23 DIAGNOSIS — M79.642 LEFT HAND PAIN: ICD-10-CM

## 2025-04-23 DIAGNOSIS — I89.0 LYMPHEDEMA: Primary | ICD-10-CM

## 2025-04-23 DIAGNOSIS — M25.532 LEFT WRIST PAIN: ICD-10-CM

## 2025-04-23 LAB
ABSOLUTE EOSINOPHIL (OHS): 0.22 K/UL
ABSOLUTE MONOCYTE (OHS): 0.33 K/UL (ref 0.3–1)
ABSOLUTE NEUTROPHIL COUNT (OHS): 3.66 K/UL (ref 1.8–7.7)
BASOPHILS # BLD AUTO: 0.02 K/UL
BASOPHILS NFR BLD AUTO: 0.4 %
CRP SERPL-MCNC: 1 MG/L
ERYTHROCYTE [DISTWIDTH] IN BLOOD BY AUTOMATED COUNT: 12.6 % (ref 11.5–14.5)
ERYTHROCYTE [SEDIMENTATION RATE] IN BLOOD: 5 MM/HR
HCT VFR BLD AUTO: 41.5 % (ref 37–48.5)
HGB BLD-MCNC: 13.2 GM/DL (ref 12–16)
IMM GRANULOCYTES # BLD AUTO: 0.01 K/UL (ref 0–0.04)
IMM GRANULOCYTES NFR BLD AUTO: 0.2 % (ref 0–0.5)
LYMPHOCYTES # BLD AUTO: 0.71 K/UL (ref 1–4.8)
MCH RBC QN AUTO: 29.1 PG (ref 27–31)
MCHC RBC AUTO-ENTMCNC: 31.8 G/DL (ref 32–36)
MCV RBC AUTO: 91 FL (ref 82–98)
NUCLEATED RBC (/100WBC) (OHS): 0 /100 WBC
PLATELET # BLD AUTO: 168 K/UL (ref 150–450)
PMV BLD AUTO: 13.2 FL (ref 9.2–12.9)
RBC # BLD AUTO: 4.54 M/UL (ref 4–5.4)
RELATIVE EOSINOPHIL (OHS): 4.4 %
RELATIVE LYMPHOCYTE (OHS): 14.3 % (ref 18–48)
RELATIVE MONOCYTE (OHS): 6.7 % (ref 4–15)
RELATIVE NEUTROPHIL (OHS): 74 % (ref 38–73)
RHEUMATOID FACT SERPL-ACNC: <13 IU/ML
WBC # BLD AUTO: 4.95 K/UL (ref 3.9–12.7)

## 2025-04-23 PROCEDURE — 86235 NUCLEAR ANTIGEN ANTIBODY: CPT | Mod: 59,HCNC

## 2025-04-23 PROCEDURE — 99203 OFFICE O/P NEW LOW 30 MIN: CPT | Mod: HCNC,S$GLB,, | Performed by: ORTHOPAEDIC SURGERY

## 2025-04-23 PROCEDURE — 85025 COMPLETE CBC W/AUTO DIFF WBC: CPT | Mod: HCNC

## 2025-04-23 PROCEDURE — 86431 RHEUMATOID FACTOR QUANT: CPT | Mod: HCNC

## 2025-04-23 PROCEDURE — 85651 RBC SED RATE NONAUTOMATED: CPT | Mod: HCNC

## 2025-04-23 PROCEDURE — 86140 C-REACTIVE PROTEIN: CPT | Mod: HCNC

## 2025-04-23 PROCEDURE — 1159F MED LIST DOCD IN RCRD: CPT | Mod: HCNC,CPTII,S$GLB, | Performed by: ORTHOPAEDIC SURGERY

## 2025-04-23 PROCEDURE — 99999 PR PBB SHADOW E&M-EST. PATIENT-LVL III: CPT | Mod: PBBFAC,HCNC,, | Performed by: ORTHOPAEDIC SURGERY

## 2025-04-23 PROCEDURE — 3008F BODY MASS INDEX DOCD: CPT | Mod: HCNC,CPTII,S$GLB, | Performed by: ORTHOPAEDIC SURGERY

## 2025-04-23 PROCEDURE — 86235 NUCLEAR ANTIGEN ANTIBODY: CPT | Mod: HCNC

## 2025-04-23 PROCEDURE — 1160F RVW MEDS BY RX/DR IN RCRD: CPT | Mod: HCNC,CPTII,S$GLB, | Performed by: ORTHOPAEDIC SURGERY

## 2025-04-23 PROCEDURE — 1101F PT FALLS ASSESS-DOCD LE1/YR: CPT | Mod: HCNC,CPTII,S$GLB, | Performed by: ORTHOPAEDIC SURGERY

## 2025-04-23 PROCEDURE — 86225 DNA ANTIBODY NATIVE: CPT | Mod: HCNC

## 2025-04-23 PROCEDURE — 86039 ANTINUCLEAR ANTIBODIES (ANA): CPT | Mod: HCNC

## 2025-04-23 PROCEDURE — 36415 COLL VENOUS BLD VENIPUNCTURE: CPT | Mod: HCNC

## 2025-04-23 PROCEDURE — 3288F FALL RISK ASSESSMENT DOCD: CPT | Mod: HCNC,CPTII,S$GLB, | Performed by: ORTHOPAEDIC SURGERY

## 2025-04-23 NOTE — PROGRESS NOTES
Subjective:     Patient ID: Joyce Velarde is a 65 y.o. female.    Chief Complaint: Pain of the Left Hand      HPI:  The patient is a 65-year-old female who has swelling of the left hand of insidious onset.  She had a mastectomy and lymph node dissection 2024.  She has had slight difficulty with lymphedema in the past.  She only wishes to have reassurance there is nothing else going on other than that.  She does not use a lymphedema glove.    Past Medical History:   Diagnosis Date    Breast cancer     Left.  Radiation.    Cancer     GERD (gastroesophageal reflux disease)      Past Surgical History:   Procedure Laterality Date    BILATERAL TUBAL LIGATION      BREAST LUMPECTOMY Left      SECTION      X 2    INJECTION FOR SENTINEL NODE IDENTIFICATION Left 2024    Procedure: INJECTION, FOR SENTINEL NODE IDENTIFICATION;  Surgeon: Lynda Bhandari MD;  Location: [facility];  Service: General;  Laterality: Left;    LUMPECTOMY, WITH RADAR LOCALIZATION USING GEETA  Left 2024    Procedure: LUMPECTOMY,WITH RADAR LOCALIZATION USING GEETA ;  Surgeon: Lynda Bhandari MD;  Location: [facility] OR;  Service: General;  Laterality: Left;  Left Lumpectomy Using Preoperatively Placed Radiographic Marker (Geeta )  Left Targeted Axillary Lymph Node Dissection  Injection of Isosulfan Blue Dye  Interpretation of Specimen Mammogram    MAPPING, LYMPH NODE, SENTINEL Left 2024    Procedure: MAPPING, LYMPH NODE, SENTINEL;  Surgeon: Lynda Bhandari MD;  Location: [facility] OR;  Service: General;  Laterality: Left;    SENTINEL LYMPH NODE BIOPSY Left 2024    Procedure: BIOPSY, LYMPH NODE, SENTINEL;  Surgeon: Lynda Bhandari MD;  Location: [facility];  Service: General;  Laterality: Left;    TUBAL LIGATION       Family History   Problem Relation Name Age of Onset    Breast cancer Mother Nathalie 84        inflammatory    Hypertension Mother Nathalie     Dementia Mother Nathalie     Hyperlipidemia  Mother Nathalie     Cancer Mother Nathalie     Alcohol abuse Father Marlon     Breast cancer Sister Teetee 43        genetic testing negative (~2015)    Cancer Sister Teetee     No Known Problems Brother Marcelino     No Known Problems Brother Juan     Lung cancer Maternal Grandmother Minoo         +smoking hx    Heart disease Maternal Grandmother Minoo     Arthritis Maternal Grandmother Minoo     Cancer Maternal Grandmother Minoo     Heart disease Maternal Grandfather none     Breast cancer Maternal Cousin Nilsa 53        genetic testing negative (2015)    Breast cancer Paternal Cousin      Breast cancer Other Zofia      Social History[1]  Medication List with Changes/Refills   Current Medications    ANASTROZOLE (ARIMIDEX) 1 MG TAB    Take 1 tablet (1 mg total) by mouth once daily.    ASCORBIC ACID, VITAMIN C, (VITAMIN C) 500 MG TABLET    Take 500 mg by mouth once daily.    B COMPLEX VITAMINS CAPSULE    Take 1 capsule by mouth once daily.    ERGOCALCIFEROL, VITAMIN D2, (VITAMIN D ORAL)    Take by mouth.    FERROUS SULFATE (FEOSOL) 325 MG (65 MG IRON) TAB TABLET    Take 325 mg by mouth daily with breakfast.    MAGNESIUM HYDROXIDE (MAGNESIA ORAL)    Take by mouth.    NIACIN ORAL    Take by mouth.     Review of patient's allergies indicates:  No Known Allergies  Review of Systems   Constitutional: Negative for malaise/fatigue.   HENT:  Negative for hearing loss.    Eyes:  Negative for double vision and visual disturbance.   Cardiovascular:  Negative for chest pain.   Respiratory:  Negative for shortness of breath.    Endocrine: Negative for cold intolerance.   Hematologic/Lymphatic: Does not bruise/bleed easily.   Skin:  Negative for poor wound healing and suspicious lesions.   Musculoskeletal:  Positive for arthritis, joint pain and joint swelling. Negative for gout.   Gastrointestinal:  Negative for nausea and vomiting.   Genitourinary:  Negative for dysuria.   Neurological:  Negative for numbness, paresthesias and  sensory change.   Psychiatric/Behavioral:  Negative for depression, memory loss and substance abuse. The patient is not nervous/anxious.    Allergic/Immunologic: Negative for persistent infections.       Objective:   Body mass index is 28.49 kg/m².  There were no vitals filed for this visit.             General    Constitutional: She is oriented to person, place, and time. She appears well-developed and well-nourished. No distress.   HENT:   Head: Normocephalic.   Eyes: EOM are normal.   Pulmonary/Chest: Effort normal.   Neurological: She is oriented to person, place, and time.   Psychiatric: She has a normal mood and affect.         Left Hand/Wrist Exam     Inspection   Scars: Wrist - absent Hand -  absent  Effusion: Wrist - absent Hand -  present    Other     Sensory Exam  Median Distribution: normal  Ulnar Distribution: normal  Radial Distribution: normal    Comments:  The patient has slight swelling of the dorsal aspect of the left hand.  There is no tenderness.  There is full range of motion.          Vascular Exam       Capillary Refill  Left Hand: normal capillary refill          Relevant imaging results reviewed and interpreted by me, discussed with the patient and / or family today radiographs left hand showed early osteoarthritic change basal joint and subchondral cyst in the lunate  Assessment:     Encounter Diagnoses   Name Primary?    Left hand pain     Lymphedema Yes    Left upper extremity    Plan:     The patient was sent for rheumatologic workup to include sed rate rheumatoid factor JOSE MIGUEL and C-reactive protein.  If all these were negative I believe she can be assured that she has lymphedema and was referred to general surgery Dr. Martinez for a lymphedema glove recommendations which may be .                Disclaimer: This note was prepared using a voice recognition system and is likely to have sound alike errors within the text.          [1]   Social History  Socioeconomic History    Marital  status:    Tobacco Use    Smoking status: Never    Smokeless tobacco: Never   Substance and Sexual Activity    Alcohol use: Yes     Alcohol/week: 2.0 standard drinks of alcohol     Types: 1 Glasses of wine, 1 Cans of beer per week     Comment: occasional only    Drug use: Never    Sexual activity: Yes     Partners: Male     Birth control/protection: Post-menopausal     Social Drivers of Health     Financial Resource Strain: Low Risk  (2/17/2025)    Overall Financial Resource Strain (CARDIA)     Difficulty of Paying Living Expenses: Not hard at all   Food Insecurity: No Food Insecurity (2/17/2025)    Hunger Vital Sign     Worried About Running Out of Food in the Last Year: Never true     Ran Out of Food in the Last Year: Never true   Transportation Needs: No Transportation Needs (2/17/2025)    PRAPARE - Transportation     Lack of Transportation (Medical): No     Lack of Transportation (Non-Medical): No   Physical Activity: Sufficiently Active (2/17/2025)    Exercise Vital Sign     Days of Exercise per Week: 4 days     Minutes of Exercise per Session: 40 min   Recent Concern: Physical Activity - Insufficiently Active (11/19/2024)    Received from [facility]    Exercise Vital Sign     Days of Exercise per Week: 2 days     Minutes of Exercise per Session: 60 min   Stress: No Stress Concern Present (2/17/2025)    [employer] - Occupational Stress Questionnaire     Feeling of Stress : Not at all   Housing Stability: Low Risk  (2/17/2025)    Housing Stability Vital Sign     Unable to Pay for Housing in the Last Year: No     Number of Times Moved in the Last Year: 0     Homeless in the Last Year: No

## 2025-04-24 LAB
ANA (OHS): POSITIVE
ANA PATTERN 1 (OHS): ABNORMAL
ANA TITER 1 (OHS): ABNORMAL

## 2025-04-25 LAB
DSDNA ANTIBODY (OHS): NORMAL
DSDNA ANTIBODY TITER (OHS): NORMAL

## 2025-04-28 LAB
SM  ANTIBODY (OHS): 0.07 RATIO
SM INTERPRETATION (OHS): NEGATIVE
SM/RNP ANTIBODY (OHS): 0.07 RATIO
SM/RNP INTERPRETATION (OHS): NEGATIVE
SSA  ANTIBODY (OHS): 0.07 RATIO (ref 0–0.99)
SSA INTERPRETATION (OHS): NEGATIVE
SSB  ANTIBODY (OHS): 0.08 RATIO
SSB INTERPRETATION (OHS): NEGATIVE

## 2025-06-13 ENCOUNTER — LAB VISIT (OUTPATIENT)
Dept: LAB | Facility: HOSPITAL | Age: 65
End: 2025-06-13
Attending: INTERNAL MEDICINE
Payer: MEDICARE

## 2025-06-13 DIAGNOSIS — C77.3 MALIGNANT NEOPLASM METASTATIC TO LYMPH NODE OF AXILLA: ICD-10-CM

## 2025-06-13 LAB
ABSOLUTE EOSINOPHIL (OHS): 0.13 K/UL
ABSOLUTE MONOCYTE (OHS): 0.3 K/UL (ref 0.3–1)
ABSOLUTE NEUTROPHIL COUNT (OHS): 2.61 K/UL (ref 1.8–7.7)
ALBUMIN SERPL BCP-MCNC: 4.1 G/DL (ref 3.5–5.2)
ALP SERPL-CCNC: 63 UNIT/L (ref 40–150)
ALT SERPL W/O P-5'-P-CCNC: 14 UNIT/L (ref 10–44)
ANION GAP (OHS): 12 MMOL/L (ref 8–16)
AST SERPL-CCNC: 17 UNIT/L (ref 11–45)
BASOPHILS # BLD AUTO: 0.02 K/UL
BASOPHILS NFR BLD AUTO: 0.5 %
BILIRUB SERPL-MCNC: 0.4 MG/DL (ref 0.1–1)
BUN SERPL-MCNC: 12 MG/DL (ref 8–23)
CALCIUM SERPL-MCNC: 9.6 MG/DL (ref 8.7–10.5)
CHLORIDE SERPL-SCNC: 105 MMOL/L (ref 95–110)
CO2 SERPL-SCNC: 24 MMOL/L (ref 23–29)
CREAT SERPL-MCNC: 0.7 MG/DL (ref 0.5–1.4)
ERYTHROCYTE [DISTWIDTH] IN BLOOD BY AUTOMATED COUNT: 12 % (ref 11.5–14.5)
GFR SERPLBLD CREATININE-BSD FMLA CKD-EPI: >60 ML/MIN/1.73/M2
GLUCOSE SERPL-MCNC: 96 MG/DL (ref 70–110)
HCT VFR BLD AUTO: 42.2 % (ref 37–48.5)
HGB BLD-MCNC: 13.5 GM/DL (ref 12–16)
IMM GRANULOCYTES # BLD AUTO: 0.01 K/UL (ref 0–0.04)
IMM GRANULOCYTES NFR BLD AUTO: 0.3 % (ref 0–0.5)
LYMPHOCYTES # BLD AUTO: 0.82 K/UL (ref 1–4.8)
MAGNESIUM SERPL-MCNC: 1.8 MG/DL (ref 1.6–2.6)
MCH RBC QN AUTO: 28.4 PG (ref 27–31)
MCHC RBC AUTO-ENTMCNC: 32 G/DL (ref 32–36)
MCV RBC AUTO: 89 FL (ref 82–98)
NUCLEATED RBC (/100WBC) (OHS): 0 /100 WBC
PHOSPHATE SERPL-MCNC: 4 MG/DL (ref 2.7–4.5)
PLATELET # BLD AUTO: 169 K/UL (ref 150–450)
PMV BLD AUTO: 12.3 FL (ref 9.2–12.9)
POTASSIUM SERPL-SCNC: 3.9 MMOL/L (ref 3.5–5.1)
PROT SERPL-MCNC: 7.2 GM/DL (ref 6–8.4)
RBC # BLD AUTO: 4.75 M/UL (ref 4–5.4)
RELATIVE EOSINOPHIL (OHS): 3.3 %
RELATIVE LYMPHOCYTE (OHS): 21.1 % (ref 18–48)
RELATIVE MONOCYTE (OHS): 7.7 % (ref 4–15)
RELATIVE NEUTROPHIL (OHS): 67.1 % (ref 38–73)
SODIUM SERPL-SCNC: 141 MMOL/L (ref 136–145)
WBC # BLD AUTO: 3.89 K/UL (ref 3.9–12.7)

## 2025-06-13 PROCEDURE — 85025 COMPLETE CBC W/AUTO DIFF WBC: CPT | Mod: HCNC

## 2025-06-13 PROCEDURE — 80053 COMPREHEN METABOLIC PANEL: CPT | Mod: HCNC

## 2025-06-13 PROCEDURE — 84100 ASSAY OF PHOSPHORUS: CPT | Mod: HCNC

## 2025-06-13 PROCEDURE — 83735 ASSAY OF MAGNESIUM: CPT | Mod: HCNC

## 2025-06-13 PROCEDURE — 36415 COLL VENOUS BLD VENIPUNCTURE: CPT | Mod: HCNC

## 2025-06-18 ENCOUNTER — OFFICE VISIT (OUTPATIENT)
Dept: HEMATOLOGY/ONCOLOGY | Facility: CLINIC | Age: 65
End: 2025-06-18
Payer: MEDICARE

## 2025-06-18 VITALS
HEIGHT: 64 IN | WEIGHT: 158.38 LBS | BODY MASS INDEX: 27.04 KG/M2 | TEMPERATURE: 97 F | DIASTOLIC BLOOD PRESSURE: 75 MMHG | SYSTOLIC BLOOD PRESSURE: 115 MMHG | HEART RATE: 78 BPM | OXYGEN SATURATION: 99 %

## 2025-06-18 DIAGNOSIS — Z79.811 AROMATASE INHIBITOR USE: Primary | ICD-10-CM

## 2025-06-18 DIAGNOSIS — Z17.0 MALIGNANT NEOPLASM OF UPPER-OUTER QUADRANT OF LEFT BREAST IN FEMALE, ESTROGEN RECEPTOR POSITIVE: ICD-10-CM

## 2025-06-18 DIAGNOSIS — C50.412 MALIGNANT NEOPLASM OF UPPER-OUTER QUADRANT OF LEFT BREAST IN FEMALE, ESTROGEN RECEPTOR POSITIVE: ICD-10-CM

## 2025-06-18 PROCEDURE — 99999 PR PBB SHADOW E&M-EST. PATIENT-LVL III: CPT | Mod: PBBFAC,HCNC,, | Performed by: INTERNAL MEDICINE

## 2025-06-18 RX ORDER — PHENTERMINE HYDROCHLORIDE 37.5 MG/1
37.5 TABLET ORAL
COMMUNITY
Start: 2025-05-21

## 2025-06-18 NOTE — PROGRESS NOTES
Patient ID: Joyce Velarde   Reason for Visit: Follow-up  MRN:  [id number]     Oncologic Diagnosis:    L Stage IIB (cT1b cN1 Mx) (pT2 pN1a M0) Breast Invasive Mammary Carcinoma, G2, +/+/-, ki-67 40%  DCIS, G2  L Stage IB (cT1b cN1 Mx) Breast Invasive Mammary Carcinoma, G2, +/+/-, ki-67 40%  Oncotype 13  Previous Treatment:    s/p BCS 03/20/24  s/p Breast Radiation (05/27/2024 - 6/28/24)  Current Treatment:  Endocrine therapy (07/17/24 - ); declined abemaciclib and Prolia    Subjective   The patient presents for follow up.    She reports compliance with AI.  She is tolerating it very well; she has some concentration issues but no other side effects. She has felt a small swelling beneath left breast that has been there for 2 weeks.  She denies nipple discharge/changes, changes in size/shape of breasts, breast skin changes, breast pain, and breast or axillary lumps.  She also has had some mild swelling in her left arm and hand and counseled on compression sleeve use.  Offered lymphedema clinic referral however she would like to try OTC compression sleeve first.  Otherwise, she is eating a balanced diet and exercising.      She has no acute complaints.     Review of Systems   Constitutional:  Negative for activity change, appetite change, chills, diaphoresis, fatigue, fever and unexpected weight change.   HENT:  Negative for nosebleeds.    Respiratory:  Negative for shortness of breath.    Cardiovascular:  Negative for chest pain.   Gastrointestinal:  Negative for abdominal distention, abdominal pain, anal bleeding, blood in stool, constipation, diarrhea, nausea and vomiting.   Genitourinary:  Negative for difficulty urinating and hematuria.   Musculoskeletal:  Negative for arthralgias, back pain and myalgias.   Skin:  Negative for rash.   Neurological:  Negative for dizziness, weakness, light-headedness and headaches.   Hematological:  Does not bruise/bleed easily.   Psychiatric/Behavioral:  Positive for  decreased concentration. The patient is not nervous/anxious.      History   Previous HPI  Joyce Velarde is a 64 y.o. female who presents to clinic to establish care on referral for newly diagnosed breast cancer.    The patient had left breast abnormality detected on screening mammogram in suspicious appearing lymph node.  Biopsy resulted as positive for invasive mammary carcinoma in the breast, and the lymph nodes positive as well.  The patient denies any breast signs or symptoms.  She has a significant family history of malignancy in his already been referred to the genetic counselor.    She has no acute complaints today.  I reviewed her biopsy pathology as well as imaging.  Thankfully there is no signs of distant metastatic disease.  Discussed with her Oncotype DX testing and how it we will form a treatment decision.  I also discussed that depending on final pathology the test may be moot.  If she has other lymph nodes positive I will be recommending chemotherapy even if she has a lower Oncotype score.  She is in agreement to comply with whatever her treatment team recommends.      Oncology History   Malignant neoplasm of upper-outer quadrant of left breast in female, estrogen receptor positive   2/25/2024 Initial Diagnosis    Malignant neoplasm of upper-outer quadrant of left breast in female, estrogen receptor positive     2/25/2024 Cancer Staged    Staging form: Breast, AJCC 8th Edition  - Clinical stage from 2/25/2024: Stage IB (cT1b, cN1(f), cM0, G2, ER+, WY+, HER2-)     4/1/2024 Cancer Staged    Staging form: Breast, AJCC 8th Edition  - Pathologic stage from 4/1/2024: Stage IB (pT2, pN1(sn), cM0, G2, ER+, WY+, HER2-)      Radiation Therapy    C50.412 - Malignant neoplasm of upper-outer quadrant of left female breast, Diagnosed 5/14/2024 (Active) Stage IB, T2, pN1a, M0, G2, HER2 Neg, ER Pos, WY P    64-year-old otherwise healthy woman with a pT2 N1a M0 grade 2 status post lumpectomy and sentinel lymph  node biopsy, 2.5 x 1.6 x 1.2 cm tumor was recovered, resected with a positive medial margin in the main specimen with negative separately submitted margin, and all other margins measuring at least 2 mm.  No lymphovascular invasion appreciated.  Associated DCIS measured up to 2 cm focally, positive for EIC, grade 2, with 7 mm anterior margin, remaining margins at least 2 mm.  2/3 sentinel lymph nodes contained disease, measuring up to 5 mm without NAVA, and with 1 additional negative node.  She has good disease insight      Treatment Summary she was treated to the whole left breast using an image guided 6 mV photon DIBH VMAT IMRT plan treating the chest wall and comprehensive regional lymphatics to 50 Gy in 25 fractions from 2024 to 2024         Past Surgical History:   Procedure Laterality Date    BILATERAL TUBAL LIGATION      BREAST LUMPECTOMY Left      SECTION      X 2    INJECTION FOR SENTINEL NODE IDENTIFICATION Left 2024    Procedure: INJECTION, FOR SENTINEL NODE IDENTIFICATION;  Surgeon: Lynda Bhandari MD;  Location: [facility];  Service: General;  Laterality: Left;    LUMPECTOMY, WITH RADAR LOCALIZATION USING GEETA  Left 2024    Procedure: LUMPECTOMY,WITH RADAR LOCALIZATION USING GEETA ;  Surgeon: Lynda Bhandari MD;  Location: [facility];  Service: General;  Laterality: Left;  Left Lumpectomy Using Preoperatively Placed Radiographic Marker (Geeta )  Left Targeted Axillary Lymph Node Dissection  Injection of Isosulfan Blue Dye  Interpretation of Specimen Mammogram    MAPPING, LYMPH NODE, SENTINEL Left 2024    Procedure: MAPPING, LYMPH NODE, SENTINEL;  Surgeon: Lynda Bhandari MD;  Location: [facility] OR;  Service: General;  Laterality: Left;    SENTINEL LYMPH NODE BIOPSY Left 2024    Procedure: BIOPSY, LYMPH NODE, SENTINEL;  Surgeon: Lynda Bhandari MD;  Location: [facility] OR;  Service: General;  Laterality: Left;    TUBAL LIGATION  1992  "      Family History   Problem Relation Name Age of Onset    Breast cancer Mother Nathalie 84        inflammatory    Hypertension Mother Nathalie     Dementia Mother Nathalie     Hyperlipidemia Mother Nathalie     Cancer Mother Nathalie     Alcohol abuse Father Marlon     Breast cancer Sister Teetee 43        genetic testing negative (~2015)    Cancer Sister Teetee     No Known Problems Brother Marcelino     No Known Problems Brother Juan     Lung cancer Maternal Grandmother Minoo         +smoking hx    Heart disease Maternal Grandmother Minoo     Arthritis Maternal Grandmother Minoo     Cancer Maternal Grandmother Minoo     Heart disease Maternal Grandfather none     Breast cancer Maternal Cousin Nilsa 53        genetic testing negative (2015)    Breast cancer Paternal Cousin      Breast cancer Other Zofia        Review of patient's allergies indicates:  No Known Allergies    Social History     Tobacco Use    Smoking status: Never    Smokeless tobacco: Never   Substance Use Topics    Alcohol use: Yes     Alcohol/week: 2.0 standard drinks of alcohol     Types: 1 Glasses of wine, 1 Cans of beer per week     Comment: occasional only    Drug use: Never       Physical Exam   ECOG:   ECOG SCORE              Vitals:  /75 (BP Location: Right arm, Patient Position: Sitting)   Pulse 78   Temp 97.1 °F (36.2 °C) (Temporal)   Ht 5' 4" (1.626 m)   Wt 71.9 kg (158 lb 6.4 oz)   SpO2 99%   BMI 27.19 kg/m²     Physical Exam  Constitutional:       General: She is not in acute distress.     Appearance: Normal appearance. She is not ill-appearing.   HENT:      Head: Normocephalic and atraumatic.   Eyes:      Extraocular Movements: Extraocular movements intact.      Conjunctiva/sclera: Conjunctivae normal.   Cardiovascular:      Rate and Rhythm: Normal rate and regular rhythm.      Heart sounds: No murmur heard.  Pulmonary:      Effort: Pulmonary effort is normal. No respiratory distress.   Chest:      Chest wall: No mass, " lacerations, deformity, swelling or tenderness.   Breasts:     Right: Normal.      Left: No swelling, bleeding, inverted nipple, mass, nipple discharge or skin change.          Comments: Soft swelling beneath the left breast  Abdominal:      Palpations: There is no hepatomegaly or splenomegaly.   Musculoskeletal:      Cervical back: Neck supple. No tenderness.   Lymphadenopathy:      Cervical: No cervical adenopathy.      Upper Body:      Right upper body: No supraclavicular or axillary adenopathy.      Left upper body: No supraclavicular or axillary adenopathy.   Skin:     Findings: No rash.   Neurological:      General: No focal deficit present.      Mental Status: She is alert and oriented to person, place, and time.   Psychiatric:         Mood and Affect: Mood normal.         Behavior: Behavior normal.         Thought Content: Thought content normal.        Labs   Labs:  No visits with results within 2 Day(s) from this visit.   Latest known visit with results is:   Lab Visit on 06/13/2025   Component Date Value Ref Range Status    Phosphorus Level 06/13/2025 4.0  2.7 - 4.5 mg/dL Final    Magnesium  06/13/2025 1.8  1.6 - 2.6 mg/dL Final    Sodium 06/13/2025 141  136 - 145 mmol/L Final    Potassium 06/13/2025 3.9  3.5 - 5.1 mmol/L Final    Chloride 06/13/2025 105  95 - 110 mmol/L Final    CO2 06/13/2025 24  23 - 29 mmol/L Final    Glucose 06/13/2025 96  70 - 110 mg/dL Final    BUN 06/13/2025 12  8 - 23 mg/dL Final    Creatinine 06/13/2025 0.7  0.5 - 1.4 mg/dL Final    Calcium 06/13/2025 9.6  8.7 - 10.5 mg/dL Final    Protein Total 06/13/2025 7.2  6.0 - 8.4 gm/dL Final    Albumin 06/13/2025 4.1  3.5 - 5.2 g/dL Final    Bilirubin Total 06/13/2025 0.4  0.1 - 1.0 mg/dL Final    For infants and newborns, interpretation of results should be based   on gestational age, weight and in agreement with clinical   observations.    Premature Infant recommended reference ranges:   0-24 hours:  <8.0 mg/dL   24-48 hours: <12.0  mg/dL   3-5 days:    <15.0 mg/dL   6-29 days:   <15.0 mg/dL    ALP 06/13/2025 63  40 - 150 unit/L Final    AST 06/13/2025 17  11 - 45 unit/L Final    ALT 06/13/2025 14  10 - 44 unit/L Final    Anion Gap 06/13/2025 12  8 - 16 mmol/L Final    eGFR 06/13/2025 >60  >60 mL/min/1.73/m2 Final    Estimated GFR calculated using the CKD-EPI creatinine (2021) equation.    WBC 06/13/2025 3.89 (L)  3.90 - 12.70 K/uL Final    RBC 06/13/2025 4.75  4.00 - 5.40 M/uL Final    HGB 06/13/2025 13.5  12.0 - 16.0 gm/dL Final    HCT 06/13/2025 42.2  37.0 - 48.5 % Final    MCV 06/13/2025 89  82 - 98 fL Final    MCH 06/13/2025 28.4  27.0 - 31.0 pg Final    MCHC 06/13/2025 32.0  32.0 - 36.0 g/dL Final    RDW 06/13/2025 12.0  11.5 - 14.5 % Final    Platelet Count 06/13/2025 169  150 - 450 K/uL Final    MPV 06/13/2025 12.3  9.2 - 12.9 fL Final    Nucleated RBC 06/13/2025 0  <=0 /100 WBC Final    Neut % 06/13/2025 67.1  38 - 73 % Final    Lymph % 06/13/2025 21.1  18 - 48 % Final    Mono % 06/13/2025 7.7  4 - 15 % Final    Eos % 06/13/2025 3.3  <=8 % Final    Basophil % 06/13/2025 0.5  <=1.9 % Final    Imm Grans % 06/13/2025 0.3  0.0 - 0.5 % Final    Neut # 06/13/2025 2.61  1.8 - 7.7 K/uL Final    Lymph # 06/13/2025 0.82 (L)  1 - 4.8 K/uL Final    Mono # 06/13/2025 0.30  0.3 - 1 K/uL Final    Eos # 06/13/2025 0.13  <=0.5 K/uL Final    Baso # 06/13/2025 0.02  <=0.2 K/uL Final    Imm Grans # 06/13/2025 0.01  0.00 - 0.04 K/uL Final    Mild elevation in immature granulocytes is non specific and can be seen in a variety of conditions including stress response, acute inflammation, trauma and pregnancy. Correlation with other laboratory and clinical findings is essential.        Pathology   Specimen to Pathology, Radiology Breast, needle biopsy - 02/21/2024      Component 3 wk ago   Final Pathologic Diagnosis     Part 1  Breast, left, 1 o'clock position, ultrasound-guided biopsy:  Invasive mammary carcinoma, moderately differentiated  Kelin grade  2:  Tubule formation-3, nuclear pleomorphism-2 and mitotic count -1  Invasive carcinoma measures 7 mm in greatest dimension  No carcinoma in-situ or lymphovascular invasion is identified  All submitted cores are involved by invasive carcinoma      Part 2  Axilla, left, ultrasound-guided biopsy:  Positive for metastatic carcinoma  Metastatic focus measures 4.5 mm in greatest dimension  Extranodal extension not definitively identified    ** tumor biomarkers and additional stains will be performed with results provided in a supplemental report.    This case was reviewed by Dr. RODNEY Hankins who concurs with the above diagnoses. VC      Comment: Interp By Geeta Saunders M.D., Signed on 02/26/2024 at 11:35   Supplemental Diagnosis     Tumor biomarkers  Estrogen receptor (ER): Positive, 90-95%, strong  Progesterone receptor (PGR): Positive, 20-30%, weak to intermediate  HER2 (IHC):  Negative, 0  Ki-67: 40%    Additional biomarkers:  AE1/AE3: Highlights the extent of invasive carcinoma  E cadherin: Displays strong membraneous staining, supporting ductal differentiation  P63:  Displays an absence of staining for myoepithelial cells, supporting the diagnosis of invasive carcinoma    All immunostains were performed with appropriate controls. VC      Gross     Pathology ID:  [id number]  Patient ID:  [id number]  Received in 2 parts:  Part 1:  Pathology ID:  [id number]  Patient ID:  [id number]  The specimen is received in formalin labeled &quot;left breast 1:00 in formalin 8:56&quot;.  The specimen consists of multiple yellow needle biopsy fragments measuring 1.7 x 1.1 x 0.1 cm in aggregate.  The specimen is submitted entirely in cassette  FXL--1-A.    Specimen has been in formalin for more than 6 hours and less than 72 hours.  Ischemic time is not provided.    Part 2:  Pathology ID:  [id number]  Patient ID:  [id number]  The specimen is received in formalin labeled &quot;left axilla in formalin 9:16&quot;.  The specimen consists of 5 tan  needle biopsy fragments measuring 1.4 x 0.8 x 0.1 cm in aggregate.  The specimen is submitted entirely in cassette QFV--2-A.    Specimen has been in formalin for more than 6 hours and less than 72 hours.  Ischemic time is not provided.  Raphael Lemus                Imaging   CT CHEST ABDOMEN PELVIS WITH IV CONTRAST (XPD) - 03/07/2024  TECHNIQUE:  Low dose axial images, sagittal and coronal reformations were obtained from the thoracic inlet to the pubic symphysis following the IV administration of 75 mL of Omnipaque 350 .  Oral contrast administered.  COMPARISON:  None     FINDINGS:  Chest:  Heart and great vessels: Mild atherosclerotic calcification  Adenopathy: No pathologically enlarged axillary, mediastinal or hilar lymph nodes.  Lungs: No concerning abnormality.  Abdomen:  Liver: Multiple small hypodensities, too small to characterize but likely cysts or hemangiomas.  Gallbladder and biliary: Unremarkable.  Spleen: Unremarkable.  Pancreas: Unremarkable.  Adrenals: Unremarkable.  Kidneys: Unremarkable.  Stomach/Bowel: Stomach is unremarkable.  Small bowel nonobstructive.  No concerning colonic abnormality.  Peritoneum: No ascites or pneumoperitoneum.  Abdominal Adenopathy: Central mesenteric fat stranding present with multiple small to upper limits of normal mesenteric lymph nodes.  Vasculature: Mild atherosclerotic plaquing present.  Pelvis:  Urinary bladder: Unremarkable.  Pelvis adenopathy: None.  Bones: No acute abnormality.  Well-circumscribed T3 lucent focus, suspected hemangioma.  Miscellaneous: Known left breast malignancy noted.  Biopsy clip noted within small left axillary lymph node as well.     Impression:  Known left breast malignancy without definite distant metastatic disease.  Central mesenteric fat stranding with small to upper limits normal lymph nodes.  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  Finding can  also be idiopathic or associated with lymphoma and other entities. Lymphoma and other entities felt less likely.  Attention on follow-up.      NM BONE SCAN WHOLE BODY - 03/07/2024  CLINICAL HISTORY:  Breast cancer, invasive, stage I/II/III, initial workup;  Secondary and unspecified malignant neoplasm of axilla and upper limb lymph nodes     TECHNIQUE:  Following the IV administration of 20 mCi of Tc-99m-MDP, anterior and posterior delayed whole body images were acquired.     COMPARISON:  None.     FINDINGS:  There is physiologic distribution of the radiopharmaceutical throughout the skeleton. Both kidneys and the bladder appear normal.     Impression:  There is no scintigraphic evidence of metastatic disease.    Assessment and Plan   L Stage IIB (cT1b cN1 Mx) (pT2 pN1a M0) Breast Invasive Mammary Carcinoma, G2, +/+/-, ki-67 40%  DCIS, G2  Aromatase Inhibitor Use  TB Presentation 03/07/2024: consensus for upfront surgery, genetic testing, systemic imaging, post op radiation and additional observation  Staging CT CAP and Bone Scan showed no evidence of metastatic disease  s/p L Breast Lumpectomy 03/20/2024:   Final pathology significant for tumor size 2.5 x 1.6 x 1.2 cm,  2/4 + LN, G2 DCIS  Discussed final pathology with significance for lymph node positivity and concern for high risk of recurrence. Oncotype Dx 13 with no benefit for chemotherapy.  Given low oncotype score it is reasonable to forego chemotherapy but I would recommend two years of adjuvant abemaciclib in combination with endocrine therapy after radiation given lymph node positive disease.  Patient opted to start endocrine therapy but declined Verzenio  MMG 02/21/2025 showed BIRADS 3 findings in the left breast with recommendation for 6 month follow up  Repeat MMG ordered for Aug 2025  Continue with AI  If swelling beneath left breast still present in 2 weeks, will obtain US      Osteopenia  Last DEXA 07/2022 showed osteopenia  Repeat DEXA July 2024  showed osteopenia  Recommend we start Prolia however she declined  Continue with Ca/VitD supplementation and weight bearing exercise      Cancer Screening  PAP Smear: Due and scheduled in May 2024  Colonoscopy 06/29/2020: patient reports she had a few polyps; not sure when she was due for another one; will refer to GI for colonoscopy after she has completed treatment for breast cancer        Med Onc Chart Routing      Follow up with physician    Follow up with WILL 6 months. CBE   Infusion scheduling note    Injection scheduling note    Labs    Imaging    Pharmacy appointment    Other referrals                The patient was seen, interviewed and examined. Pertinent lab and radiologic studies were reviewed. Pt instructed to call should they develop concerning signs/symptoms or have further questions.        Portions of the record may have been created with voice recognition software. Occasional wrong-word or sound-a-like substitutions may have occurred due to the inherent limitations of voice recognition software. Read the chart carefully and recognize, using context, where substitutions have occurred.      Sandi Tran MD    Hematology/Oncology

## 2025-07-30 ENCOUNTER — OFFICE VISIT (OUTPATIENT)
Dept: RADIATION ONCOLOGY | Facility: CLINIC | Age: 65
End: 2025-07-30
Payer: MEDICARE

## 2025-07-30 VITALS
WEIGHT: 155.63 LBS | BODY MASS INDEX: 26.72 KG/M2 | TEMPERATURE: 98 F | DIASTOLIC BLOOD PRESSURE: 91 MMHG | RESPIRATION RATE: 20 BRPM | OXYGEN SATURATION: 98 % | HEART RATE: 78 BPM | SYSTOLIC BLOOD PRESSURE: 150 MMHG

## 2025-07-30 DIAGNOSIS — Z17.0 MALIGNANT NEOPLASM OF UPPER-OUTER QUADRANT OF LEFT BREAST IN FEMALE, ESTROGEN RECEPTOR POSITIVE: Primary | ICD-10-CM

## 2025-07-30 DIAGNOSIS — C50.412 MALIGNANT NEOPLASM OF UPPER-OUTER QUADRANT OF LEFT BREAST IN FEMALE, ESTROGEN RECEPTOR POSITIVE: Primary | ICD-10-CM

## 2025-07-30 PROCEDURE — 99999 PR PBB SHADOW E&M-EST. PATIENT-LVL III: CPT | Mod: PBBFAC,HCNC,, | Performed by: SPECIALIST

## 2025-07-30 NOTE — PROGRESS NOTES
[facility] / [facility] - Radiation Oncology Follow Up Note    HISTORY OF PRESENT ILLNESS:  C50.412:  64-year-old otherwise healthy woman with a pT2 N1a M0 grade 2 ER ID positive HER2 negative upper-outer left breast cancer status post breast conservation recovering a 2.5 cm lesion resected with positive medial margin on the main specimen but negative separately submitted margin, and all other margins at least 2 mm.  No LVI.  Associated DCIS measured up to 2 cm focally, positive for EIC, grade 2, resected with negative margin.  2/3 sentinel lymph nodes contained disease measuring up to 5 mm without NAVA, and with 1 additional negative node.      She completed whole breast and regional radiotherapy on 06/28/2024     INTERVAL HISTORY:  She returns for routine six-month follow up.  She reports interval development of modest left upper extremity waxing and waning lymphedema and dependent edema in the inferior breast.  Otherwise without complaints related to therapy are worrisome for local regional or metastatic recurrence    Arimidex is ongoing and well tolerated    She underwent bilateral diagnostic mammography on 02/21/2025, BI-RADS 3, probably benign, showing posttherapeutic change in the affected left breast      PHYSICAL EXAMINATION:  Vitals:    07/30/25 0927   BP: (!) 150/91   BP Location: Right arm   Patient Position: Sitting   Pulse: 78   Resp: 20   Temp: 98.1 °F (36.7 °C)   TempSrc: Temporal   SpO2: 98%   Weight: 70.6 kg (155 lb 10.3 oz)      General:  A&O x4, NAD, no robson lymphedema apparent in the upper extremity  HEENT:  PEERLA, CN II-XII intact, EOM intact,   Lymphatics:  no cervical/sclav LAD  Lungs:  CTAB  Heart:  RRR  Breasts:  Near perfect symmetry of contour with a laterally deviated nipple-areolar complex in the affected left breast which is also moderately hyperpigmented.  No nipple discharge dimpling or worrisome pigmentary change bilaterally.  Both breasts are soft and a  nodular with negative axillae  Abdomen:  NTND +BS      ASSESSMENT:  Clinically and mammographically RICKIE with ongoing Arimidex.  Waxing and waning lymphedema      PLAN:  She will begin home PT for her lymphedema.  She is not particularly concerned or bothered by it.  She maintains regular follow up with Dr. Redman where Arimidex is ongoing and Dr. Martinez were imaging is performed.  Follow up here in 1 year

## (undated) DEVICE — BLADE SURG #15 CARBON STEEL

## (undated) DEVICE — GOWN POLY REINF BRTH SLV XL

## (undated) DEVICE — MARKER SKIN RULER STERILE

## (undated) DEVICE — APPLIER CLIP LIAGCLIP 9.375IN

## (undated) DEVICE — SOL 9P NACL IRR PIC IL

## (undated) DEVICE — ADHESIVE DERMABOND ADVANCED

## (undated) DEVICE — TOWEL OR DISP STRL BLUE 4/PK

## (undated) DEVICE — PACK ECLIPSE UNIVERSAL STERILE

## (undated) DEVICE — SUT VICRYL 3-0 27 SH

## (undated) DEVICE — SUT VICRYL 4-0 RB1 27IN UD

## (undated) DEVICE — KIT TURNOVER

## (undated) DEVICE — PACK BASIC SETUP SC BR

## (undated) DEVICE — COVER PROBE US 5.5X58L NON LTX

## (undated) DEVICE — COVER LIGHT HANDLE 80/CA

## (undated) DEVICE — SUT MONOCRYL 4.0 PS2 CP496G

## (undated) DEVICE — ELECTRODE BLADE W/SLEEVE 2.75

## (undated) DEVICE — PAD ABDOMINAL STERILE 8X10IN

## (undated) DEVICE — MANIFOLD 4 PORT

## (undated) DEVICE — ELECTRODE REM PLYHSV RETURN 9

## (undated) DEVICE — WAVEGUIDE BRITEFIELD DISP

## (undated) DEVICE — DRAPE THREE-QTR REINF 53X77IN

## (undated) DEVICE — NDL SAFETY 25G X 1.5 ECLIPSE

## (undated) DEVICE — SYR 10CC LUER LOCK

## (undated) DEVICE — GLOVE BIOGEL ECLIPSE SZ 6.5

## (undated) DEVICE — DRAPE UTILITY W/ TAPE 20X30IN

## (undated) DEVICE — SYR 3CC LUER LOC

## (undated) DEVICE — SUT 2/0 30IN SILK BLK BRAI

## (undated) DEVICE — NDL SAFETY 22G X 1.5 ECLIPSE

## (undated) DEVICE — COVER CAMERA OPERATING ROOM

## (undated) DEVICE — HANDSWITCH SUCTION COAG

## (undated) DEVICE — GAUZE SPONGE BULKEE 6X6.75IN

## (undated) DEVICE — GEL AQUASONIC 100 STERILE20GM

## (undated) DEVICE — SPONGE DERMACEA GAUZE 4X4

## (undated) DEVICE — APPLICATOR CHLORAPREP ORN 26ML